# Patient Record
Sex: MALE | Race: BLACK OR AFRICAN AMERICAN | NOT HISPANIC OR LATINO | Employment: FULL TIME | URBAN - METROPOLITAN AREA
[De-identification: names, ages, dates, MRNs, and addresses within clinical notes are randomized per-mention and may not be internally consistent; named-entity substitution may affect disease eponyms.]

---

## 2017-01-13 ENCOUNTER — LAB VISIT (OUTPATIENT)
Dept: LAB | Facility: HOSPITAL | Age: 37
End: 2017-01-13
Attending: INTERNAL MEDICINE
Payer: COMMERCIAL

## 2017-01-13 DIAGNOSIS — E78.5 HYPERLIPIDEMIA: ICD-10-CM

## 2017-01-13 LAB
CHOLEST/HDLC SERPL: 8.5 {RATIO}
HDL/CHOLESTEROL RATIO: 11.7 %
HDLC SERPL-MCNC: 265 MG/DL
HDLC SERPL-MCNC: 31 MG/DL
LDLC SERPL CALC-MCNC: 162.8 MG/DL
NONHDLC SERPL-MCNC: 234 MG/DL
TRIGL SERPL-MCNC: 356 MG/DL

## 2017-01-13 PROCEDURE — 80061 LIPID PANEL: CPT

## 2017-01-13 PROCEDURE — 36415 COLL VENOUS BLD VENIPUNCTURE: CPT | Mod: PO

## 2017-01-16 ENCOUNTER — PATIENT MESSAGE (OUTPATIENT)
Dept: INTERNAL MEDICINE | Facility: CLINIC | Age: 37
End: 2017-01-16

## 2017-01-16 DIAGNOSIS — E78.5 HYPERLIPIDEMIA, UNSPECIFIED HYPERLIPIDEMIA TYPE: Primary | ICD-10-CM

## 2017-01-16 RX ORDER — ATORVASTATIN CALCIUM 20 MG/1
20 TABLET, FILM COATED ORAL DAILY
Qty: 90 TABLET | Refills: 4 | Status: SHIPPED | OUTPATIENT
Start: 2017-01-16 | End: 2017-07-28 | Stop reason: SDUPTHER

## 2017-01-16 NOTE — TELEPHONE ENCOUNTER
Pt. Was informed of his results via phone.  Pt. Verbalized understanding of his new medication and orders per Dr. Hampton.  Patient will start and follow up in 6 months.

## 2017-03-29 ENCOUNTER — OFFICE VISIT (OUTPATIENT)
Dept: INTERNAL MEDICINE | Facility: CLINIC | Age: 37
End: 2017-03-29
Payer: COMMERCIAL

## 2017-03-29 VITALS
WEIGHT: 315 LBS | HEIGHT: 72 IN | HEART RATE: 69 BPM | DIASTOLIC BLOOD PRESSURE: 90 MMHG | BODY MASS INDEX: 42.66 KG/M2 | SYSTOLIC BLOOD PRESSURE: 128 MMHG | TEMPERATURE: 98 F

## 2017-03-29 DIAGNOSIS — G47.9 SLEEP DISTURBANCE: ICD-10-CM

## 2017-03-29 DIAGNOSIS — M54.50 ACUTE LEFT-SIDED LOW BACK PAIN WITHOUT SCIATICA: Primary | ICD-10-CM

## 2017-03-29 PROCEDURE — 3074F SYST BP LT 130 MM HG: CPT | Mod: S$GLB,,, | Performed by: INTERNAL MEDICINE

## 2017-03-29 PROCEDURE — 1160F RVW MEDS BY RX/DR IN RCRD: CPT | Mod: S$GLB,,, | Performed by: INTERNAL MEDICINE

## 2017-03-29 PROCEDURE — 3080F DIAST BP >= 90 MM HG: CPT | Mod: S$GLB,,, | Performed by: INTERNAL MEDICINE

## 2017-03-29 PROCEDURE — 99214 OFFICE O/P EST MOD 30 MIN: CPT | Mod: S$GLB,,, | Performed by: INTERNAL MEDICINE

## 2017-03-29 PROCEDURE — 99999 PR PBB SHADOW E&M-EST. PATIENT-LVL III: CPT | Mod: PBBFAC,,, | Performed by: INTERNAL MEDICINE

## 2017-03-29 RX ORDER — MELOXICAM 7.5 MG/1
7.5 TABLET ORAL DAILY
Qty: 10 TABLET | Refills: 0 | Status: SHIPPED | OUTPATIENT
Start: 2017-03-29 | End: 2017-06-30

## 2017-03-29 RX ORDER — CYCLOBENZAPRINE HCL 5 MG
5 TABLET ORAL 3 TIMES DAILY PRN
Qty: 10 TABLET | Refills: 0 | Status: SHIPPED | OUTPATIENT
Start: 2017-03-29 | End: 2017-04-08

## 2017-03-29 NOTE — PROGRESS NOTES
Subjective:       Patient ID: Juan Pablo Saeed is a 36 y.o. male.    Chief Complaint: Flank Pain    HPI  patient is a 36-year-old male coming in today with complaints of lower back discomfort.  He describes for about a week now has had some discomfort on his right lower back.  He states he does not recall a specific injury but he was not moving a lot of doing a lot of physical work in the timeframe when it started.  He states is mainly on the right lower back but it is starting to her low but on the left lower back.  It is not radiated down into his legs.  He denies any bowel or bladder symptoms associated with it.  It does not radiate into his groin.    He complains today of some issues around sleepiness and fatigue.  He states that over the last few days he's been exceptionally tired but probably overall he's had issues with sleepiness 50 some time.  We discussed his prior history with this and he did have evidence of some sleep apnea hypopnea on a home sleep study done in 2015 and then he had a follow-up and lab study but it doesn't look like he had follow-up on that study.  During his [place] exam today he scored an 11.  He is obese with a BMI of 49.57.  Clearly is likely dealing with a hypopnea and apnea syndrome.  Patient relates awakening gasping for breath and having witnessed apneas as well.    Review of Systems    Objective:   BP (!) 128/90  Pulse 69  Temp 98.1 °F (36.7 °C) (Tympanic)   Ht 6' (1.829 m)  Wt (!) 165.8 kg (365 lb 8.4 oz)  BMI 49.57 kg/m2     Physical Exam   Constitutional: He appears well-developed and well-nourished.   HENT:   Head: Normocephalic and atraumatic.   Eyes: Pupils are equal, round, and reactive to light.   Neck: Neck supple. No thyromegaly present.   Cardiovascular: Normal rate, regular rhythm and normal heart sounds.  Exam reveals no gallop and no friction rub.    No murmur heard.  Pulmonary/Chest: Breath sounds normal. He has no wheezes. He has no rales.   Abdominal:  Soft. Bowel sounds are normal. He exhibits no distension. There is no tenderness.   Musculoskeletal:   No spinous process tenderness.  There is tenderness in the right lower back superior to the iliac crest.  Mild spasm is noted.  Mild tenderness in the left lower back in the same region.  Straight leg raises are normal.  Figure fours are normal.   Vitals reviewed.      No visits with results within 2 Week(s) from this visit.  Latest known visit with results is:    Lab Visit on 01/13/2017   Component Date Value    Cholesterol 01/13/2017 265*    Triglycerides 01/13/2017 356*    HDL 01/13/2017 31*    LDL Cholesterol 01/13/2017 162.8*    HDL/Chol Ratio 01/13/2017 11.7*    Total Cholesterol/HDL Ra* 01/13/2017 8.5*    Non-HDL Cholesterol 01/13/2017 234        Assessment:       1. Acute left-sided low back pain without sciatica    2. Sleep disturbance        Plan:   No problem-specific Assessment & Plan notes found for this encounter.    Juan Pablo was seen today for flank pain.    Diagnoses and all orders for this visit:    Acute left-sided low back pain without sciatica  Comments:  Flexeril 5mg at bedtime.  Meloxicam 7.5mg once daily for 10 days.  Orders:  -     cyclobenzaprine (FLEXERIL) 5 MG tablet; Take 1 tablet (5 mg total) by mouth 3 (three) times daily as needed for Muscle spasms.  -     meloxicam (MOBIC) 7.5 MG tablet; Take 1 tablet (7.5 mg total) by mouth once daily.    Sleep disturbance  -     Ambulatory consult to Sleep Disorders        Return in about 3 months (around 6/29/2017).

## 2017-03-29 NOTE — MR AVS SNAPSHOT
[facility]  [address] [address] [facility] [address]  Phone: [phone]  Fax: [phone]                  Juan Pablo Saeed   3/29/2017 10:00 AM   Office Visit    Description:  Male : 1980   Provider:  Jordin Hampton MD   [facility]           Reason for Visit     Flank Pain           Diagnoses this Visit        Comments    Acute left-sided low back pain without sciatica    -  Primary Flexeril 5mg at bedtime.  Meloxicam 7.5mg once daily for 10 days.    Sleep disturbance                To Do List           Future Appointments        Provider Department Dept Phone    2017 2:40 PM Elizabeth Lejeune, NP [facility] [phone]    2017 10:00 AM Jordin Hampton MD [facility] [phone]    2017 8:10 AM [place]ORY, [place] [facility] [place] [phone]      Goals (5 Years of Data)     None      Follow-Up and Disposition     Return in about 3 months (around 2017).       These Medications        Disp Refills Start End    cyclobenzaprine (FLEXERIL) 5 MG tablet 10 tablet 0 3/29/2017 2017    Take 1 tablet (5 mg total) by mouth 3 (three) times daily as needed for Muscle spasms. - Oral    Pharmacy: [facility] [address] [place] [address] Ph #: [phone]       meloxicam (MOBIC) 7.5 MG tablet 10 tablet 0 3/29/2017     Take 1 tablet (7.5 mg total) by mouth once daily. - Oral    Pharmacy: [facility] [address] [place] [address] [address] [facility] Ph #: [phone]         [facility] On Call     [facility] On Call Nurse Care Line -  Assistance  Registered nurses in the [facility] provide clinical advisement, health education, appointment booking, and other advisory services.  Call for this free service at [phone].             Medications           Message regarding Medications     Verify the changes and/or additions to your medication regime  listed below are the same as discussed with your clinician today.  If any of these changes or additions are incorrect, please notify your healthcare provider.        START taking these NEW medications        Refills    cyclobenzaprine (FLEXERIL) 5 MG tablet 0    Sig: Take 1 tablet (5 mg total) by mouth 3 (three) times daily as needed for Muscle spasms.    Class: Normal    Route: Oral    meloxicam (MOBIC) 7.5 MG tablet 0    Sig: Take 1 tablet (7.5 mg total) by mouth once daily.    Class: Normal    Route: Oral           Verify that the below list of medications is an accurate representation of the medications you are currently taking.  If none reported, the list may be blank. If incorrect, please contact your healthcare provider. Carry this list with you in case of emergency.           Current Medications     atorvastatin (LIPITOR) 20 MG tablet Take 1 tablet (20 mg total) by mouth once daily.    lisinopril (PRINIVIL,ZESTRIL) 5 MG tablet Take 1 tablet (5 mg total) by mouth once daily.    vitamin E 8.9 unit/g Crea Apply to affected area twice daily    cyclobenzaprine (FLEXERIL) 5 MG tablet Take 1 tablet (5 mg total) by mouth 3 (three) times daily as needed for Muscle spasms.    meloxicam (MOBIC) 7.5 MG tablet Take 1 tablet (7.5 mg total) by mouth once daily.           Clinical Reference Information           Your Vitals Were     BP Pulse Temp Height Weight BMI    128/90 69 98.1 °F (36.7 °C) (Tympanic) 6' (1.829 m) 165.8 kg (365 lb 8.4 oz) 49.57 kg/m2      Blood Pressure          Most Recent Value    BP  (!)  128/90      Allergies as of 3/29/2017     No Known Allergies      Immunizations Administered on Date of Encounter - 3/29/2017     None      Orders Placed During Today's Visit      Normal Orders This Visit    Ambulatory consult to Sleep Disorders       Language Assistance Services     ATTENTION: Language assistance services are available, free of charge. Please call [phone].      ATENCIÓN: Bipin palmer  tiene a macario disposición servicios gratuitos de asistencia lingüística. Hillarycorby al [phone].     DUANE Ý: N?u b?n nói Ti?ng Vi?t, có các d?ch v? h? tr? ngôn ng? mi?n phí dành cho b?n. G?i s? [phone].         [facility] complies with applicable Federal civil rights laws and does not discriminate on the basis of race, color, national origin, age, disability, or sex.

## 2017-04-25 ENCOUNTER — INITIAL CONSULT (OUTPATIENT)
Dept: SLEEP MEDICINE | Facility: CLINIC | Age: 37
End: 2017-04-25
Payer: COMMERCIAL

## 2017-04-25 ENCOUNTER — TELEPHONE (OUTPATIENT)
Dept: SLEEP MEDICINE | Facility: CLINIC | Age: 37
End: 2017-04-25

## 2017-04-25 VITALS
WEIGHT: 315 LBS | RESPIRATION RATE: 18 BRPM | BODY MASS INDEX: 42.66 KG/M2 | DIASTOLIC BLOOD PRESSURE: 64 MMHG | SYSTOLIC BLOOD PRESSURE: 130 MMHG | HEIGHT: 72 IN | OXYGEN SATURATION: 97 % | HEART RATE: 86 BPM

## 2017-04-25 DIAGNOSIS — G47.33 OSA (OBSTRUCTIVE SLEEP APNEA): Primary | ICD-10-CM

## 2017-04-25 DIAGNOSIS — E66.01 MORBID OBESITY WITH BMI OF 45.0-49.9, ADULT: ICD-10-CM

## 2017-04-25 PROCEDURE — 99244 OFF/OP CNSLTJ NEW/EST MOD 40: CPT | Mod: S$GLB,,, | Performed by: NURSE PRACTITIONER

## 2017-04-25 PROCEDURE — 99999 PR PBB SHADOW E&M-EST. PATIENT-LVL III: CPT | Mod: PBBFAC,,, | Performed by: NURSE PRACTITIONER

## 2017-04-25 NOTE — PROGRESS NOTES
Subjective:      Patient ID: Juan Pablo Saeed is a 36 y.o. male.    Chief Complaint: sleep eval    HPI Comments: Patient presents to the office today for evaluation of sleep apnea.  Patient with snoring and witnessed apneas. Patient not having problems falling asleep, but wakes up frequently throughout the night.  Patient does not wake up feeling refreshed in the morning.  Patient with daytime hypersomnolence.  [place] Sleepiness Scale score 13.  Patient has had symptoms for years. Comorbidities include      Essential hypertension. Obesity  He has a positive HST in 2015 but did not follow up.  Bedtime midnight. Wake time 4am    STOP - BANG Questionnaire:     1. Snoring : Do you snore loudly ?    Yes    2. Tired : Do you often feel tired, fatigued, or sleepy during daytime? Yes    3. Observed: Has anyone observed you stop breathing during your sleep?   Yes     4. Blood pressure : Do you have or are you being treated for high blood pressure?   Yes    5. BMI :BMI more than 35 kg/m2?   Yes    6. Age : Age over 50 yr old?   No    7. Neck circumference: Neck circumference greater than 40 cm?   Yes    8. Gender: Gender male?   Yes    High risk of MOSHE: Yes 5 - 8  Intermediate risk of MOSHE: Yes 3 - 4  Low risk of MOSHE: Yes 0 - 2      References:   STOP Questionnaire   A Tool to Screen Patients for Obstructive Sleep Apnea: JENAE Arciniega.C.P.C., TJ Gaspar.B.B.S., Filemon Freire M.D.,Laura Aguirre, Ph.D., Nat Poole M.B.B.S.,_ Ana Bonner.,_ Anival Elmore M.D., SURAJ Dover.R.C.P.C.; Anesthesiology 2008; 108:812-21 Copyright © 2008, the American Society of Anesthesiologists, Inc. [employer]          /64  Pulse 86  Resp 18  Ht 6' (1.829 m)  Wt (!) 165.4 kg (364 lb 10.3 oz)  SpO2 97%  BMI 49.45 kg/m2  Body mass index is 49.45 kg/(m^2).    Review of Systems   Constitutional: Negative for fever, chills and activity change.   HENT: Negative for  postnasal drip, rhinorrhea, trouble swallowing and congestion.    Respiratory: Negative for hemoptysis, shortness of breath and wheezing.    Cardiovascular: Negative for chest pain, palpitations and leg swelling.   Genitourinary: Negative for difficulty urinating and hematuria.   Endocrine: Negative for cold intolerance and heat intolerance.    Musculoskeletal: Negative for gait problem, joint swelling and myalgias.   Skin: Negative for rash.   Gastrointestinal: Negative for nausea, vomiting, abdominal pain and abdominal distention.   Neurological: Negative for dizziness.   Hematological: Negative for adenopathy.   Psychiatric/Behavioral: Positive for sleep disturbance. Negative for confusion. The patient is not nervous/anxious.      Objective:      Physical Exam   Constitutional: He is oriented to person, place, and time. He appears well-developed and well-nourished.   Obese   HENT:   Head: Normocephalic and atraumatic.   Mouth/Throat: Oropharynx is clear and moist.   Mallampati Score: III     Eyes: EOM are normal. Pupils are equal, round, and reactive to light.   Neck: Normal range of motion. Neck supple.   Neck circumference:20 inches      Cardiovascular: Normal rate and regular rhythm.  Exam reveals no gallop and no friction rub.    No murmur heard.  Pulmonary/Chest: Effort normal and breath sounds normal.   Abdominal: Soft. Bowel sounds are normal. He exhibits no distension. There is no tenderness.   Musculoskeletal: Normal range of motion.   Neurological: He is alert and oriented to person, place, and time.   Skin: Skin is warm and dry.   Psychiatric: He has a normal mood and affect.         Assessment:       1. MOSHE (obstructive sleep apnea)    2. Morbid obesity with BMI of 45.0-49.9, adult        Outpatient Encounter Prescriptions as of 4/25/2017   Medication Sig Dispense Refill    atorvastatin (LIPITOR) 20 MG tablet Take 1 tablet (20 mg total) by mouth once daily. 90 tablet 4    lisinopril  (PRINIVIL,ZESTRIL) 5 MG tablet Take 1 tablet (5 mg total) by mouth once daily. 30 tablet 11    meloxicam (MOBIC) 7.5 MG tablet Take 1 tablet (7.5 mg total) by mouth once daily. 10 tablet 0    vitamin E 8.9 unit/g Crea Apply to affected area twice daily 112 g 2     No facility-administered encounter medications on file as of 4/25/2017.      Orders Placed This Encounter   Procedures    Polysomnogram (CPAP will be added if patient meets diagnostic criteria.)     Standing Status:   Future     Standing Expiration Date:   4/25/2018     Plan:      Formal polysomnogram for evaluation of sleep apnea. Return to clinic when study is available for review.  Avoid drowsy driving.  Weight loss and exercise to improve overall health.      Thank you Dr. Hampton for this consultation.

## 2017-04-25 NOTE — PATIENT INSTRUCTIONS
Your provider has scheduled you for a sleep study.   You should be receiving a phone call from the sleep lab shortly after your study has been approved by your insurance. Your insurance will decide approval for home sleep test vs. inlab sleep study (formal polysomnogram).  Please make sure you have your current phone numbers in the [facility] system. If you do not hear from anyone in the next 10 business days, please call the sleep lab at [phone] to schedule your sleep study.     -The formal inlab sleep studies are performed at [facility]. If it is noted that you do have sleep apnea on your initial sleep study, you may receive a call back for a second night study with the CPAP before you come back to the office.     -The home sleep test are performed at your home through [facility]. If you have any questions please contact our sleep lab at [phone]. You can also go to [email or web address] for more information about your home sleep test.     The sleep lab will also make a follow up appointment with your provider to review the results of the sleep test. This follow up appointment will be 10-14 days after your sleep study to review the results.     Continuous Positive Air Pressure (CPAP)  Continuous positive air pressure (CPAP) uses gentle air pressure to hold the airway open. CPAP is often the most effective treatment for sleep apnea and severe snoring. It works very well for many people. But keep in mind that it can take several adjustments before the setup is right for you.    How CPAP works  The CPAPmachine  is a small portable pump beside the bed. The pump sends air through a hose, which is held over your nose and mouth by a mask. Mild air pressure is gently pushed through your airway. The air pressure nudges sagging tissues aside. This widens the airway so you can breathe better. CPAP may be combined with other kinds of therapy for sleep apnea.     A mask over the nose gently directs air into the  throat to keep the airway open.   Types of air pressure treatments  There are different types of CPAP. Your doctor or CPAP technician will help you decide which type is best for you:  · Basic CPAP keeps the pressure constant all night long.  · A bilevel device (BiPAP) provides more pressure when you breathe in and less when you breathe out. A BiPAP machine also may be set to provide automatic breaths to maintain breathing if you stop breathing while sleeping.  · An autoCPAP device automatically adjusts pressure throughout the night and in response to changes such as body position, sleep stage, and snoring.  Date Last Reviewed: 8/10/2015  © 6341-4133 [employer]. [address] [place] [address], [place] [address]. All rights reserved. This information is not intended as a substitute for professional medical care. Always follow your healthcare professional's instructions.

## 2017-04-25 NOTE — MR AVS SNAPSHOT
[facility]  [address] [place] [address]  Phone: [phone]                  Juan Pablo Saeed   2017 2:40 PM   Initial consult    Description:  Male : 1980   Provider:  Elizabeth Lejeune, NP   Department:  [facility]           Reason for Visit     sleep eval           Diagnoses this Visit        Comments    MOSHE (obstructive sleep apnea)    -  Primary     Morbid obesity with BMI of 45.0-49.9, adult                To Do List           Future Appointments        Provider Department Dept Phone    2017 10:20 AM Jordin Hampton MD [place] [phone]    2017 8:10 AM LAB[place]ATORY, [place] [facility] [place] [phone]      Goals (5 Years of Data)     None      [facility] On Call     [facility] Nurse Care Line -  Assistance  Unless otherwise directed by your provider, please contact [facility], our nurse care line that is available for  assistance.     Registered nurses in the [facility] provide: appointment scheduling, clinical advisement, health education, and other advisory services.  Call: [phone] (toll free)               Medications           Message regarding Medications     Verify the changes and/or additions to your medication regime listed below are the same as discussed with your clinician today.  If any of these changes or additions are incorrect, please notify your healthcare provider.             Verify that the below list of medications is an accurate representation of the medications you are currently taking.  If none reported, the list may be blank. If incorrect, please contact your healthcare provider. Carry this list with you in case of emergency.           Current Medications     atorvastatin (LIPITOR) 20 MG tablet Take 1 tablet (20 mg total) by mouth once daily.    lisinopril (PRINIVIL,ZESTRIL) 5 MG tablet Take 1 tablet (5 mg total) by mouth once daily.     meloxicam (MOBIC) 7.5 MG tablet Take 1 tablet (7.5 mg total) by mouth once daily.    vitamin E 8.9 unit/g Crea Apply to affected area twice daily           Clinical Reference Information           Your Vitals Were     BP Pulse Resp Height Weight SpO2    130/64 86 18 6' (1.829 m) 165.4 kg (364 lb 10.3 oz) 97%    BMI                49.45 kg/m2          Blood Pressure          Most Recent Value    BP  130/64      Allergies as of 4/25/2017     No Known Allergies      Immunizations Administered on Date of Encounter - 4/25/2017     None      Orders Placed During Today's Visit     Future Labs/Procedures Expected by Expires    Polysomnogram (CPAP will be added if patient meets diagnostic criteria.)  As directed 4/25/2018      Instructions    Your provider has scheduled you for a sleep study.   You should be receiving a phone call from the sleep lab shortly after your study has been approved by your insurance. Your insurance will decide approval for home sleep test vs. inlab sleep study (formal polysomnogram).  Please make sure you have your current phone numbers in the [facility] system. If you do not hear from anyone in the next 10 business days, please call the sleep lab at [phone] to schedule your sleep study.     -The formal inlab sleep studies are performed at [facility]. If it is noted that you do have sleep apnea on your initial sleep study, you may receive a call back for a second night study with the CPAP before you come back to the office.     -The home sleep test are performed at your home through [facility]. If you have any questions please contact our sleep lab at [phone]. You can also go to [email or web address] for more information about your home sleep test.     The sleep lab will also make a follow up appointment with your provider to review the results of the sleep test. This follow up appointment will be 10-14 days after your sleep study to review the results.     Continuous Positive Air  Pressure (CPAP)  Continuous positive air pressure (CPAP) uses gentle air pressure to hold the airway open. CPAP is often the most effective treatment for sleep apnea and severe snoring. It works very well for many people. But keep in mind that it can take several adjustments before the setup is right for you.    How CPAP works  The CPAPmachine  is a small portable pump beside the bed. The pump sends air through a hose, which is held over your nose and mouth by a mask. Mild air pressure is gently pushed through your airway. The air pressure nudges sagging tissues aside. This widens the airway so you can breathe better. CPAP may be combined with other kinds of therapy for sleep apnea.     A mask over the nose gently directs air into the throat to keep the airway open.   Types of air pressure treatments  There are different types of CPAP. Your doctor or CPAP technician will help you decide which type is best for you:  · Basic CPAP keeps the pressure constant all night long.  · A bilevel device (BiPAP) provides more pressure when you breathe in and less when you breathe out. A BiPAP machine also may be set to provide automatic breaths to maintain breathing if you stop breathing while sleeping.  · An autoCPAP device automatically adjusts pressure throughout the night and in response to changes such as body position, sleep stage, and snoring.  Date Last Reviewed: 8/10/2015  © 6607-0655 [employer]. [address] [place] [address]. All rights reserved. This information is not intended as a substitute for professional medical care. Always follow your healthcare professional's instructions.             Language Assistance Services     ATTENTION: Language assistance services are available, free of charge. Please call [phone].      ATENCIÓN: Si habla español, tiene a macario disposición servicios gratuitos de asistencia lingüística. Llame al [phone].     DUANE Ý: N?u b?n nói Ti?ng Vi?t, có các  d?ch v? h? tr? ngôn ng? mi?n phí yandyh cho b?n. G?i s? [phone].         [facility] complies with applicable Federal civil rights laws and does not discriminate on the basis of race, color, national origin, age, disability, or sex.

## 2017-04-25 NOTE — TELEPHONE ENCOUNTER
----- Message from Nemo Johnson sent at 4/25/2017 12:49 PM CDT -----  needs callback rg today's appt, will elaborate..[phone] (home)

## 2017-04-25 NOTE — LETTER
April 25, 2017      Jordin Hampton MD  [address]  [address]  [place] [address]           [facility]  [address]  [place] [address]  Phone: [phone]          Patient: Juan Pablo Saeed   MR Number: [id number]   YOB: 1980   Date of Visit: 4/25/2017       Dear Dr. Jordin Hampton:    Thank you for referring Juan Pablo Saeed to me for evaluation. Attached you will find relevant portions of my assessment and plan of care.    If you have questions, please do not hesitate to call me. I look forward to following Juan Pablo Saeed along with you.    Sincerely,    Elizabeth Lejeune, NP    Enclosure  CC:  No Recipients    If you would like to receive this communication electronically, please contact [email or web address] or [phone] to request more information on [employer] Link access.    For providers and/or their staff who would like to refer a patient to [facility], please contact us through our one-stop-shop provider referral line, [facility] , at [phone].    If you feel you have received this communication in error or would no longer like to receive these types of communications, please e-mail [email or web address]

## 2017-04-25 NOTE — TELEPHONE ENCOUNTER
Pt thought this was the appt to have the sleep study done. Informed him this is the evaluation to get the sleep study. Pt verbalized understanding

## 2017-05-02 ENCOUNTER — HOSPITAL ENCOUNTER (OUTPATIENT)
Dept: SLEEP MEDICINE | Facility: HOSPITAL | Age: 37
Discharge: HOME | End: 2017-05-02
Attending: NURSE PRACTITIONER
Payer: COMMERCIAL

## 2017-05-02 DIAGNOSIS — G47.33 OSA (OBSTRUCTIVE SLEEP APNEA): Primary | ICD-10-CM

## 2017-05-02 DIAGNOSIS — G47.63 SLEEP-RELATED BRUXISM: ICD-10-CM

## 2017-05-02 DIAGNOSIS — E66.01 MORBID OBESITY WITH BMI OF 45.0-49.9, ADULT: ICD-10-CM

## 2017-05-02 DIAGNOSIS — Z72.821 INADEQUATE SLEEP HYGIENE: ICD-10-CM

## 2017-05-02 DIAGNOSIS — G47.26 CIRCADIAN RHYTHM SLEEP DISORDER, SHIFT WORK TYPE: ICD-10-CM

## 2017-05-02 DIAGNOSIS — F51.04 PSYCHOPHYSIOLOGICAL INSOMNIA: ICD-10-CM

## 2017-05-02 DIAGNOSIS — F51.12 BEHAVIORALLY INDUCED INSUFFICIENT SLEEP SYNDROME: ICD-10-CM

## 2017-05-02 PROCEDURE — 95810 POLYSOM 6/> YRS 4/> PARAM: CPT

## 2017-05-02 PROCEDURE — 95810 POLYSOM 6/> YRS 4/> PARAM: CPT | Mod: 26,,, | Performed by: PSYCHOLOGIST

## 2017-05-08 ENCOUNTER — TELEPHONE (OUTPATIENT)
Dept: PULMONOLOGY | Facility: CLINIC | Age: 37
End: 2017-05-08

## 2017-05-08 DIAGNOSIS — G47.33 OSA (OBSTRUCTIVE SLEEP APNEA): Primary | ICD-10-CM

## 2017-05-12 ENCOUNTER — OFFICE VISIT (OUTPATIENT)
Dept: INTERNAL MEDICINE | Facility: CLINIC | Age: 37
End: 2017-05-12
Payer: COMMERCIAL

## 2017-05-12 VITALS
HEIGHT: 73 IN | HEART RATE: 72 BPM | BODY MASS INDEX: 41.75 KG/M2 | DIASTOLIC BLOOD PRESSURE: 80 MMHG | WEIGHT: 315 LBS | TEMPERATURE: 98 F | SYSTOLIC BLOOD PRESSURE: 118 MMHG

## 2017-05-12 DIAGNOSIS — M67.479 GANGLION CYST OF FOOT: ICD-10-CM

## 2017-05-12 DIAGNOSIS — I10 ESSENTIAL HYPERTENSION: ICD-10-CM

## 2017-05-12 DIAGNOSIS — M77.12 LEFT TENNIS ELBOW: Primary | ICD-10-CM

## 2017-05-12 DIAGNOSIS — G47.33 OSA (OBSTRUCTIVE SLEEP APNEA): ICD-10-CM

## 2017-05-12 PROCEDURE — 99213 OFFICE O/P EST LOW 20 MIN: CPT | Mod: S$GLB,,, | Performed by: PHYSICIAN ASSISTANT

## 2017-05-12 PROCEDURE — 3074F SYST BP LT 130 MM HG: CPT | Mod: S$GLB,,, | Performed by: PHYSICIAN ASSISTANT

## 2017-05-12 PROCEDURE — 3079F DIAST BP 80-89 MM HG: CPT | Mod: S$GLB,,, | Performed by: PHYSICIAN ASSISTANT

## 2017-05-12 PROCEDURE — 99999 PR PBB SHADOW E&M-EST. PATIENT-LVL III: CPT | Mod: PBBFAC,,, | Performed by: PHYSICIAN ASSISTANT

## 2017-05-12 PROCEDURE — 1160F RVW MEDS BY RX/DR IN RCRD: CPT | Mod: S$GLB,,, | Performed by: PHYSICIAN ASSISTANT

## 2017-05-12 NOTE — PATIENT INSTRUCTIONS
Understanding Lateral Epicondylitis    Tendons are strong bands of tissue that connect muscles to bones. Lateral epicondylitis affects the tendons that connect muscles in the forearm to the lateral epicondyle. This is the bony knob on the outer side of the elbow. The condition occurs if the extensor tendons of the wrist become red and swollen (irritated). This can cause pain in the elbow, forearm, and wrist. Because the condition is sometimes caused by playing tennis, it is also known as tennis elbow.  How to say it  LA-tuhr-juan r [id number]   What causes lateral epicondylitis?  The condition most often occurs because of overuse. This can be from any activity that repeatedly puts stress on the forearm extensor muscles or tendons and wrist. For instance, playing tennis, lifting weights, cutting meat, painting, and typing can all cause the condition. Wear and tear of the tendons from aging or an injury to the tendons can also cause the condition.  Symptoms of lateral epicondylitis  The most common symptom is pain. You may feel it on the outer side of the elbow and down the back of the forearm. It may be worse when moving or using the elbow, forearm, or wrist. You may also feel pain when gripping or lifting things.  Treatment for lateral epicondylitis  Treatments may include:  · Resting the elbow, forearm, and wrist. Youll need to avoid movements that can make your symptoms worse. You also may need to avoid certain sports and types of work for a time. This helps relieve symptoms and prevent further damage to the tendons.  · Changing the action that caused the problem. For instance, if the tendons were damaged from playing tennis, it may help to change your playing technique or use different equipment. This helps prevent further damage to the tendons.  · Using cold packs. Putting an ice pack on the injured area can help reduce pain and swelling.  · Taking pain medicines. Taking prescription or  over-the-counter pain medicines may help reduce pain and swelling.    · Wearing a brace. This helps reduce strain on the muscles and tendons in the forearm, which may relieve symptoms. It is very important to wear the brace properly.  · Doing exercises and physical therapy. These help improve strength and range of motion in the elbow, forearm, and wrist.  · Getting shots of medicine into the injured area. These may help relieve symptoms for a time.  · Having surgery. This may be an option if other treatments fail to relieve symptoms. In many cases, the surgeon removes the damaged tissue.  Possible complications of lateral epicondylitis  If the tendons involved dont heal properly, symptoms may return or get worse. To help prevent this, follow your treatment plan as directed.  When to call your healthcare provider  Call your healthcare provider right away if you have any of these:  · Fever of 100.4°F (38°C) or higher, or as directed  · Redness, swelling, or warmth in the elbow or forearm that gets worse  · Symptoms that dont get better with treatment, or get worse  · New symptoms   Date Last Reviewed: 3/10/2016  © 1975-4165 [employer]. [address] [place] [address]. All rights reserved. This information is not intended as a substitute for professional medical care. Always follow your healthcare professional's instructions.        Understanding Lateral Epicondylitis    Tendons are strong bands of tissue that connect muscles to bones. Lateral epicondylitis affects the tendons that connect muscles in the forearm to the lateral epicondyle. This is the bony knob on the outer side of the elbow. The condition occurs if the extensor tendons of the wrist become red and swollen (irritated). This can cause pain in the elbow, forearm, and wrist. Because the condition is sometimes caused by playing tennis, it is also known as tennis elbow.  How to say it  LA-tuhr-juan r [id number]   What  causes lateral epicondylitis?  The condition most often occurs because of overuse. This can be from any activity that repeatedly puts stress on the forearm extensor muscles or tendons and wrist. For instance, playing tennis, lifting weights, cutting meat, painting, and typing can all cause the condition. Wear and tear of the tendons from aging or an injury to the tendons can also cause the condition.  Symptoms of lateral epicondylitis  The most common symptom is pain. You may feel it on the outer side of the elbow and down the back of the forearm. It may be worse when moving or using the elbow, forearm, or wrist. You may also feel pain when gripping or lifting things.  Treatment for lateral epicondylitis  Treatments may include:  · Resting the elbow, forearm, and wrist. Youll need to avoid movements that can make your symptoms worse. You also may need to avoid certain sports and types of work for a time. This helps relieve symptoms and prevent further damage to the tendons.  · Changing the action that caused the problem. For instance, if the tendons were damaged from playing tennis, it may help to change your playing technique or use different equipment. This helps prevent further damage to the tendons.  · Using cold packs. Putting an ice pack on the injured area can help reduce pain and swelling.  · Taking pain medicines. Taking prescription or over-the-counter pain medicines may help reduce pain and swelling.    · Wearing a brace. This helps reduce strain on the muscles and tendons in the forearm, which may relieve symptoms. It is very important to wear the brace properly.  · Doing exercises and physical therapy. These help improve strength and range of motion in the elbow, forearm, and wrist.  · Getting shots of medicine into the injured area. These may help relieve symptoms for a time.  · Having surgery. This may be an option if other treatments fail to relieve symptoms. In many cases, the surgeon removes the  damaged tissue.  Possible complications of lateral epicondylitis  If the tendons involved dont heal properly, symptoms may return or get worse. To help prevent this, follow your treatment plan as directed.  When to call your healthcare provider  Call your healthcare provider right away if you have any of these:  · Fever of 100.4°F (38°C) or higher, or as directed  · Redness, swelling, or warmth in the elbow or forearm that gets worse  · Symptoms that dont get better with treatment, or get worse  · New symptoms   Date Last Reviewed: 3/10/2016  © 3706-2701 [employer]. [address] [place] [address]. All rights reserved. This information is not intended as a substitute for professional medical care. Always follow your healthcare professional's instructions.        Understanding Lateral Epicondylitis    Tendons are strong bands of tissue that connect muscles to bones. Lateral epicondylitis affects the tendons that connect muscles in the forearm to the lateral epicondyle. This is the bony knob on the outer side of the elbow. The condition occurs if the extensor tendons of the wrist become red and swollen (irritated). This can cause pain in the elbow, forearm, and wrist. Because the condition is sometimes caused by playing tennis, it is also known as tennis elbow.  How to say it  LA-tuhr-juan r [id number]   What causes lateral epicondylitis?  The condition most often occurs because of overuse. This can be from any activity that repeatedly puts stress on the forearm extensor muscles or tendons and wrist. For instance, playing tennis, lifting weights, cutting meat, painting, and typing can all cause the condition. Wear and tear of the tendons from aging or an injury to the tendons can also cause the condition.  Symptoms of lateral epicondylitis  The most common symptom is pain. You may feel it on the outer side of the elbow and down the back of the forearm. It may be worse when moving or  using the elbow, forearm, or wrist. You may also feel pain when gripping or lifting things.  Treatment for lateral epicondylitis  Treatments may include:  · Resting the elbow, forearm, and wrist. Youll need to avoid movements that can make your symptoms worse. You also may need to avoid certain sports and types of work for a time. This helps relieve symptoms and prevent further damage to the tendons.  · Changing the action that caused the problem. For instance, if the tendons were damaged from playing tennis, it may help to change your playing technique or use different equipment. This helps prevent further damage to the tendons.  · Using cold packs. Putting an ice pack on the injured area can help reduce pain and swelling.  · Taking pain medicines. Taking prescription or over-the-counter pain medicines may help reduce pain and swelling.    · Wearing a brace. This helps reduce strain on the muscles and tendons in the forearm, which may relieve symptoms. It is very important to wear the brace properly.  · Doing exercises and physical therapy. These help improve strength and range of motion in the elbow, forearm, and wrist.  · Getting shots of medicine into the injured area. These may help relieve symptoms for a time.  · Having surgery. This may be an option if other treatments fail to relieve symptoms. In many cases, the surgeon removes the damaged tissue.  Possible complications of lateral epicondylitis  If the tendons involved dont heal properly, symptoms may return or get worse. To help prevent this, follow your treatment plan as directed.  When to call your healthcare provider  Call your healthcare provider right away if you have any of these:  · Fever of 100.4°F (38°C) or higher, or as directed  · Redness, swelling, or warmth in the elbow or forearm that gets worse  · Symptoms that dont get better with treatment, or get worse  · New symptoms   Date Last Reviewed: 3/10/2016  © 0759-4285 [employer] [address] [place] [id number]. All rights reserved. This information is not intended as a substitute for professional medical care. Always follow your healthcare professional's instructions.

## 2017-05-12 NOTE — PROGRESS NOTES
"Subjective:       Patient ID: Juan Pablo Saeed is a 36 y.o. male.    Chief Complaint: Elbow Pain and bump on left foot    HPI  Patient comes in today for left elbow pain - pain present when he lifts up on things.  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  This has been present for a few weeks. No swelling or injury   Pain absent when resting.   He also has bump on left foot that popped up randomly, no injury. It is not painful or bothersome  No swelling       Past Medical History:   Diagnosis Date    Back pain     GERD (gastroesophageal reflux disease)     HTN (hypertension)     Obesity        Current Outpatient Prescriptions   Medication Sig Dispense Refill    atorvastatin (LIPITOR) 20 MG tablet Take 1 tablet (20 mg total) by mouth once daily. 90 tablet 4    lisinopril (PRINIVIL,ZESTRIL) 5 MG tablet Take 1 tablet (5 mg total) by mouth once daily. 30 tablet 11    meloxicam (MOBIC) 7.5 MG tablet Take 1 tablet (7.5 mg total) by mouth once daily. 10 tablet 0    vitamin E 8.9 unit/g Crea Apply to affected area twice daily 112 g 2     No current facility-administered medications for this visit.        Review of Systems    Objective:   /80  Pulse 72  Temp 97.8 °F (36.6 °C) (Tympanic)   Ht 6' 0.75" (1.848 m)  Wt (!) 166 kg (365 lb 15.4 oz)  BMI 48.62 kg/m2     Physical Exam   Constitutional: He is oriented to person, place, and time. He appears well-developed and well-nourished. No distress.   HENT:   Head: Normocephalic and atraumatic.   Eyes: Conjunctivae and EOM are normal. Pupils are equal, round, and reactive to light.   Cardiovascular: Normal rate, regular rhythm and normal heart sounds.    Musculoskeletal:        Left elbow: Tenderness found. Lateral epicondyle tenderness noted.        Arms:  Neurological: He is alert and oriented to person, place, and time.       Soft, round 1cm superficial ganglion left midfoot   Lab Results   Component Value Date    WBC 6.23 06/29/2016 "    HGB 13.5 (L) 06/29/2016    HCT 40.8 06/29/2016     06/29/2016    CHOL 265 (H) 01/13/2017    TRIG 356 (H) 01/13/2017    HDL 31 (L) 01/13/2017    ALT 29 06/29/2016    AST 27 06/29/2016     08/22/2016    K 4.5 08/22/2016     08/22/2016    CREATININE 1.8 (H) 08/22/2016    BUN 17 08/22/2016    CO2 24 08/22/2016    TSH 1.940 02/17/2015    HGBA1C 5.7 08/22/2016       Assessment:       1. Left tennis elbow    2. Essential hypertension    3. MOSHE (obstructive sleep apnea)    4. Ganglion cyst of foot        Plan:   Left tennis elbow- only present with activity.  Discussed wearing a brace, activity modification and then if needed, start NSAID    Essential hypertension-  Complicated by MOSHE, obesity and sleeping habits     MOSHE (obstructive sleep apnea)-  Has follow up with pulmonary soon, discussed need for weight loss.     Ganglion cyst of foot- observation

## 2017-05-12 NOTE — MR AVS SNAPSHOT
[facility]  [address] [place] [address]  Phone: [phone]  Fax: [phone]                  Juan Pablo Saeed   2017 1:00 PM   Office Visit    Description:  Male : 1980   Provider:  SHELLI Clancy   [facility]           Reason for Visit     Elbow Pain     bump on left foot           Diagnoses this Visit        Comments    Left tennis elbow    -  Primary            To Do List           Future Appointments        Provider Department Dept Phone    2017 1:20 PM Tal Mcclure MD [facility] Nephrology [phone]    2017 9:00 AM Elizabeth Lejeune, NP [facility] Sleep Clinic [phone]    2017 10:20 AM Jordin Hampton [facility] [phone]    2017 8:10 AM LAB[facility]ATORY, [place] [facility] [phone]      Goals (5 Years of Data)     None      [facility]     [facility] Nurse Care Line -  Assistance  Unless otherwise directed by your provider, please contact [facility], our nurse care line that is available for  assistance.     Registered nurses in the [facility] provide: appointment scheduling, clinical advisement, health education, and other advisory services.  Call: [phone] (toll free)               Medications           Message regarding Medications     Verify the changes and/or additions to your medication regime listed below are the same as discussed with your clinician today.  If any of these changes or additions are incorrect, please notify your healthcare provider.             Verify that the below list of medications is an accurate representation of the medications you are currently taking.  If none reported, the list may be blank. If incorrect, please contact your healthcare provider. Carry this list with you in case of emergency.           Current Medications     atorvastatin (LIPITOR) 20 MG tablet Take 1  "tablet (20 mg total) by mouth once daily.    lisinopril (PRINIVIL,ZESTRIL) 5 MG tablet Take 1 tablet (5 mg total) by mouth once daily.    meloxicam (MOBIC) 7.5 MG tablet Take 1 tablet (7.5 mg total) by mouth once daily.    vitamin E 8.9 unit/g Crea Apply to affected area twice daily           Clinical Reference Information           Your Vitals Were     BP Pulse Temp Height Weight BMI    118/80 72 97.8 °F (36.6 °C) (Tympanic) 6' 0.75" (1.848 m) 166 kg (365 lb 15.4 oz) 48.62 kg/m2      Blood Pressure          Most Recent Value    BP  118/80      Allergies as of 5/12/2017     No Known Allergies      Immunizations Administered on Date of Encounter - 5/12/2017     None      Instructions      Understanding Lateral Epicondylitis    Tendons are strong bands of tissue that connect muscles to bones. Lateral epicondylitis affects the tendons that connect muscles in the forearm to the lateral epicondyle. This is the bony knob on the outer side of the elbow. The condition occurs if the extensor tendons of the wrist become red and swollen (irritated). This can cause pain in the elbow, forearm, and wrist. Because the condition is sometimes caused by playing tennis, it is also known as tennis elbow.  How to say it  LA-tuhr-juan r [id number]   What causes lateral epicondylitis?  The condition most often occurs because of overuse. This can be from any activity that repeatedly puts stress on the forearm extensor muscles or tendons and wrist. For instance, playing tennis, lifting weights, cutting meat, painting, and typing can all cause the condition. Wear and tear of the tendons from aging or an injury to the tendons can also cause the condition.  Symptoms of lateral epicondylitis  The most common symptom is pain. You may feel it on the outer side of the elbow and down the back of the forearm. It may be worse when moving or using the elbow, forearm, or wrist. You may also feel pain when gripping or lifting things.  Treatment " for lateral epicondylitis  Treatments may include:  · Resting the elbow, forearm, and wrist. Youll need to avoid movements that can make your symptoms worse. You also may need to avoid certain sports and types of work for a time. This helps relieve symptoms and prevent further damage to the tendons.  · Changing the action that caused the problem. For instance, if the tendons were damaged from playing tennis, it may help to change your playing technique or use different equipment. This helps prevent further damage to the tendons.  · Using cold packs. Putting an ice pack on the injured area can help reduce pain and swelling.  · Taking pain medicines. Taking prescription or over-the-counter pain medicines may help reduce pain and swelling.    · Wearing a brace. This helps reduce strain on the muscles and tendons in the forearm, which may relieve symptoms. It is very important to wear the brace properly.  · Doing exercises and physical therapy. These help improve strength and range of motion in the elbow, forearm, and wrist.  · Getting shots of medicine into the injured area. These may help relieve symptoms for a time.  · Having surgery. This may be an option if other treatments fail to relieve symptoms. In many cases, the surgeon removes the damaged tissue.  Possible complications of lateral epicondylitis  If the tendons involved dont heal properly, symptoms may return or get worse. To help prevent this, follow your treatment plan as directed.  When to call your healthcare provider  Call your healthcare provider right away if you have any of these:  · Fever of 100.4°F (38°C) or higher, or as directed  · Redness, swelling, or warmth in the elbow or forearm that gets worse  · Symptoms that dont get better with treatment, or get worse  · New symptoms   Date Last Reviewed: 3/10/2016  © 2275-8462 [employer]. [address] [place] [address], [place] [address]. All rights reserved. This information is not intended as  a substitute for professional medical care. Always follow your healthcare professional's instructions.        Understanding Lateral Epicondylitis    Tendons are strong bands of tissue that connect muscles to bones. Lateral epicondylitis affects the tendons that connect muscles in the forearm to the lateral epicondyle. This is the bony knob on the outer side of the elbow. The condition occurs if the extensor tendons of the wrist become red and swollen (irritated). This can cause pain in the elbow, forearm, and wrist. Because the condition is sometimes caused by playing tennis, it is also known as tennis elbow.  How to say it  LA-tuhr-juan r [id number]   What causes lateral epicondylitis?  The condition most often occurs because of overuse. This can be from any activity that repeatedly puts stress on the forearm extensor muscles or tendons and wrist. For instance, playing tennis, lifting weights, cutting meat, painting, and typing can all cause the condition. Wear and tear of the tendons from aging or an injury to the tendons can also cause the condition.  Symptoms of lateral epicondylitis  The most common symptom is pain. You may feel it on the outer side of the elbow and down the back of the forearm. It may be worse when moving or using the elbow, forearm, or wrist. You may also feel pain when gripping or lifting things.  Treatment for lateral epicondylitis  Treatments may include:  · Resting the elbow, forearm, and wrist. Youll need to avoid movements that can make your symptoms worse. You also may need to avoid certain sports and types of work for a time. This helps relieve symptoms and prevent further damage to the tendons.  · Changing the action that caused the problem. For instance, if the tendons were damaged from playing tennis, it may help to change your playing technique or use different equipment. This helps prevent further damage to the tendons.  · Using cold packs. Putting an ice pack on the  injured area can help reduce pain and swelling.  · Taking pain medicines. Taking prescription or over-the-counter pain medicines may help reduce pain and swelling.    · Wearing a brace. This helps reduce strain on the muscles and tendons in the forearm, which may relieve symptoms. It is very important to wear the brace properly.  · Doing exercises and physical therapy. These help improve strength and range of motion in the elbow, forearm, and wrist.  · Getting shots of medicine into the injured area. These may help relieve symptoms for a time.  · Having surgery. This may be an option if other treatments fail to relieve symptoms. In many cases, the surgeon removes the damaged tissue.  Possible complications of lateral epicondylitis  If the tendons involved dont heal properly, symptoms may return or get worse. To help prevent this, follow your treatment plan as directed.  When to call your healthcare provider  Call your healthcare provider right away if you have any of these:  · Fever of 100.4°F (38°C) or higher, or as directed  · Redness, swelling, or warmth in the elbow or forearm that gets worse  · Symptoms that dont get better with treatment, or get worse  · New symptoms   Date Last Reviewed: 3/10/2016  © 6093-7489 [employer]. [address] [place] [address]. All rights reserved. This information is not intended as a substitute for professional medical care. Always follow your healthcare professional's instructions.        Understanding Lateral Epicondylitis    Tendons are strong bands of tissue that connect muscles to bones. Lateral epicondylitis affects the tendons that connect muscles in the forearm to the lateral epicondyle. This is the bony knob on the outer side of the elbow. The condition occurs if the extensor tendons of the wrist become red and swollen (irritated). This can cause pain in the elbow, forearm, and wrist. Because the condition is sometimes caused by playing tennis,  it is also known as tennis elbow.  How to say it  LA-tuhr-juan r [id number]   What causes lateral epicondylitis?  The condition most often occurs because of overuse. This can be from any activity that repeatedly puts stress on the forearm extensor muscles or tendons and wrist. For instance, playing tennis, lifting weights, cutting meat, painting, and typing can all cause the condition. Wear and tear of the tendons from aging or an injury to the tendons can also cause the condition.  Symptoms of lateral epicondylitis  The most common symptom is pain. You may feel it on the outer side of the elbow and down the back of the forearm. It may be worse when moving or using the elbow, forearm, or wrist. You may also feel pain when gripping or lifting things.  Treatment for lateral epicondylitis  Treatments may include:  · Resting the elbow, forearm, and wrist. Youll need to avoid movements that can make your symptoms worse. You also may need to avoid certain sports and types of work for a time. This helps relieve symptoms and prevent further damage to the tendons.  · Changing the action that caused the problem. For instance, if the tendons were damaged from playing tennis, it may help to change your playing technique or use different equipment. This helps prevent further damage to the tendons.  · Using cold packs. Putting an ice pack on the injured area can help reduce pain and swelling.  · Taking pain medicines. Taking prescription or over-the-counter pain medicines may help reduce pain and swelling.    · Wearing a brace. This helps reduce strain on the muscles and tendons in the forearm, which may relieve symptoms. It is very important to wear the brace properly.  · Doing exercises and physical therapy. These help improve strength and range of motion in the elbow, forearm, and wrist.  · Getting shots of medicine into the injured area. These may help relieve symptoms for a time.  · Having surgery. This may be an  option if other treatments fail to relieve symptoms. In many cases, the surgeon removes the damaged tissue.  Possible complications of lateral epicondylitis  If the tendons involved dont heal properly, symptoms may return or get worse. To help prevent this, follow your treatment plan as directed.  When to call your healthcare provider  Call your healthcare provider right away if you have any of these:  · Fever of 100.4°F (38°C) or higher, or as directed  · Redness, swelling, or warmth in the elbow or forearm that gets worse  · Symptoms that dont get better with treatment, or get worse  · New symptoms   Date Last Reviewed: 3/10/2016  © 0109-6960 [employer]. [address] [place] [address], [place] [address]. All rights reserved. This information is not intended as a substitute for professional medical care. Always follow your healthcare professional's instructions.             Language Assistance Services     ATTENTION: Language assistance services are available, free of charge. Please call [phone].      ATENCIÓN: Si habla estela, tiene a macario disposición servicios gratuitos de asistencia lingüística. Llame al [phone].     [facility] Ý: N?u b?n nói Ti?ng Vi?t, có các d?ch v? h? tr? ngôn ng? mi?n phí dành cho b?n. G?i s? [phone].         [facility] complies with applicable Federal civil rights laws and does not discriminate on the basis of race, color, national origin, age, disability, or sex.

## 2017-05-16 ENCOUNTER — TELEPHONE (OUTPATIENT)
Dept: PULMONOLOGY | Facility: CLINIC | Age: 37
End: 2017-05-16

## 2017-05-16 ENCOUNTER — OFFICE VISIT (OUTPATIENT)
Dept: NEPHROLOGY | Facility: CLINIC | Age: 37
End: 2017-05-16
Payer: COMMERCIAL

## 2017-05-16 ENCOUNTER — LAB VISIT (OUTPATIENT)
Dept: LAB | Facility: HOSPITAL | Age: 37
End: 2017-05-16
Attending: INTERNAL MEDICINE
Payer: COMMERCIAL

## 2017-05-16 VITALS
DIASTOLIC BLOOD PRESSURE: 78 MMHG | SYSTOLIC BLOOD PRESSURE: 118 MMHG | WEIGHT: 315 LBS | BODY MASS INDEX: 42.66 KG/M2 | HEIGHT: 72 IN | HEART RATE: 72 BPM

## 2017-05-16 DIAGNOSIS — R80.9 PROTEINURIA, UNSPECIFIED TYPE: ICD-10-CM

## 2017-05-16 DIAGNOSIS — N18.30 CKD (CHRONIC KIDNEY DISEASE) STAGE 3, GFR 30-59 ML/MIN: Primary | ICD-10-CM

## 2017-05-16 LAB
CREAT UR-MCNC: 119 MG/DL
PROT UR-MCNC: 21 MG/DL
PROT/CREAT RATIO, UR: 0.18

## 2017-05-16 PROCEDURE — 3074F SYST BP LT 130 MM HG: CPT | Mod: S$GLB,,, | Performed by: INTERNAL MEDICINE

## 2017-05-16 PROCEDURE — 3078F DIAST BP <80 MM HG: CPT | Mod: S$GLB,,, | Performed by: INTERNAL MEDICINE

## 2017-05-16 PROCEDURE — 1160F RVW MEDS BY RX/DR IN RCRD: CPT | Mod: S$GLB,,, | Performed by: INTERNAL MEDICINE

## 2017-05-16 PROCEDURE — 99214 OFFICE O/P EST MOD 30 MIN: CPT | Mod: S$GLB,,, | Performed by: INTERNAL MEDICINE

## 2017-05-16 PROCEDURE — 82570 ASSAY OF URINE CREATININE: CPT

## 2017-05-16 PROCEDURE — 99999 PR PBB SHADOW E&M-EST. PATIENT-LVL III: CPT | Mod: PBBFAC,,, | Performed by: INTERNAL MEDICINE

## 2017-05-16 NOTE — PATIENT INSTRUCTIONS
Patient was advised to avoid NSAID pain medications such as advil, aleve, motrin, ibuprofen, naprosyn, meloxicam, diclofenac, mobic.     Patient was advised to hydrate with 60-70 ounces of water per day.

## 2017-05-16 NOTE — PROGRESS NOTES
PROGRESS NOTE FOR ESTABLISHED PATIENT    PHYSICIAN REQUESTING THE CONSULT: Dr. Jordin Hampton    REASON FOR VISIT: Renal insufficiency/proteinuria    36 y.o. male with history of GERD, HTN, PUD presents to the renal clinic for evaluation of renal insufficiency and proteinuria.    Patient denies any complaints today. He denies using any NSAID use and reports good fluid intake (about 2 liters of H2O per day).         Past Medical History:   Diagnosis Date    Back pain     GERD (gastroesophageal reflux disease)     HTN (hypertension)     Obesity        Past Surgical History:   Procedure Laterality Date    CIRCUMCISION  2010       Review of patient's allergies indicates:  No Known Allergies    Current Outpatient Prescriptions   Medication Sig Dispense Refill    atorvastatin (LIPITOR) 20 MG tablet Take 1 tablet (20 mg total) by mouth once daily. 90 tablet 4    lisinopril (PRINIVIL,ZESTRIL) 5 MG tablet Take 1 tablet (5 mg total) by mouth once daily. 30 tablet 11    meloxicam (MOBIC) 7.5 MG tablet Take 1 tablet (7.5 mg total) by mouth once daily. 10 tablet 0    vitamin E 8.9 unit/g Crea Apply to affected area twice daily 112 g 2     No current facility-administered medications for this visit.        Family History   Problem Relation Age of Onset    Arthritis Mother     Cancer Mother      stomach    Diabetes Brother     Alcohol abuse Brother     Stroke Maternal Grandmother        Social History     Social History    Marital status:      Spouse name: N/A    Number of children: 0    Years of education: N/A     Occupational History    [employer]      Social History Main Topics    Smoking status: Never Smoker    Smokeless tobacco: Never Used    Alcohol use Yes    Drug use: No    Sexual activity: Yes     Other Topics Concern    Not on file     Social History Narrative       Review of Systems:  1. GENERAL: patient denies any fever, weight changes, generalized weakness, dizziness.  2. HEENT: patient  denies headaches, visual disturbances, swallowing problems, sinus pain, nasal congestion.  3. CARDIOVASCULAR: patient denies chest pain, palpitations.  4. PULMONARY: patient denies SOB, coughing, hemoptysis, wheezing.  5. GASTROINTESTINAL: patient denies abdominal pain, nausea, vomiting, diarrhea.  6. GENITOURINARY: patient denies dysuria, hematuria, hesitancy, frequency.  7. EXTREMITIES: patient denies LE edema, LE cramping.  8. DERMATOLOGY: patient denies rashes, ulcers.  9. NEURO: patient denies tremors, extremity weakness, extremity numbness/tingling.  10. MUSCULOSKELETAL: patient denies joint pain, joint swelling.  11. HEMATOLOGY: patient denies rectal or gum bleeding.  12: PSYCH: patient denies anxiety, depression.      PHYSICAL EXAM:  /78  Pulse 72  Ht 6' (1.829 m)  Wt (!) 165.7 kg (365 lb 4.5 oz)  BMI 49.54 kg/m2    GENERAL: Pleasant overweight gentleman presents to clinic with non-labored breathing.  HEENT: PER, no nasal discharge, no icterus, no oral exudates, moist mucosal membranes.  NECK: no thyroid mass, no lymphadenopathy.  HEART: RRR S1/S2, no rubs, good peripheral pulses.  LUNGS: CTA bilaterally, no wheezing, breathing is nonlabored.  ABDOMEN: soft, nontender, not distended, bowel sounds are present, no abdominal hernia.  EXTREM: no LE edema.  SKIN: no rashes, skin is warm and dry.  NEURO: A & O x 3, no obvious focal signs.    LABORATORY RESULTS:    Lab Results   Component Value Date    CREATININE 1.8 (H) 08/22/2016    BUN 17 08/22/2016     08/22/2016    K 4.5 08/22/2016     08/22/2016    CO2 24 08/22/2016      Lab Results   Component Value Date    CALCIUM 10.1 08/22/2016    PHOS 3.5 08/22/2016     Lab Results   Component Value Date    ALBUMIN 4.0 08/22/2016     Lab Results   Component Value Date    WBC 6.23 06/29/2016    HGB 13.5 (L) 06/29/2016    HCT 40.8 06/29/2016    MCV 80 (L) 06/29/2016     06/29/2016     Protein Creatinine Ratios:    Creatinine, Random Ur   Date  Value Ref Range Status   08/22/2016 170.0 23.0 - 375.0 mg/dL Final     Comment:     The random urine reference ranges provided were established   for 24 hour urine collections.  No reference ranges exist for  random urine specimens.  Correlate clinically.     02/17/2015 99.0 23.0 - 375.0 mg/dL Final     Comment:     The random urine reference ranges provided were established   for 24 hour urine collections.  No reference ranges exist for  random urine specimens.  Correlate clinically.       Protein, Urine   Date Value Ref Range Status   03/19/2015 111 (H) 0 - 15 mg/dL Final   02/26/2015 113 (H) 0 - 15 mg/dL Final     Protein, Urine Random   Date Value Ref Range Status   08/22/2016 58 (H) 0 - 15 mg/dL Final     Comment:     The random urine reference ranges provided were established   for 24 hour urine collections.  No reference ranges exist for  random urine specimens.  Correlate clinically.       Prot/Creat Ratio, Ur   Date Value Ref Range Status   08/22/2016 0.34 (H) 0.00 - 0.20 Final       24 hour urinary protein: 1.8 g (3/19/15).       ASSESSMENT AND PLAN:  36 y.o. male with history of GERD, HTN, PUD presents to the renal clinic for evaluation of renal insufficiency and proteinuria.    1. Renal insufficiency/proteinuria: Patient presents with mild renal insufficiency, consistent with CKD stage 3. Last creatinine was 1.8. Last protein creatinine ratio from 8/22/16 was 0.34. Serologies and HgA1c negative/within normal limits. He will return to the clinic in 4-5 months for follow up. Will follow up on today's labs. Patient was advised to avoid NSAID pain medications such as advil, aleve, motrin, ibuprofen, naprosyn, meloxicam, diclofenac, mobic.     2. Electrolytes: At goal. Will follow up on today's labs.     3. Acid base status: No acute issues. Will follow up on today's labs.     4. Volume: Euvolemic.    5. Hypertension: Good BP control.     6. Medications: Reviewed. Agree with current medical regimen. Patient  was advised to avoid NSAID pain medications such as advil, aleve, motrin, ibuprofen, naprosyn, meloxicam, diclofenac, mobic.

## 2017-05-16 NOTE — TELEPHONE ENCOUNTER
Informed pt that titration study has been ordered and after study is complete we will schedule him to follow up to review both studies. Pt verbalized understanding. Informed pt someone from the sleep lab will  Call to set up the titration study.

## 2017-05-19 ENCOUNTER — TELEPHONE (OUTPATIENT)
Dept: NEPHROLOGY | Facility: CLINIC | Age: 37
End: 2017-05-19

## 2017-05-19 NOTE — TELEPHONE ENCOUNTER
Returned call to patient who is requesting his creatinine from recent labs. Informed him that it is 1.6 and that put his function at about 60%. He expressed understanding.

## 2017-05-19 NOTE — TELEPHONE ENCOUNTER
----- Message from Frances Robb sent at 5/19/2017  2:50 PM CDT -----  Contact: fallon   Lab results   Call back

## 2017-05-25 ENCOUNTER — HOSPITAL ENCOUNTER (OUTPATIENT)
Dept: SLEEP MEDICINE | Facility: HOSPITAL | Age: 37
Discharge: HOME | End: 2017-05-25
Attending: INTERNAL MEDICINE
Payer: COMMERCIAL

## 2017-05-25 DIAGNOSIS — G47.33 OSA (OBSTRUCTIVE SLEEP APNEA): Primary | ICD-10-CM

## 2017-05-25 DIAGNOSIS — G47.26 CIRCADIAN RHYTHM SLEEP DISORDER, SHIFT WORK TYPE: ICD-10-CM

## 2017-05-25 DIAGNOSIS — F51.04 PSYCHOPHYSIOLOGICAL INSOMNIA: ICD-10-CM

## 2017-05-25 DIAGNOSIS — Z72.821 INADEQUATE SLEEP HYGIENE: ICD-10-CM

## 2017-05-25 DIAGNOSIS — G47.63 SLEEP-RELATED BRUXISM: ICD-10-CM

## 2017-05-25 DIAGNOSIS — F51.12 BEHAVIORALLY INDUCED INSUFFICIENT SLEEP SYNDROME: ICD-10-CM

## 2017-05-25 PROCEDURE — 95811 POLYSOM 6/>YRS CPAP 4/> PARM: CPT | Mod: 26,,, | Performed by: PSYCHOLOGIST

## 2017-05-25 PROCEDURE — 95811 POLYSOM 6/>YRS CPAP 4/> PARM: CPT

## 2017-05-26 ENCOUNTER — PATIENT MESSAGE (OUTPATIENT)
Dept: NEPHROLOGY | Facility: HOSPITAL | Age: 37
End: 2017-05-26

## 2017-05-30 DIAGNOSIS — G47.33 OSA (OBSTRUCTIVE SLEEP APNEA): Primary | ICD-10-CM

## 2017-06-30 ENCOUNTER — OFFICE VISIT (OUTPATIENT)
Dept: INTERNAL MEDICINE | Facility: CLINIC | Age: 37
End: 2017-06-30
Payer: COMMERCIAL

## 2017-06-30 VITALS
HEIGHT: 72 IN | BODY MASS INDEX: 42.66 KG/M2 | WEIGHT: 315 LBS | DIASTOLIC BLOOD PRESSURE: 90 MMHG | HEART RATE: 76 BPM | TEMPERATURE: 97 F | SYSTOLIC BLOOD PRESSURE: 140 MMHG

## 2017-06-30 DIAGNOSIS — G60.9 IDIOPATHIC PERIPHERAL NEUROPATHY: ICD-10-CM

## 2017-06-30 DIAGNOSIS — M77.12 LATERAL EPICONDYLITIS OF LEFT ELBOW: ICD-10-CM

## 2017-06-30 DIAGNOSIS — M67.472 GANGLION CYST OF LEFT FOOT: ICD-10-CM

## 2017-06-30 PROCEDURE — 99999 PR PBB SHADOW E&M-EST. PATIENT-LVL III: CPT | Mod: PBBFAC,,, | Performed by: INTERNAL MEDICINE

## 2017-06-30 PROCEDURE — 99214 OFFICE O/P EST MOD 30 MIN: CPT | Mod: S$GLB,,, | Performed by: INTERNAL MEDICINE

## 2017-06-30 RX ORDER — GABAPENTIN 300 MG/1
300 CAPSULE ORAL NIGHTLY
Qty: 30 CAPSULE | Refills: 11 | Status: SHIPPED | OUTPATIENT
Start: 2017-06-30 | End: 2019-01-16

## 2017-06-30 NOTE — ASSESSMENT & PLAN NOTE
Continue to wear strap as much as possible.      Referal physical therapy.  Continue tylenol for discomfort.

## 2017-06-30 NOTE — PROGRESS NOTES
Subjective:       Patient ID: Juan Pablo Saeed is a 36 y.o. male.    Chief Complaint: Follow-up    HPI  patient is a 36-year-old male presenting today follow-up on several issues.  First off is his peripheral neuropathy.  He's had issues with peripheral neuropathy for a couple of years.  It has gotten steadily worse and at this point he is really struggling with it from a pain standpoint.  He is interested in seeing what we might be other do to help with it.  He notes the Tylenol is not doing for it.  He is not a take anti-inflammatories due to his renal insufficiency.  We talked about initiation of gabapentin as treatment and he is willing to try that.    Patient also has lateral condyle mellitus on left elbow.  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  He is not really able this point to alter that work situation.  As mentioned above he is not able take anti-inflammatories.  He has been wearing a tennis elbow strap.  He is felt that to help a little bit.  We discussed the option of doing some physical therapy and he thinks he can work that into his schedule so we'll look at doing that.    He has a ganglion cyst on his left foot.  He was diagnosed with this at his last visit.  He states it is not causing any pain or discomfort but he did want have it looked at again and confirmed the diagnosis.  He describes a bump on the lateral aspect of the left foot.  It's about the mid shaft of the metatarsal.    Review of Systems   Constitutional: Negative for fever and unexpected weight change.   Respiratory: Negative for cough, shortness of breath and wheezing.    Cardiovascular: Negative for chest pain and palpitations.   Gastrointestinal: Negative for constipation, diarrhea, nausea and vomiting.   Genitourinary: Negative for dysuria and hematuria.   Musculoskeletal: Positive for arthralgias.   Neurological: Positive for numbness.       Objective:   BP (!) 140/90   Pulse 76   Temp  97 °F (36.1 °C) (Tympanic)   Ht 6' (1.829 m)   Wt (!) 168.5 kg (371 lb 7.6 oz)   BMI 50.38 kg/m²      Physical Exam   Constitutional: He appears well-developed and well-nourished.   HENT:   Head: Normocephalic and atraumatic.   Eyes: Pupils are equal, round, and reactive to light.   Neck: Neck supple. No thyromegaly present.   Cardiovascular: Normal rate, regular rhythm and normal heart sounds.  Exam reveals no gallop and no friction rub.    No murmur heard.  Pulmonary/Chest: Breath sounds normal. He has no wheezes. He has no rales.   Abdominal: Soft. Bowel sounds are normal. He exhibits no distension. There is no tenderness.   Musculoskeletal:   Tenderness at the lateral condyle of the left arm.    Small ganglion cyst midshaft of the fifth metatarsal.   Vitals reviewed.          Assessment:       1. Idiopathic peripheral neuropathy    2. Lateral epicondylitis of left elbow    3. Ganglion cyst of left foot        Plan:   Idiopathic peripheral neuropathy  Start gabapentin 300 mg at bedtime for neuropathy.  Follow up in six weeks.    Lateral epicondylitis of left elbow  Continue to wear strap as much as possible.      Referal physical therapy.  Continue tylenol for discomfort.     Ganglion cyst of left foot  No intervention at this time.  MonitorRoselia Almeida was seen today for follow-up.    Diagnoses and all orders for this visit:    Idiopathic peripheral neuropathy    Lateral epicondylitis of left elbow  -     Ambulatory Referral to Physical/Occupational Therapy    Ganglion cyst of left foot    Other orders  -     gabapentin (NEURONTIN) 300 MG capsule; Take 1 capsule (300 mg total) by mouth every evening.        Return in about 6 weeks (around 8/11/2017).

## 2017-07-18 ENCOUNTER — LAB VISIT (OUTPATIENT)
Dept: LAB | Facility: HOSPITAL | Age: 37
End: 2017-07-18
Attending: INTERNAL MEDICINE
Payer: COMMERCIAL

## 2017-07-18 DIAGNOSIS — E78.5 HYPERLIPIDEMIA, UNSPECIFIED HYPERLIPIDEMIA TYPE: ICD-10-CM

## 2017-07-18 LAB
CHOLEST/HDLC SERPL: 5.1 {RATIO}
HDL/CHOLESTEROL RATIO: 19.5 %
HDLC SERPL-MCNC: 215 MG/DL
HDLC SERPL-MCNC: 42 MG/DL
LDLC SERPL CALC-MCNC: 127.4 MG/DL
NONHDLC SERPL-MCNC: 173 MG/DL
TRIGL SERPL-MCNC: 228 MG/DL

## 2017-07-18 PROCEDURE — 36415 COLL VENOUS BLD VENIPUNCTURE: CPT | Mod: PO

## 2017-07-18 PROCEDURE — 80061 LIPID PANEL: CPT

## 2017-07-25 DIAGNOSIS — I10 ESSENTIAL HYPERTENSION: ICD-10-CM

## 2017-07-25 RX ORDER — LISINOPRIL 5 MG/1
5 TABLET ORAL DAILY
Qty: 30 TABLET | Refills: 11 | Status: SHIPPED | OUTPATIENT
Start: 2017-07-25 | End: 2017-07-26 | Stop reason: SDUPTHER

## 2017-07-25 NOTE — TELEPHONE ENCOUNTER
----- Message from Ming Coombs sent at 7/25/2017  9:42 AM CDT -----  ....1. What is the name of the medication you are requesting? Lisinopril   2. What is the dose? 5mg  3. How do you take the medication? Orally, topically, etc? Orally   4. How often do you take this medication? 1 daily   5. Do you need a 30 day or 90 day supply? 90  6. How many refills are you requesting? 3  7. What is your preferred pharmacy and location of the pharmacy? .No Pharmacies Listed  8. Who can we contact with further questions? Please call pt back at [phone]

## 2017-07-26 DIAGNOSIS — I10 ESSENTIAL HYPERTENSION: ICD-10-CM

## 2017-07-26 RX ORDER — LISINOPRIL 5 MG/1
5 TABLET ORAL DAILY
Qty: 30 TABLET | Refills: 11 | Status: SHIPPED | OUTPATIENT
Start: 2017-07-26 | End: 2018-03-21 | Stop reason: SDUPTHER

## 2017-07-26 NOTE — TELEPHONE ENCOUNTER
----- Message from Brien Yuen sent at 7/26/2017  8:54 AM CDT -----  Contact: Pt  1. What is the name of the medication you are requesting? Lisinopril  2. What is the dose? 5 mg  3. How do you take the medication? Orally, topically, etc? Orally  4. How often do you take this medication? Once a day  5. Do you need a 30 day or 90 day supply? 90  6. How many refills are you requesting? Per   7. What is your preferred pharmacy and location of the pharmacy?   [facility] [place] - [address] [facility]  [address] [place] [address]  Phone: [phone] Fax: [phone]  8. Who can we contact with further questions? Pt

## 2017-07-28 DIAGNOSIS — E78.5 HYPERLIPIDEMIA, UNSPECIFIED HYPERLIPIDEMIA TYPE: ICD-10-CM

## 2017-07-28 RX ORDER — ATORVASTATIN CALCIUM 20 MG/1
20 TABLET, FILM COATED ORAL DAILY
Qty: 30 TABLET | Refills: 11 | Status: SHIPPED | OUTPATIENT
Start: 2017-07-28 | End: 2018-02-14 | Stop reason: SDUPTHER

## 2017-08-17 ENCOUNTER — OFFICE VISIT (OUTPATIENT)
Dept: INTERNAL MEDICINE | Facility: CLINIC | Age: 37
End: 2017-08-17
Payer: COMMERCIAL

## 2017-08-17 VITALS
SYSTOLIC BLOOD PRESSURE: 120 MMHG | BODY MASS INDEX: 42.66 KG/M2 | HEIGHT: 72 IN | WEIGHT: 315 LBS | HEART RATE: 81 BPM | TEMPERATURE: 97 F | DIASTOLIC BLOOD PRESSURE: 90 MMHG

## 2017-08-17 DIAGNOSIS — M77.12 LATERAL EPICONDYLITIS OF LEFT ELBOW: ICD-10-CM

## 2017-08-17 DIAGNOSIS — G60.9 IDIOPATHIC PERIPHERAL NEUROPATHY: Primary | ICD-10-CM

## 2017-08-17 PROCEDURE — 3080F DIAST BP >= 90 MM HG: CPT | Mod: S$GLB,,, | Performed by: INTERNAL MEDICINE

## 2017-08-17 PROCEDURE — 99999 PR PBB SHADOW E&M-EST. PATIENT-LVL III: CPT | Mod: PBBFAC,,, | Performed by: INTERNAL MEDICINE

## 2017-08-17 PROCEDURE — 3008F BODY MASS INDEX DOCD: CPT | Mod: S$GLB,,, | Performed by: INTERNAL MEDICINE

## 2017-08-17 PROCEDURE — 3074F SYST BP LT 130 MM HG: CPT | Mod: S$GLB,,, | Performed by: INTERNAL MEDICINE

## 2017-08-17 PROCEDURE — 99213 OFFICE O/P EST LOW 20 MIN: CPT | Mod: S$GLB,,, | Performed by: INTERNAL MEDICINE

## 2017-08-17 RX ORDER — TRAZODONE HYDROCHLORIDE 100 MG/1
100 TABLET ORAL NIGHTLY
Qty: 30 TABLET | Refills: 11 | COMMUNITY
Start: 2017-08-17 | End: 2019-10-30 | Stop reason: SDUPTHER

## 2017-08-17 NOTE — ASSESSMENT & PLAN NOTE
Saw improvement.  May have re-injured it. Did not do PT as recommended.  Will continue tennis elbow strap.  Tylenol for pain.  If it does not improve moving forward will follow up.

## 2017-08-17 NOTE — PROGRESS NOTES
Subjective:       Patient ID: Juan Pablo Saeed is a 36 y.o. male.    Chief Complaint: Follow-up    HPI  patient is 36-year-old male coming today following up on his peripheral neuropathy and his lateral epicondylitis.  He states since his last visit he is seen improvement with the neuropathy.  The gabapentin has helped quite a bit.  He is noting no issues with tolerating the gabapentin.  He states he still has some very slight tingling in his feet but it's not enough that he will feels like he wants to go with a higher dose on the medication at this time.    The lateral, let us responded well to wearing a tennis elbow strap and some TLC.  He did not pursue physical therapy.  He had a nice response but he did reinjure it recently.  At this time he plans to go back to the regimen he was working before cousin got somewhat better and he still wants to hold off on anything more aggressive at this time.    Review of Systems    Objective:   BP (!) 120/90   Pulse 81   Temp 97 °F (36.1 °C) (Tympanic)   Ht 6' (1.829 m)   Wt (!) 164.6 kg (362 lb 14 oz)   BMI 49.21 kg/m²      Physical Exam   Constitutional: He appears well-developed and well-nourished.   HENT:   Head: Normocephalic and atraumatic.   Eyes: Pupils are equal, round, and reactive to light.   Neck: Neck supple. No thyromegaly present.   Cardiovascular: Normal rate, regular rhythm and normal heart sounds.  Exam reveals no gallop and no friction rub.    No murmur heard.  Pulmonary/Chest: Breath sounds normal. He has no wheezes. He has no rales.   Abdominal: Soft. Bowel sounds are normal. He exhibits no distension. There is no tenderness.   Musculoskeletal:   Mild tenderness in the left lateral epicondyle   Vitals reviewed.      No visits with results within 2 Week(s) from this visit.   Latest known visit with results is:   Lab Visit on 07/18/2017   Component Date Value    Cholesterol 07/18/2017 215*    Triglycerides 07/18/2017 228*    HDL 07/18/2017 42      LDL Cholesterol 07/18/2017 127.4     HDL/Chol Ratio 07/18/2017 19.5*    Total Cholesterol/HDL Ra* 07/18/2017 5.1*    Non-HDL Cholesterol 07/18/2017 173        Assessment:       1. Idiopathic peripheral neuropathy    2. Lateral epicondylitis of left elbow        Plan:   Idiopathic peripheral neuropathy  Patient has seen significant improvement with the gabapentin.  Happy with current protocol.  No changes to the medication at this time.    Lateral epicondylitis of left elbow  Saw improvement.  May have re-injured it. Did not do PT as recommended.  Will continue tennis elbow strap.  Tylenol for pain.  If it does not improve moving forward will follow up.    Juan Pablo was seen today for follow-up.    Diagnoses and all orders for this visit:    Idiopathic peripheral neuropathy  Comments:  Continue gabapentin.      Lateral epicondylitis of left elbow  Comments:  continue use of strap and tylenol.  Try to avoid offending activity.  If worsens, follow up.        Return in about 4 months (around 12/17/2017).

## 2017-08-17 NOTE — ASSESSMENT & PLAN NOTE
Patient has seen significant improvement with the gabapentin.  Happy with current protocol.  No changes to the medication at this time.

## 2018-01-23 DIAGNOSIS — N18.30 CKD (CHRONIC KIDNEY DISEASE) STAGE 3, GFR 30-59 ML/MIN: Primary | ICD-10-CM

## 2018-01-25 ENCOUNTER — LAB VISIT (OUTPATIENT)
Dept: LAB | Facility: HOSPITAL | Age: 38
End: 2018-01-25
Attending: INTERNAL MEDICINE
Payer: COMMERCIAL

## 2018-01-25 DIAGNOSIS — N18.30 CKD (CHRONIC KIDNEY DISEASE) STAGE 3, GFR 30-59 ML/MIN: ICD-10-CM

## 2018-01-25 LAB
ALBUMIN SERPL BCP-MCNC: 3.6 G/DL
ANION GAP SERPL CALC-SCNC: 9 MMOL/L
BUN SERPL-MCNC: 19 MG/DL
CALCIUM SERPL-MCNC: 8.8 MG/DL
CHLORIDE SERPL-SCNC: 105 MMOL/L
CO2 SERPL-SCNC: 27 MMOL/L
CREAT SERPL-MCNC: 1.7 MG/DL
EST. GFR  (AFRICAN AMERICAN): 57.9 ML/MIN/1.73 M^2
EST. GFR  (NON AFRICAN AMERICAN): 50 ML/MIN/1.73 M^2
GLUCOSE SERPL-MCNC: 97 MG/DL
PHOSPHATE SERPL-MCNC: 2.9 MG/DL
POTASSIUM SERPL-SCNC: 3.8 MMOL/L
SODIUM SERPL-SCNC: 141 MMOL/L

## 2018-01-25 PROCEDURE — 80069 RENAL FUNCTION PANEL: CPT

## 2018-01-25 PROCEDURE — 36415 COLL VENOUS BLD VENIPUNCTURE: CPT | Mod: PO

## 2018-02-01 ENCOUNTER — OFFICE VISIT (OUTPATIENT)
Dept: NEPHROLOGY | Facility: CLINIC | Age: 38
End: 2018-02-01
Payer: COMMERCIAL

## 2018-02-01 ENCOUNTER — OFFICE VISIT (OUTPATIENT)
Dept: OTOLARYNGOLOGY | Facility: CLINIC | Age: 38
End: 2018-02-01
Payer: COMMERCIAL

## 2018-02-01 VITALS
HEART RATE: 80 BPM | BODY MASS INDEX: 42.66 KG/M2 | HEIGHT: 72 IN | DIASTOLIC BLOOD PRESSURE: 92 MMHG | WEIGHT: 315 LBS | SYSTOLIC BLOOD PRESSURE: 134 MMHG | SYSTOLIC BLOOD PRESSURE: 134 MMHG | DIASTOLIC BLOOD PRESSURE: 92 MMHG | WEIGHT: 315 LBS | HEART RATE: 80 BPM | BODY MASS INDEX: 51.07 KG/M2

## 2018-02-01 DIAGNOSIS — R80.9 PROTEINURIA, UNSPECIFIED TYPE: ICD-10-CM

## 2018-02-01 DIAGNOSIS — H61.892 DRYNESS OF LEFT EAR CANAL: ICD-10-CM

## 2018-02-01 DIAGNOSIS — H61.893 DRY BOTH EAR CANALS: Primary | ICD-10-CM

## 2018-02-01 DIAGNOSIS — N18.30 CKD (CHRONIC KIDNEY DISEASE) STAGE 3, GFR 30-59 ML/MIN: Primary | ICD-10-CM

## 2018-02-01 PROCEDURE — 99243 OFF/OP CNSLTJ NEW/EST LOW 30: CPT | Mod: S$GLB,,, | Performed by: PHYSICIAN ASSISTANT

## 2018-02-01 PROCEDURE — 3008F BODY MASS INDEX DOCD: CPT | Mod: S$GLB,,, | Performed by: INTERNAL MEDICINE

## 2018-02-01 PROCEDURE — 99999 PR PBB SHADOW E&M-EST. PATIENT-LVL III: CPT | Mod: PBBFAC,,, | Performed by: INTERNAL MEDICINE

## 2018-02-01 PROCEDURE — 99214 OFFICE O/P EST MOD 30 MIN: CPT | Mod: S$GLB,,, | Performed by: INTERNAL MEDICINE

## 2018-02-01 PROCEDURE — 99999 PR PBB SHADOW E&M-EST. PATIENT-LVL III: CPT | Mod: PBBFAC,,, | Performed by: PHYSICIAN ASSISTANT

## 2018-02-01 NOTE — PROGRESS NOTES
PROGRESS NOTE FOR ESTABLISHED PATIENT    PHYSICIAN REQUESTING THE CONSULT: Dr. Jordin Hampton    REASON FOR VISIT: Renal insufficiency/proteinuria    37 y.o. male with history of GERD, HTN, PUD presents to the renal clinic for evaluation of renal insufficiency and proteinuria.    Patient reports left ear pain/dryness.         Past Medical History:   Diagnosis Date    Back pain     GERD (gastroesophageal reflux disease)     HTN (hypertension)     Obesity        Past Surgical History:   Procedure Laterality Date    CIRCUMCISION  2010       Review of patient's allergies indicates:  No Known Allergies    Current Outpatient Prescriptions   Medication Sig Dispense Refill    acetaminophen (TYLENOL) 100 mg/mL suspension Take by mouth every 4 (four) hours as needed for Temperature greater than.      atorvastatin (LIPITOR) 20 MG tablet Take 1 tablet (20 mg total) by mouth once daily. 30 tablet 11    gabapentin (NEURONTIN) 300 MG capsule Take 1 capsule (300 mg total) by mouth every evening. 30 capsule 11    lisinopril (PRINIVIL,ZESTRIL) 5 MG tablet Take 1 tablet (5 mg total) by mouth once daily. 30 tablet 11    trazodone (DESYREL) 100 MG tablet Take 1 tablet (100 mg total) by mouth every evening. 30 tablet 11    vitamin E 8.9 unit/g Crea Apply to affected area twice daily 112 g 2     No current facility-administered medications for this visit.        Family History   Problem Relation Age of Onset    Arthritis Mother     Cancer Mother      stomach    Diabetes Brother     Alcohol abuse Brother     Stroke Maternal Grandmother        Social History     Social History    Marital status:      Spouse name: N/A    Number of children: 0    Years of education: N/A     Occupational History    stevedore      Social History Main Topics    Smoking status: Never Smoker    Smokeless tobacco: Never Used    Alcohol use Yes    Drug use: No    Sexual activity: Yes     Other Topics Concern    Not on file     Social  History Narrative    No narrative on file       Review of Systems:  1. GENERAL: patient denies any fever, weight changes, generalized weakness, dizziness.  2. HEENT: patient denies headaches, visual disturbances, swallowing problems, sinus pain, nasal congestion, he reports left ear pain/dryness  3. CARDIOVASCULAR: patient denies chest pain, palpitations.  4. PULMONARY: patient denies SOB, coughing, hemoptysis, wheezing.  5. GASTROINTESTINAL: patient denies abdominal pain, nausea, vomiting, diarrhea.  6. GENITOURINARY: patient denies dysuria, hematuria, hesitancy, frequency.  7. EXTREMITIES: patient denies LE edema, LE cramping.  8. DERMATOLOGY: patient denies rashes, ulcers.  9. NEURO: patient denies tremors, extremity weakness, extremity numbness/tingling.  10. MUSCULOSKELETAL: patient denies joint pain, joint swelling.  11. HEMATOLOGY: patient denies rectal or gum bleeding.  12: PSYCH: patient denies anxiety, depression.      PHYSICAL EXAM:  BP (!) 134/92   Pulse 80   Ht 6' (1.829 m)   Wt (!) 170.8 kg (376 lb 8.7 oz)   BMI 51.07 kg/m²     GENERAL: Pleasant overweight gentleman presents to clinic with non-labored breathing.  HEENT: PER, no nasal discharge, no icterus, no oral exudates, moist mucosal membranes.  NECK: no thyroid mass, no lymphadenopathy.  HEART: RRR S1/S2, no rubs, good peripheral pulses.  LUNGS: CTA bilaterally, no wheezing, breathing is nonlabored.  ABDOMEN: soft, nontender, not distended, bowel sounds are present, no abdominal hernia.  EXTREM: no LE edema.  SKIN: no rashes, skin is warm and dry.  NEURO: A & O x 3, no obvious focal signs.    LABORATORY RESULTS:    Lab Results   Component Value Date    CREATININE 1.7 (H) 01/25/2018    BUN 19 01/25/2018     01/25/2018    K 3.8 01/25/2018     01/25/2018    CO2 27 01/25/2018      Lab Results   Component Value Date    PTH 49.0 05/16/2017    CALCIUM 8.8 01/25/2018    PHOS 2.9 01/25/2018     Lab Results   Component Value Date    ALBUMIN  3.6 01/25/2018     Lab Results   Component Value Date    WBC 6.23 06/29/2016    HGB 13.5 (L) 06/29/2016    HCT 40.8 06/29/2016    MCV 80 (L) 06/29/2016     06/29/2016     Protein Creatinine Ratios:    Creatinine, Random Ur   Date Value Ref Range Status   01/25/2018 124.0 23.0 - 375.0 mg/dL Final     Comment:     The random urine reference ranges provided were established   for 24 hour urine collections.  No reference ranges exist for  random urine specimens.  Correlate clinically.     05/16/2017 119.0 23.0 - 375.0 mg/dL Final     Comment:     The random urine reference ranges provided were established   for 24 hour urine collections.  No reference ranges exist for  random urine specimens.  Correlate clinically.     08/22/2016 170.0 23.0 - 375.0 mg/dL Final     Comment:     The random urine reference ranges provided were established   for 24 hour urine collections.  No reference ranges exist for  random urine specimens.  Correlate clinically.       Protein, Urine   Date Value Ref Range Status   03/19/2015 111 (H) 0 - 15 mg/dL Final   02/26/2015 113 (H) 0 - 15 mg/dL Final     Protein, Urine Random   Date Value Ref Range Status   01/25/2018 10 0 - 15 mg/dL Final     Comment:     The random urine reference ranges provided were established   for 24 hour urine collections.  No reference ranges exist for  random urine specimens.  Correlate clinically.     05/16/2017 21 (H) 0 - 15 mg/dL Final     Comment:     The random urine reference ranges provided were established   for 24 hour urine collections.  No reference ranges exist for  random urine specimens.  Correlate clinically.     08/22/2016 58 (H) 0 - 15 mg/dL Final     Comment:     The random urine reference ranges provided were established   for 24 hour urine collections.  No reference ranges exist for  random urine specimens.  Correlate clinically.       Prot/Creat Ratio, Ur   Date Value Ref Range Status   01/25/2018 0.08 0.00 - 0.20 Final   05/16/2017 0.18 0.00  - 0.20 Final   08/22/2016 0.34 (H) 0.00 - 0.20 Final       24 hour urinary protein: 1.8 g (3/19/15).       ASSESSMENT AND PLAN:  37 y.o. male with history of GERD, HTN, PUD presents to the renal clinic for evaluation of renal insufficiency and proteinuria.    1. Renal insufficiency/proteinuria: Patient presents with mild renal insufficiency, consistent with CKD stage 2/3. Last creatinine was 1.7. Last protein creatinine ratio from 1/25/18 was 0.08. Patient is on lisinopril which will be continued. Serologies and HgA1c negative/within normal limits. He will return to the clinic in 4-5 months for follow up. Will follow up on today's labs. Patient was advised to avoid NSAID pain medications such as advil, aleve, motrin, ibuprofen, naprosyn, meloxicam, diclofenac, mobic.     2. Electrolytes: At goal.     3. Acid base status: No acute issues.      4. Volume: Euvolemic.    5. Hypertension: Good BP control.     6. Medications: Reviewed. Agree with current medical regimen. Patient was advised to avoid NSAID pain medications such as advil, aleve, motrin, ibuprofen, naprosyn, meloxicam, diclofenac, mobic. Continue Lisinopril.

## 2018-02-01 NOTE — PROGRESS NOTES
Subjective:       Patient ID: Juan Pablo Saeed is a 37 y.o. male.    Chief Complaint: Consult (dry skin on outside of ear)    Patient is a very pleasant 37 year old male here to see me today for the first time in consultation at the request of Dr. Mcclure for evaluation of dry skin on left external ear.  He says his L>R external ears have been dry for past 6+ months.  He's using Vitamin E lotion which helps.  He denies pain or itchy associated with the dry skin.  He says this area will sometimes be flaky.  Denies redness or swelling of the ears.  Denies hearing loss; denies tinnitus.  He has not had any recent issues with ear pain or ear drainage.  He denies a family history of hearing loss, and has not had any previous otologic surgery.  He has a history of significant loud noise exposure (oocupational) and has annual audiograms at work which he says have been normal. He denies issues with dizziness.  No history of eczema or asthma.  Denies fever.        Review of Systems   Constitutional: Negative for fatigue and fever.   HENT: Negative for congestion, ear discharge, ear pain, facial swelling, hearing loss, nosebleeds, postnasal drip, rhinorrhea, sinus pressure, sneezing, sore throat and tinnitus.    Eyes: Negative for discharge, redness and itching.   Respiratory: Negative for cough, choking, shortness of breath and wheezing.         MOSHE   Cardiovascular: Negative for chest pain and palpitations.   Gastrointestinal: Negative for abdominal pain, diarrhea and vomiting.        Has reflux.   Musculoskeletal: Negative for neck pain.   Skin:        Dry skin on ears   Allergic/Immunologic: Negative for environmental allergies.   Neurological: Negative for dizziness, facial asymmetry, light-headedness and headaches.   Hematological: Negative for adenopathy. Does not bruise/bleed easily.   Psychiatric/Behavioral: Positive for sleep disturbance. Negative for agitation, behavioral problems, confusion and decreased  concentration.       Objective:      Physical Exam   Constitutional: He is oriented to person, place, and time. Vital signs are normal. He appears well-developed and well-nourished. He is cooperative. No distress.   HENT:   Head: Normocephalic and atraumatic.   Right Ear: Hearing, tympanic membrane, external ear and ear canal normal. No drainage, swelling or tenderness. No middle ear effusion.   Left Ear: Hearing, tympanic membrane, external ear and ear canal normal. No drainage, swelling or tenderness.  No middle ear effusion.   Ears:    Nose: No mucosal edema, rhinorrhea, nasal deformity or septal deviation.   Mouth/Throat: Uvula is midline, oropharynx is clear and moist and mucous membranes are normal. No trismus in the jaw. Normal dentition. No uvula swelling. No oropharyngeal exudate or posterior oropharyngeal edema.   Eyes: Conjunctivae and EOM are normal. Pupils are equal, round, and reactive to light. Right eye exhibits no chemosis. Left eye exhibits no chemosis. Right conjunctiva is not injected. Left conjunctiva is not injected. No scleral icterus.   Neck: Trachea normal and phonation normal. No tracheal tenderness present. No tracheal deviation present. No thyroid mass and no thyromegaly present.   Cardiovascular: Intact distal pulses.    Pulmonary/Chest: Effort normal. No accessory muscle usage or stridor. No respiratory distress.   Lymphadenopathy:        Head (right side): No submental, no submandibular, no preauricular and no posterior auricular adenopathy present.        Head (left side): No submental, no submandibular, no preauricular and no posterior auricular adenopathy present.     He has no cervical adenopathy.        Right cervical: No superficial cervical and no deep cervical adenopathy present.       Left cervical: No superficial cervical and no deep cervical adenopathy present.   Neurological: He is alert and oriented to person, place, and time. No cranial nerve deficit.   Skin: Skin is warm  and dry. No rash noted. No erythema.   Psychiatric: He has a normal mood and affect. His behavior is normal. Thought content normal.       Assessment:       1. Dry both ear canals        Plan:         Reassured patient that his ears today look great.   I reassured patient that I do not see any suspicious growths or ulcerations on his external ears.  And I don't see any signs of eczematous OE either.  It's not uncommon for skin to become dry, especially in the winter months and I would recommend he continue to use the Vitamin E lotion on his external ears as that's been helpful for him.  Instructed him to call if he ever has pain, drainage or ulceration associated with the dry skin on his ears as that can indicate infection or possible malignant changes.  OK to continue his weekly use of a wax softening drop, either over the counter Debrox or mineral oil.  I also instructed the patient to avoid Qtips.    Thanks for the referral Dr. Mcclure.  Report returned via EPIC.

## 2018-02-14 DIAGNOSIS — E78.5 HYPERLIPIDEMIA, UNSPECIFIED HYPERLIPIDEMIA TYPE: ICD-10-CM

## 2018-02-14 RX ORDER — ATORVASTATIN CALCIUM 20 MG/1
20 TABLET, FILM COATED ORAL DAILY
Qty: 30 TABLET | Refills: 11 | Status: SHIPPED | OUTPATIENT
Start: 2018-02-14 | End: 2019-01-16 | Stop reason: SDUPTHER

## 2018-02-27 ENCOUNTER — OFFICE VISIT (OUTPATIENT)
Dept: CARDIOLOGY | Facility: CLINIC | Age: 38
End: 2018-02-27
Payer: COMMERCIAL

## 2018-02-27 ENCOUNTER — CLINICAL SUPPORT (OUTPATIENT)
Dept: CARDIOLOGY | Facility: CLINIC | Age: 38
End: 2018-02-27
Payer: COMMERCIAL

## 2018-02-27 VITALS
HEIGHT: 72 IN | BODY MASS INDEX: 42.66 KG/M2 | DIASTOLIC BLOOD PRESSURE: 72 MMHG | WEIGHT: 315 LBS | SYSTOLIC BLOOD PRESSURE: 114 MMHG | HEART RATE: 70 BPM

## 2018-02-27 DIAGNOSIS — R07.9 CHEST PAIN: ICD-10-CM

## 2018-02-27 DIAGNOSIS — E78.2 MIXED HYPERLIPIDEMIA: ICD-10-CM

## 2018-02-27 DIAGNOSIS — I10 ESSENTIAL HYPERTENSION: ICD-10-CM

## 2018-02-27 DIAGNOSIS — R07.89 ATYPICAL CHEST PAIN: ICD-10-CM

## 2018-02-27 DIAGNOSIS — R07.89 ATYPICAL CHEST PAIN: Primary | ICD-10-CM

## 2018-02-27 PROCEDURE — 99204 OFFICE O/P NEW MOD 45 MIN: CPT | Mod: S$GLB,,, | Performed by: INTERNAL MEDICINE

## 2018-02-27 PROCEDURE — 3008F BODY MASS INDEX DOCD: CPT | Mod: S$GLB,,, | Performed by: INTERNAL MEDICINE

## 2018-02-27 PROCEDURE — 93000 ELECTROCARDIOGRAM COMPLETE: CPT | Mod: S$GLB,,, | Performed by: INTERNAL MEDICINE

## 2018-02-27 PROCEDURE — 99999 PR PBB SHADOW E&M-EST. PATIENT-LVL III: CPT | Mod: PBBFAC,,, | Performed by: INTERNAL MEDICINE

## 2018-02-27 NOTE — PROGRESS NOTES
"Subjective:    Patient ID:  Juan Pablo Saeed is a 37 y.o. male who presents for evaluation of Chest Pain      HPI  Pt presents for cp.  He has HTN, obesity, hyperlipidemia.  Nonsmoker.  He has atypical cp, both sides of chest on lateral side, not in middle.  CP occurs maybe every few months, for 2 years.  He can "dull pain" if he massages it.  He thinks it could be stress related.  He knew a few people young who have  of MI recently.  Sometimes he could get a cp with activity.   BP controlled.  Lipids improved last panel; on statin now.     Review of Systems   Constitution: Negative.   HENT: Negative.    Eyes: Negative.    Cardiovascular: Positive for chest pain.   Respiratory: Negative.    Endocrine: Negative.    Hematologic/Lymphatic: Negative.    Skin: Negative.    Musculoskeletal: Negative.    Gastrointestinal: Negative.    Genitourinary: Negative.    Neurological: Negative.    Psychiatric/Behavioral: Negative.    Allergic/Immunologic: Negative.        /72 (BP Location: Left arm)   Pulse 70   Ht 6' (1.829 m)   Wt (!) 169.6 kg (374 lb)   BMI 50.72 kg/m²          Objective:    Physical Exam   Constitutional: He is oriented to person, place, and time. He appears well-developed and well-nourished.   HENT:   Head: Normocephalic.   Neck: Normal range of motion. Neck supple. Normal carotid pulses, no hepatojugular reflux and no JVD present. Carotid bruit is not present. No thyromegaly present.   Cardiovascular: Normal rate, regular rhythm, S1 normal and S2 normal.  PMI is not displaced.  Exam reveals no S3, no S4, no distant heart sounds, no friction rub, no midsystolic click and no opening snap.    No murmur heard.  Pulses:       Radial pulses are 2+ on the right side, and 2+ on the left side.   Pulmonary/Chest: Effort normal and breath sounds normal. He has no wheezes. He has no rales.   Abdominal: Soft. Bowel sounds are normal. He exhibits no distension, no abdominal bruit, no ascites and no mass. " There is no tenderness.   Musculoskeletal: He exhibits no edema.   Neurological: He is alert and oriented to person, place, and time.   Skin: Skin is warm.   Psychiatric: He has a normal mood and affect. His behavior is normal.   Nursing note and vitals reviewed.      I have reviewed all pertinent labs and cardiac studies.      Chemistry        Component Value Date/Time     01/25/2018 0906    K 3.8 01/25/2018 0906     01/25/2018 0906    CO2 27 01/25/2018 0906    BUN 19 01/25/2018 0906    CREATININE 1.7 (H) 01/25/2018 0906    GLU 97 01/25/2018 0906        Component Value Date/Time    CALCIUM 8.8 01/25/2018 0906    ALKPHOS 53 (L) 06/29/2016 1420    AST 27 06/29/2016 1420    ALT 29 06/29/2016 1420    BILITOT 0.5 06/29/2016 1420    ESTGFRAFRICA 57.9 (A) 01/25/2018 0906    EGFRNONAA 50.0 (A) 01/25/2018 0906        Lab Results   Component Value Date    WBC 6.23 06/29/2016    HGB 13.5 (L) 06/29/2016    HCT 40.8 06/29/2016    MCV 80 (L) 06/29/2016     06/29/2016     Lab Results   Component Value Date    HGBA1C 5.7 08/22/2016     Lab Results   Component Value Date    CHOL 215 (H) 07/18/2017    CHOL 265 (H) 01/13/2017    CHOL 252 (H) 06/29/2016     Lab Results   Component Value Date    HDL 42 07/18/2017    HDL 31 (L) 01/13/2017    HDL 34 (L) 06/29/2016     Lab Results   Component Value Date    LDLCALC 127.4 07/18/2017    LDLCALC 162.8 (H) 01/13/2017    LDLCALC 164.6 (H) 06/29/2016     Lab Results   Component Value Date    TRIG 228 (H) 07/18/2017    TRIG 356 (H) 01/13/2017    TRIG 267 (H) 06/29/2016     Lab Results   Component Value Date    CHOLHDL 19.5 (L) 07/18/2017    CHOLHDL 11.7 (L) 01/13/2017    CHOLHDL 13.5 (L) 06/29/2016           Assessment:       1. Atypical chest pain    2. Essential hypertension    3. Mixed hyperlipidemia    4. Chest pain         Plan:             Atypical cp.  Multiple risk factors for CAD.  Likely non-cardiac, perhaps stress or MSK type discomfort.  Pt counseled on risk factor  modification  Daily exercise  Cardiac diet  Weight loss  Treadmill stress test/echo advised.    Phone review for test results.

## 2018-03-06 ENCOUNTER — PATIENT MESSAGE (OUTPATIENT)
Dept: INTERNAL MEDICINE | Facility: CLINIC | Age: 38
End: 2018-03-06

## 2018-03-15 ENCOUNTER — PATIENT OUTREACH (OUTPATIENT)
Dept: ADMINISTRATIVE | Facility: HOSPITAL | Age: 38
End: 2018-03-15

## 2018-03-15 NOTE — PROGRESS NOTES
PATIENT IS ON THE [employer] OPTIMAL CKD REPORT. DOS 8/17/17 B/P WAS UNCONTROLLED. PATIENT IS ON AN ACE, AND HAS HAD A CARDIOLOGY VISIT WITH CONTROLLED B/P ON 2/27/2018. PATIENT IS SCHEDULED TO SEE DR. SHEN ON 3/21/18. I HAVE ADDED A LPN-CC NOTE FOR DR. SHEN TO NOTIFY HIM OF THE PATIENT BEING ON THE CKD REPORT.

## 2018-03-21 ENCOUNTER — OFFICE VISIT (OUTPATIENT)
Dept: INTERNAL MEDICINE | Facility: CLINIC | Age: 38
End: 2018-03-21
Payer: COMMERCIAL

## 2018-03-21 VITALS
HEART RATE: 72 BPM | SYSTOLIC BLOOD PRESSURE: 130 MMHG | DIASTOLIC BLOOD PRESSURE: 84 MMHG | HEIGHT: 72 IN | WEIGHT: 315 LBS | TEMPERATURE: 98 F | BODY MASS INDEX: 42.66 KG/M2

## 2018-03-21 DIAGNOSIS — I10 ESSENTIAL HYPERTENSION: ICD-10-CM

## 2018-03-21 DIAGNOSIS — K92.1 BLOOD IN STOOL: Primary | ICD-10-CM

## 2018-03-21 DIAGNOSIS — K64.8 OTHER HEMORRHOIDS: ICD-10-CM

## 2018-03-21 DIAGNOSIS — N18.30 STAGE 3 CHRONIC KIDNEY DISEASE: ICD-10-CM

## 2018-03-21 PROCEDURE — 99214 OFFICE O/P EST MOD 30 MIN: CPT | Mod: S$GLB,,, | Performed by: INTERNAL MEDICINE

## 2018-03-21 PROCEDURE — 99999 PR PBB SHADOW E&M-EST. PATIENT-LVL III: CPT | Mod: PBBFAC,,, | Performed by: INTERNAL MEDICINE

## 2018-03-21 PROCEDURE — 3079F DIAST BP 80-89 MM HG: CPT | Mod: CPTII,S$GLB,, | Performed by: INTERNAL MEDICINE

## 2018-03-21 PROCEDURE — 3075F SYST BP GE 130 - 139MM HG: CPT | Mod: CPTII,S$GLB,, | Performed by: INTERNAL MEDICINE

## 2018-03-21 RX ORDER — LISINOPRIL 10 MG/1
10 TABLET ORAL DAILY
Qty: 30 TABLET | Refills: 11 | Status: SHIPPED | OUTPATIENT
Start: 2018-03-21 | End: 2019-03-15 | Stop reason: SDUPTHER

## 2018-04-02 ENCOUNTER — TELEPHONE (OUTPATIENT)
Dept: CARDIOLOGY | Facility: CLINIC | Age: 38
End: 2018-04-02

## 2018-04-02 NOTE — TELEPHONE ENCOUNTER
Elisabeth Bhat- I was reviewing Mr. Saeed' medical record I see that he is above the weight limit for our treadmill machines (limit #350)- therefore I can not complete his test. Is there another pharmaceutical test you would want? Please let me know your thoughts.

## 2018-04-04 DIAGNOSIS — R07.9 CHRONIC CHEST PAIN: ICD-10-CM

## 2018-04-04 DIAGNOSIS — R07.89 ATYPICAL CHEST PAIN: Primary | ICD-10-CM

## 2018-04-04 DIAGNOSIS — G89.29 CHRONIC CHEST PAIN: ICD-10-CM

## 2018-04-04 DIAGNOSIS — I10 ESSENTIAL HYPERTENSION: ICD-10-CM

## 2018-05-04 ENCOUNTER — PATIENT OUTREACH (OUTPATIENT)
Dept: ADMINISTRATIVE | Facility: HOSPITAL | Age: 38
End: 2018-05-04

## 2018-07-12 ENCOUNTER — PATIENT MESSAGE (OUTPATIENT)
Dept: INTERNAL MEDICINE | Facility: CLINIC | Age: 38
End: 2018-07-12

## 2019-01-16 ENCOUNTER — OFFICE VISIT (OUTPATIENT)
Dept: INTERNAL MEDICINE | Facility: CLINIC | Age: 39
End: 2019-01-16
Payer: COMMERCIAL

## 2019-01-16 VITALS
BODY MASS INDEX: 42.66 KG/M2 | HEART RATE: 78 BPM | HEIGHT: 72 IN | SYSTOLIC BLOOD PRESSURE: 128 MMHG | DIASTOLIC BLOOD PRESSURE: 80 MMHG | WEIGHT: 315 LBS | TEMPERATURE: 97 F

## 2019-01-16 DIAGNOSIS — G60.9 IDIOPATHIC PERIPHERAL NEUROPATHY: ICD-10-CM

## 2019-01-16 DIAGNOSIS — E66.01 MORBID OBESITY WITH BODY MASS INDEX (BMI) OF 50.0 TO 59.9 IN ADULT: ICD-10-CM

## 2019-01-16 DIAGNOSIS — K21.9 GASTROESOPHAGEAL REFLUX DISEASE, ESOPHAGITIS PRESENCE NOT SPECIFIED: ICD-10-CM

## 2019-01-16 DIAGNOSIS — E78.5 HYPERLIPIDEMIA, UNSPECIFIED HYPERLIPIDEMIA TYPE: ICD-10-CM

## 2019-01-16 DIAGNOSIS — Z00.00 ROUTINE GENERAL MEDICAL EXAMINATION AT HEALTH CARE FACILITY: Primary | ICD-10-CM

## 2019-01-16 DIAGNOSIS — G47.33 OSA (OBSTRUCTIVE SLEEP APNEA): ICD-10-CM

## 2019-01-16 DIAGNOSIS — I10 ESSENTIAL HYPERTENSION: ICD-10-CM

## 2019-01-16 DIAGNOSIS — E78.2 MIXED HYPERLIPIDEMIA: ICD-10-CM

## 2019-01-16 PROBLEM — R07.89 ATYPICAL CHEST PAIN: Status: RESOLVED | Noted: 2018-02-27 | Resolved: 2019-01-16

## 2019-01-16 PROCEDURE — 99395 PR PREVENTIVE VISIT,EST,18-39: ICD-10-PCS | Mod: S$GLB,,, | Performed by: INTERNAL MEDICINE

## 2019-01-16 PROCEDURE — 3079F DIAST BP 80-89 MM HG: CPT | Mod: CPTII,S$GLB,, | Performed by: INTERNAL MEDICINE

## 2019-01-16 PROCEDURE — 3079F PR MOST RECENT DIASTOLIC BLOOD PRESSURE 80-89 MM HG: ICD-10-PCS | Mod: CPTII,S$GLB,, | Performed by: INTERNAL MEDICINE

## 2019-01-16 PROCEDURE — 3074F PR MOST RECENT SYSTOLIC BLOOD PRESSURE < 130 MM HG: ICD-10-PCS | Mod: CPTII,S$GLB,, | Performed by: INTERNAL MEDICINE

## 2019-01-16 PROCEDURE — 99999 PR PBB SHADOW E&M-EST. PATIENT-LVL III: ICD-10-PCS | Mod: PBBFAC,,, | Performed by: INTERNAL MEDICINE

## 2019-01-16 PROCEDURE — 99999 PR PBB SHADOW E&M-EST. PATIENT-LVL III: CPT | Mod: PBBFAC,,, | Performed by: INTERNAL MEDICINE

## 2019-01-16 PROCEDURE — 99395 PREV VISIT EST AGE 18-39: CPT | Mod: S$GLB,,, | Performed by: INTERNAL MEDICINE

## 2019-01-16 PROCEDURE — 3074F SYST BP LT 130 MM HG: CPT | Mod: CPTII,S$GLB,, | Performed by: INTERNAL MEDICINE

## 2019-01-16 RX ORDER — GABAPENTIN 300 MG/1
300 CAPSULE ORAL 2 TIMES DAILY
Qty: 60 CAPSULE | Refills: 11 | Status: SHIPPED | OUTPATIENT
Start: 2019-01-16 | End: 2019-09-19

## 2019-01-16 RX ORDER — PANTOPRAZOLE SODIUM 40 MG/1
40 TABLET, DELAYED RELEASE ORAL DAILY
Qty: 30 TABLET | Refills: 11 | Status: SHIPPED | OUTPATIENT
Start: 2019-01-16 | End: 2019-01-31

## 2019-01-16 RX ORDER — ATORVASTATIN CALCIUM 20 MG/1
20 TABLET, FILM COATED ORAL DAILY
Qty: 30 TABLET | Refills: 11 | Status: SHIPPED | OUTPATIENT
Start: 2019-01-16 | End: 2020-01-23 | Stop reason: SDUPTHER

## 2019-01-16 NOTE — PROGRESS NOTES
Subjective:       Patient ID: Juan Pablo Saeed is a 38 y.o. male.    Chief Complaint: Annual Exam    HPI  Patient is a 38-year-old male presenting today for updated physical exam review of chronic health issues.  Patient has a history of hypertension, sleep apnea, hyperlipidemia, peripheral neuropathy, reflux, morbid obesity.    Overall he feels like he has been doing pretty good he is taking his medications as prescribed.  He relates that his neuropathy is not well controlled with the gabapentin.  He is taking gabapentin once daily and it seemed to help initially but it does not seem to be helping as much at this point.  He continues to have pins and needle sensation and pain in his hands and feet bilaterally.    He is also having some issues with reflux.  He states 3 4 times a week he has reflux at night when he is lying in bed.  He has not had any hematemesis.  He is taking Tums for it which has been beneficial.  He denies significant use of caffeine.    He is struggling with his weight.  He is had weight problems most of his life.  He states he is trying to work on diet but he has struggled quite a bit.  We discussed the possibility of bariatric.  He is not opposed to pursuing bariatric surgery.  He has had several family members who have done it and he feels like he has a good understanding of it.  We discussed calorie restriction and some with applications that could be gotten for phones can help with that but he is interested in talking to the bariatric steam about what his options would be there.  He is not sure of his insurance coverage.    Review of Systems   Constitutional: Negative for fever and unexpected weight change.   HENT: Negative for hearing loss, postnasal drip and rhinorrhea.    Eyes: Negative for pain and visual disturbance.   Respiratory: Negative for cough, shortness of breath and wheezing.    Cardiovascular: Positive for palpitations. Negative for chest pain.   Gastrointestinal: Negative  for constipation, diarrhea, nausea and vomiting.   Genitourinary: Negative for dysuria and hematuria.   Musculoskeletal: Positive for neck pain. Negative for arthralgias, back pain, myalgias and neck stiffness.   Skin: Negative for pallor and rash.   Neurological: Positive for headaches. Negative for seizures and syncope.   Hematological: Negative for adenopathy.   Psychiatric/Behavioral: Negative for dysphoric mood. The patient is not nervous/anxious.        Objective:   /80   Pulse 78   Temp 97 °F (36.1 °C) (Tympanic)   Ht 6' (1.829 m)   Wt (!) 171 kg (376 lb 15.8 oz)   BMI 51.13 kg/m²      Physical Exam   Constitutional: He is oriented to person, place, and time. He appears well-developed and well-nourished. No distress.   HENT:   Head: Normocephalic and atraumatic.   Right Ear: External ear normal.   Left Ear: External ear normal.   Mouth/Throat: Oropharynx is clear and moist.   Eyes: EOM are normal. Pupils are equal, round, and reactive to light.   Neck: Normal range of motion. Neck supple. No JVD present. No thyromegaly present.   Cardiovascular: Normal rate, regular rhythm, normal heart sounds and intact distal pulses. Exam reveals no gallop and no friction rub.   No murmur heard.  Pulmonary/Chest: Effort normal and breath sounds normal. He has no wheezes. He has no rales.   Abdominal: Soft. Bowel sounds are normal. He exhibits no distension. There is no tenderness. There is no rebound and no guarding.   Musculoskeletal: Normal range of motion. He exhibits no edema.   Lymphadenopathy:     He has no cervical adenopathy.   Neurological: He is alert and oriented to person, place, and time. He has normal reflexes. No cranial nerve deficit.   Skin: Skin is warm and dry. No rash noted.   Psychiatric: He has a normal mood and affect. Judgment normal.   Vitals reviewed.          Assessment:       1. Routine general medical examination at health care facility    2. Essential hypertension    3. MOSHE  (obstructive sleep apnea)    4. Mixed hyperlipidemia    5. Idiopathic peripheral neuropathy    6. Gastroesophageal reflux disease, esophagitis presence not specified    7. Morbid obesity with body mass index (BMI) of 50.0 to 59.9 in adult    8. Hyperlipidemia, unspecified hyperlipidemia type        Plan:   Essential hypertension  Blood pressure is under good control.  We will continue the current regimen.  Will work on regular aerobic exercise and a low salt diet.        MOSHE (obstructive sleep apnea)  Patient is compliant with use of cpap, using it the majority of nights.  Benefit from the use of the device is noted.      Idiopathic peripheral neuropathy  Increase gabapentin to 300 mg twice daily    Juan Pablo was seen today for annual exam.    Diagnoses and all orders for this visit:    Routine general medical examination at health care facility  Comments:  Focus on good health habits, regular exercise, seatbelt use, weight loss.  Orders:  -     CBC auto differential; Future  -     Comprehensive metabolic panel; Future  -     Lipid panel; Future  -     Hemoglobin A1c; Future    Essential hypertension    MOSHE (obstructive sleep apnea)    Mixed hyperlipidemia    Idiopathic peripheral neuropathy  Comments:  Increase gabapentin to 300 mg twice daily  Orders:  -     gabapentin (NEURONTIN) 300 MG capsule; Take 1 capsule (300 mg total) by mouth 2 (two) times daily.    Gastroesophageal reflux disease, esophagitis presence not specified  Comments:  Start pantoprazole 40 mg once daily.  Elevate the head of the bed, avoid caffeine, avoid eating within 2 hours of bedtime.  Orders:  -     pantoprazole (PROTONIX) 40 MG tablet; Take 1 tablet (40 mg total) by mouth once daily.    Morbid obesity with body mass index (BMI) of 50.0 to 59.9 in adult  Comments:  Limit calorie intake, strive for 1800 calories daily.  Consult bariatric surgery  Orders:  -     Ambulatory consult to Bariatric Surgery ([place])    Hyperlipidemia, unspecified  hyperlipidemia type  -     atorvastatin (LIPITOR) 20 MG tablet; Take 1 tablet (20 mg total) by mouth once daily.          Follow-up in about 4 weeks (around 2/13/2019).

## 2019-01-17 ENCOUNTER — LAB VISIT (OUTPATIENT)
Dept: LAB | Facility: HOSPITAL | Age: 39
End: 2019-01-17
Attending: INTERNAL MEDICINE
Payer: COMMERCIAL

## 2019-01-17 DIAGNOSIS — Z00.00 ROUTINE GENERAL MEDICAL EXAMINATION AT HEALTH CARE FACILITY: ICD-10-CM

## 2019-01-17 LAB
ALBUMIN SERPL BCP-MCNC: 4 G/DL
ALP SERPL-CCNC: 56 U/L
ALT SERPL W/O P-5'-P-CCNC: 35 U/L
ANION GAP SERPL CALC-SCNC: 9 MMOL/L
AST SERPL-CCNC: 30 U/L
BASOPHILS # BLD AUTO: 0.03 K/UL
BASOPHILS NFR BLD: 0.6 %
BILIRUB SERPL-MCNC: 0.8 MG/DL
BUN SERPL-MCNC: 16 MG/DL
CALCIUM SERPL-MCNC: 10.4 MG/DL
CHLORIDE SERPL-SCNC: 103 MMOL/L
CHOLEST SERPL-MCNC: 171 MG/DL
CHOLEST/HDLC SERPL: 4.4 {RATIO}
CO2 SERPL-SCNC: 28 MMOL/L
CREAT SERPL-MCNC: 1.7 MG/DL
DIFFERENTIAL METHOD: ABNORMAL
EOSINOPHIL # BLD AUTO: 0.1 K/UL
EOSINOPHIL NFR BLD: 2 %
ERYTHROCYTE [DISTWIDTH] IN BLOOD BY AUTOMATED COUNT: 13.8 %
EST. GFR  (AFRICAN AMERICAN): 57.4 ML/MIN/1.73 M^2
EST. GFR  (NON AFRICAN AMERICAN): 49.7 ML/MIN/1.73 M^2
GLUCOSE SERPL-MCNC: 95 MG/DL
HCT VFR BLD AUTO: 43.3 %
HDLC SERPL-MCNC: 39 MG/DL
HDLC SERPL: 22.8 %
HGB BLD-MCNC: 13.6 G/DL
IMM GRANULOCYTES # BLD AUTO: 0.01 K/UL
IMM GRANULOCYTES NFR BLD AUTO: 0.2 %
LDLC SERPL CALC-MCNC: 90.2 MG/DL
LYMPHOCYTES # BLD AUTO: 2 K/UL
LYMPHOCYTES NFR BLD: 40.8 %
MCH RBC QN AUTO: 25.6 PG
MCHC RBC AUTO-ENTMCNC: 31.4 G/DL
MCV RBC AUTO: 81 FL
MONOCYTES # BLD AUTO: 0.7 K/UL
MONOCYTES NFR BLD: 13.7 %
NEUTROPHILS # BLD AUTO: 2.1 K/UL
NEUTROPHILS NFR BLD: 42.7 %
NONHDLC SERPL-MCNC: 132 MG/DL
NRBC BLD-RTO: 0 /100 WBC
PLATELET # BLD AUTO: 244 K/UL
PMV BLD AUTO: 10.5 FL
POTASSIUM SERPL-SCNC: 4.1 MMOL/L
PROT SERPL-MCNC: 8.1 G/DL
RBC # BLD AUTO: 5.32 M/UL
SODIUM SERPL-SCNC: 140 MMOL/L
TRIGL SERPL-MCNC: 209 MG/DL
WBC # BLD AUTO: 4.97 K/UL

## 2019-01-17 PROCEDURE — 83036 HEMOGLOBIN GLYCOSYLATED A1C: CPT

## 2019-01-17 PROCEDURE — 80061 LIPID PANEL: CPT

## 2019-01-17 PROCEDURE — 36415 COLL VENOUS BLD VENIPUNCTURE: CPT | Mod: PO

## 2019-01-17 PROCEDURE — 85025 COMPLETE CBC W/AUTO DIFF WBC: CPT

## 2019-01-17 PROCEDURE — 80053 COMPREHEN METABOLIC PANEL: CPT

## 2019-01-18 LAB
ESTIMATED AVG GLUCOSE: 120 MG/DL
HBA1C MFR BLD HPLC: 5.8 %

## 2019-01-31 ENCOUNTER — OFFICE VISIT (OUTPATIENT)
Dept: INTERNAL MEDICINE | Facility: CLINIC | Age: 39
End: 2019-01-31
Payer: COMMERCIAL

## 2019-01-31 ENCOUNTER — PATIENT MESSAGE (OUTPATIENT)
Dept: INTERNAL MEDICINE | Facility: CLINIC | Age: 39
End: 2019-01-31

## 2019-01-31 VITALS
HEART RATE: 68 BPM | TEMPERATURE: 99 F | DIASTOLIC BLOOD PRESSURE: 82 MMHG | WEIGHT: 315 LBS | BODY MASS INDEX: 42.66 KG/M2 | SYSTOLIC BLOOD PRESSURE: 112 MMHG | HEIGHT: 72 IN

## 2019-01-31 DIAGNOSIS — R13.10 DYSPHAGIA, UNSPECIFIED TYPE: ICD-10-CM

## 2019-01-31 DIAGNOSIS — K64.9 HEMORRHOIDS, UNSPECIFIED HEMORRHOID TYPE: ICD-10-CM

## 2019-01-31 DIAGNOSIS — K21.9 GASTROESOPHAGEAL REFLUX DISEASE, ESOPHAGITIS PRESENCE NOT SPECIFIED: ICD-10-CM

## 2019-01-31 DIAGNOSIS — R10.13 EPIGASTRIC PAIN: Primary | ICD-10-CM

## 2019-01-31 PROCEDURE — 3074F SYST BP LT 130 MM HG: CPT | Mod: CPTII,S$GLB,, | Performed by: PHYSICIAN ASSISTANT

## 2019-01-31 PROCEDURE — 99999 PR PBB SHADOW E&M-EST. PATIENT-LVL III: CPT | Mod: PBBFAC,,, | Performed by: PHYSICIAN ASSISTANT

## 2019-01-31 PROCEDURE — 3008F PR BODY MASS INDEX (BMI) DOCUMENTED: ICD-10-PCS | Mod: CPTII,S$GLB,, | Performed by: PHYSICIAN ASSISTANT

## 2019-01-31 PROCEDURE — 99999 PR PBB SHADOW E&M-EST. PATIENT-LVL III: ICD-10-PCS | Mod: PBBFAC,,, | Performed by: PHYSICIAN ASSISTANT

## 2019-01-31 PROCEDURE — 3074F PR MOST RECENT SYSTOLIC BLOOD PRESSURE < 130 MM HG: ICD-10-PCS | Mod: CPTII,S$GLB,, | Performed by: PHYSICIAN ASSISTANT

## 2019-01-31 PROCEDURE — 3079F PR MOST RECENT DIASTOLIC BLOOD PRESSURE 80-89 MM HG: ICD-10-PCS | Mod: CPTII,S$GLB,, | Performed by: PHYSICIAN ASSISTANT

## 2019-01-31 PROCEDURE — 3079F DIAST BP 80-89 MM HG: CPT | Mod: CPTII,S$GLB,, | Performed by: PHYSICIAN ASSISTANT

## 2019-01-31 PROCEDURE — 3008F BODY MASS INDEX DOCD: CPT | Mod: CPTII,S$GLB,, | Performed by: PHYSICIAN ASSISTANT

## 2019-01-31 PROCEDURE — 99214 PR OFFICE/OUTPT VISIT, EST, LEVL IV, 30-39 MIN: ICD-10-PCS | Mod: S$GLB,,, | Performed by: PHYSICIAN ASSISTANT

## 2019-01-31 PROCEDURE — 99214 OFFICE O/P EST MOD 30 MIN: CPT | Mod: S$GLB,,, | Performed by: PHYSICIAN ASSISTANT

## 2019-01-31 RX ORDER — HYDROCORTISONE 25 MG/G
CREAM TOPICAL 2 TIMES DAILY
Qty: 30 G | Refills: 0 | Status: SHIPPED | OUTPATIENT
Start: 2019-01-31 | End: 2019-09-18

## 2019-01-31 NOTE — PROGRESS NOTES
"Subjective:       Patient ID: Juan Pablo Saeed is a 38 y.o. male.    Chief Complaint: Abdominal Pain    HPI     Patient comes in today for issues with stomach     Started on PPI a few weeks ago and noticed that he has a dull ache in stomach at times, mainly after eating.  Also states when he drinks water/eating he feels it "may get stuck"  No coughing   No cp or shortness of breath     Mom passed from GI CA, dx in her 50s     Patient also has known hemorrhoids, declines rectal today but states he thinks his hemorrhoids are back   having BRB streaks in his stool but no pain. No melena           Health Maintenance Due   Topic Date Due    TETANUS VACCINE  09/18/1997    Influenza Vaccine  08/01/2018       Past Medical History:   Diagnosis Date    Back pain     GERD (gastroesophageal reflux disease)     HTN (hypertension)     Obesity        Current Outpatient Medications   Medication Sig Dispense Refill    atorvastatin (LIPITOR) 20 MG tablet Take 1 tablet (20 mg total) by mouth once daily. 30 tablet 11    gabapentin (NEURONTIN) 300 MG capsule Take 1 capsule (300 mg total) by mouth 2 (two) times daily. 60 capsule 11    lisinopril 10 MG tablet Take 1 tablet (10 mg total) by mouth once daily. 30 tablet 11    vitamin E 8.9 unit/g Crea Apply to affected area twice daily 112 g 2    hydrocortisone 2.5 % cream Apply topically 2 (two) times daily. 30 g 0    ranitidine (ZANTAC) 150 MG tablet Take 1 tablet (150 mg total) by mouth 2 (two) times daily. 60 tablet 0    trazodone (DESYREL) 100 MG tablet Take 1 tablet (100 mg total) by mouth every evening. 30 tablet 11     No current facility-administered medications for this visit.        Review of Systems   Constitutional: Negative for fatigue, fever and unexpected weight change.   HENT: Negative for sore throat and trouble swallowing.    Respiratory: Negative for cough and shortness of breath.    Cardiovascular: Negative for chest pain.   Gastrointestinal: Positive for " abdominal pain and blood in stool.   Hematological: Negative for adenopathy. Does not bruise/bleed easily.       Objective:   /82   Pulse 68   Temp 98.6 °F (37 °C) (Tympanic)   Ht 6' (1.829 m)   Wt (!) 171.2 kg (377 lb 6.8 oz)   BMI 51.19 kg/m²      Physical Exam   Constitutional: He is oriented to person, place, and time. He appears well-developed and well-nourished. No distress.   HENT:   Head: Normocephalic and atraumatic.   Right Ear: External ear normal.   Left Ear: External ear normal.   Eyes: EOM are normal. Pupils are equal, round, and reactive to light.   Neck: Normal range of motion. Neck supple.   Cardiovascular: Normal rate and regular rhythm.   Pulmonary/Chest: Effort normal.   Abdominal: Soft. He exhibits no distension and no mass. There is no tenderness. There is no rebound and no guarding.   Musculoskeletal: He exhibits no edema.   Neurological: He is alert and oriented to person, place, and time.   Skin: Capillary refill takes less than 2 seconds.   Psychiatric: He has a normal mood and affect. His behavior is normal.         Lab Results   Component Value Date    WBC 4.97 01/17/2019    HGB 13.6 (L) 01/17/2019    HCT 43.3 01/17/2019     01/17/2019    CHOL 171 01/17/2019    TRIG 209 (H) 01/17/2019    HDL 39 (L) 01/17/2019    ALT 35 01/17/2019    AST 30 01/17/2019     01/17/2019    K 4.1 01/17/2019     01/17/2019    CREATININE 1.7 (H) 01/17/2019    BUN 16 01/17/2019    CO2 28 01/17/2019    TSH 1.940 02/17/2015    HGBA1C 5.8 (H) 01/17/2019       Assessment:       1. Epigastric pain    2. Gastroesophageal reflux disease, esophagitis presence not specified    3. Dysphagia, unspecified type    4. Hemorrhoids, unspecified hemorrhoid type        Plan:   Epigastric pain    Gastroesophageal reflux disease, esophagitis presence not specified  Stop PPI   Start zantac  disucsed will likely need EGD   Patient wants to wait on this   Dysphagia, unspecified type    Hemorrhoids,  unspecified hemorrhoid type  Declined rectal   Start stool softeners, and use cream as needed   Other orders  -     ranitidine (ZANTAC) 150 MG tablet; Take 1 tablet (150 mg total) by mouth 2 (two) times daily.  Dispense: 60 tablet; Refill: 0  -     hydrocortisone 2.5 % cream; Apply topically 2 (two) times daily.  Dispense: 30 g; Refill: 0    3 week follow up GI issues

## 2019-02-01 PROBLEM — K21.9 GERD (GASTROESOPHAGEAL REFLUX DISEASE): Status: ACTIVE | Noted: 2019-02-01

## 2019-02-08 ENCOUNTER — OFFICE VISIT (OUTPATIENT)
Dept: SURGERY | Facility: CLINIC | Age: 39
End: 2019-02-08
Payer: COMMERCIAL

## 2019-02-08 VITALS
DIASTOLIC BLOOD PRESSURE: 70 MMHG | BODY MASS INDEX: 42.66 KG/M2 | HEART RATE: 72 BPM | TEMPERATURE: 99 F | HEIGHT: 72 IN | SYSTOLIC BLOOD PRESSURE: 116 MMHG | WEIGHT: 315 LBS

## 2019-02-08 DIAGNOSIS — E66.01 MORBID OBESITY WITH BMI OF 50.0-59.9, ADULT: Primary | ICD-10-CM

## 2019-02-08 DIAGNOSIS — N18.30 STAGE 3 CHRONIC KIDNEY DISEASE: ICD-10-CM

## 2019-02-08 PROCEDURE — 3008F PR BODY MASS INDEX (BMI) DOCUMENTED: ICD-10-PCS | Mod: CPTII,S$GLB,, | Performed by: SURGERY

## 2019-02-08 PROCEDURE — 99203 PR OFFICE/OUTPT VISIT, NEW, LEVL III, 30-44 MIN: ICD-10-PCS | Mod: S$GLB,,, | Performed by: SURGERY

## 2019-02-08 PROCEDURE — 99203 OFFICE O/P NEW LOW 30 MIN: CPT | Mod: S$GLB,,, | Performed by: SURGERY

## 2019-02-08 PROCEDURE — 3074F PR MOST RECENT SYSTOLIC BLOOD PRESSURE < 130 MM HG: ICD-10-PCS | Mod: CPTII,S$GLB,, | Performed by: SURGERY

## 2019-02-08 PROCEDURE — 99999 PR PBB SHADOW E&M-EST. PATIENT-LVL III: ICD-10-PCS | Mod: PBBFAC,,, | Performed by: SURGERY

## 2019-02-08 PROCEDURE — 3074F SYST BP LT 130 MM HG: CPT | Mod: CPTII,S$GLB,, | Performed by: SURGERY

## 2019-02-08 PROCEDURE — 3008F BODY MASS INDEX DOCD: CPT | Mod: CPTII,S$GLB,, | Performed by: SURGERY

## 2019-02-08 PROCEDURE — 99999 PR PBB SHADOW E&M-EST. PATIENT-LVL III: CPT | Mod: PBBFAC,,, | Performed by: SURGERY

## 2019-02-08 PROCEDURE — 3078F PR MOST RECENT DIASTOLIC BLOOD PRESSURE < 80 MM HG: ICD-10-PCS | Mod: CPTII,S$GLB,, | Performed by: SURGERY

## 2019-02-08 PROCEDURE — 3078F DIAST BP <80 MM HG: CPT | Mod: CPTII,S$GLB,, | Performed by: SURGERY

## 2019-02-08 NOTE — LETTER
February 8, 2019      Jordin Hampton MD  [address]  [address]  [place] [address]           [facility]  [address] [address] [address]  Phone: [phone]  Fax: [phone]          Patient: Juan Pablo Saeed   MR Number: [id number]   YOB: 1980   Date of Visit: 2/8/2019       Dear Dr. Jordin Hampton:    Thank you for referring Juan Pablo Saeed to me for evaluation. Attached you will find relevant portions of my assessment and plan of care.    If you have questions, please do not hesitate to call me. I look forward to following Juan Pablo Saeed along with you.    Sincerely,    Johnny Weinberg MD    Enclosure  CC:  No Recipients    If you would like to receive this communication electronically, please contact [email or web address] or [phone] to request more information on [employer] Link access.    For providers and/or their staff who would like to refer a patient to [facility], please contact us through our one-stop-shop provider referral line, [facility], at [phone].    If you feel you have received this communication in error or would no longer like to receive these types of communications, please e-mail [email or web address]

## 2019-02-08 NOTE — PROGRESS NOTES
Juan Pablo is 38-year-old  male patient who is being seen for the evaluation of severe morbid obesity.  His current BMI is 50.57 kilogram/meter sq and weighs 277 lb.  He has comorbidities such as essential hypertension, stage III to chronic kidney disease, gastroesophageal reflux disease, obstructive sleep apnea and mixed hyperlipidemia.  According to the patient, he had attempted multiple times with dietary regimen and exercise without any success to lose excess weight.  Since his glucose intolerance is worsening over time, he was recommended to undergo were consider weight loss surgery by his primary care provider.  The patient is interested to discuss the treatment algorithm.  The patient and I discussed the weight loss management program including the surgical options.  Patient will be scheduled for dietary consultation and will follow up with me 1 month after.  Patient was encouraged to give up sodas and candies include as well as fatty foods.  Total time approximately half an hour was spent with this patient, and more than 50% of that was spent for treatment planning and counseling.    Johnny Weinberg

## 2019-02-12 ENCOUNTER — TELEPHONE (OUTPATIENT)
Dept: SURGERY | Facility: CLINIC | Age: 39
End: 2019-02-12

## 2019-02-12 NOTE — TELEPHONE ENCOUNTER
Pt returned call. Let pt know that insurance will not cover nutrition consult for 2/21/19. Pt would still like to proceed with consult. Answered all pt questions.

## 2019-02-12 NOTE — TELEPHONE ENCOUNTER
Attempted to contact pt to inform him that nutrition bariatric consult is not covered by insurance. Appt scheduled for 2/21/19. No answer, left voicemail for pt to return call at earliest convenience.

## 2019-02-20 ENCOUNTER — OFFICE VISIT (OUTPATIENT)
Dept: INTERNAL MEDICINE | Facility: CLINIC | Age: 39
End: 2019-02-20
Payer: COMMERCIAL

## 2019-02-20 VITALS
HEIGHT: 72 IN | TEMPERATURE: 99 F | BODY MASS INDEX: 42.66 KG/M2 | DIASTOLIC BLOOD PRESSURE: 88 MMHG | WEIGHT: 315 LBS | SYSTOLIC BLOOD PRESSURE: 124 MMHG | HEART RATE: 72 BPM

## 2019-02-20 DIAGNOSIS — K21.9 GASTROESOPHAGEAL REFLUX DISEASE WITHOUT ESOPHAGITIS: Primary | ICD-10-CM

## 2019-02-20 DIAGNOSIS — E66.01 MORBID OBESITY WITH BMI OF 50.0-59.9, ADULT: ICD-10-CM

## 2019-02-20 DIAGNOSIS — L73.9 FOLLICULITIS: ICD-10-CM

## 2019-02-20 DIAGNOSIS — R10.13 EPIGASTRIC ABDOMINAL PAIN: ICD-10-CM

## 2019-02-20 PROCEDURE — 3074F PR MOST RECENT SYSTOLIC BLOOD PRESSURE < 130 MM HG: ICD-10-PCS | Mod: CPTII,S$GLB,, | Performed by: PHYSICIAN ASSISTANT

## 2019-02-20 PROCEDURE — 3074F SYST BP LT 130 MM HG: CPT | Mod: CPTII,S$GLB,, | Performed by: PHYSICIAN ASSISTANT

## 2019-02-20 PROCEDURE — 99999 PR PBB SHADOW E&M-EST. PATIENT-LVL III: CPT | Mod: PBBFAC,,, | Performed by: PHYSICIAN ASSISTANT

## 2019-02-20 PROCEDURE — 3008F BODY MASS INDEX DOCD: CPT | Mod: CPTII,S$GLB,, | Performed by: PHYSICIAN ASSISTANT

## 2019-02-20 PROCEDURE — 3079F PR MOST RECENT DIASTOLIC BLOOD PRESSURE 80-89 MM HG: ICD-10-PCS | Mod: CPTII,S$GLB,, | Performed by: PHYSICIAN ASSISTANT

## 2019-02-20 PROCEDURE — 99214 PR OFFICE/OUTPT VISIT, EST, LEVL IV, 30-39 MIN: ICD-10-PCS | Mod: S$GLB,,, | Performed by: PHYSICIAN ASSISTANT

## 2019-02-20 PROCEDURE — 3079F DIAST BP 80-89 MM HG: CPT | Mod: CPTII,S$GLB,, | Performed by: PHYSICIAN ASSISTANT

## 2019-02-20 PROCEDURE — 3008F PR BODY MASS INDEX (BMI) DOCUMENTED: ICD-10-PCS | Mod: CPTII,S$GLB,, | Performed by: PHYSICIAN ASSISTANT

## 2019-02-20 PROCEDURE — 99214 OFFICE O/P EST MOD 30 MIN: CPT | Mod: S$GLB,,, | Performed by: PHYSICIAN ASSISTANT

## 2019-02-20 PROCEDURE — 99999 PR PBB SHADOW E&M-EST. PATIENT-LVL III: ICD-10-PCS | Mod: PBBFAC,,, | Performed by: PHYSICIAN ASSISTANT

## 2019-02-20 RX ORDER — SULFAMETHOXAZOLE AND TRIMETHOPRIM 800; 160 MG/1; MG/1
TABLET ORAL
Qty: 14 TABLET | Refills: 0 | Status: SHIPPED | OUTPATIENT
Start: 2019-02-20 | End: 2019-09-18

## 2019-02-20 NOTE — PROGRESS NOTES
Subjective:       Patient ID: Juan Pablo Saeed is a 38 y.o. male.    Chief Complaint: Follow-up    HPI  P[t comes in today for follow up Gerd  Doing much better on H2 blocker     Denies any cp, shortness of breath, abd pain     Normal stools     Has ongoing ingrown hair issue on pelvic region, states he has bymps that sometimes grow into pus filled abscess   Not currently having the issue at this time   Health Maintenance Due   Topic Date Due    TETANUS VACCINE  09/18/1997    Influenza Vaccine  08/01/2018       Past Medical History:   Diagnosis Date    Back pain     GERD (gastroesophageal reflux disease)     HTN (hypertension)     Obesity        Current Outpatient Medications   Medication Sig Dispense Refill    atorvastatin (LIPITOR) 20 MG tablet Take 1 tablet (20 mg total) by mouth once daily. 30 tablet 11    gabapentin (NEURONTIN) 300 MG capsule Take 1 capsule (300 mg total) by mouth 2 (two) times daily. 60 capsule 11    hydrocortisone 2.5 % cream Apply topically 2 (two) times daily. 30 g 0    lisinopril 10 MG tablet Take 1 tablet (10 mg total) by mouth once daily. 30 tablet 11    ranitidine (ZANTAC) 150 MG tablet Take 1 tablet (150 mg total) by mouth 2 (two) times daily. 60 tablet 0    trazodone (DESYREL) 100 MG tablet Take 1 tablet (100 mg total) by mouth every evening. 30 tablet 11    vitamin E 8.9 unit/g Crea Apply to affected area twice daily 112 g 2    sulfamethoxazole-trimethoprim 800-160mg (BACTRIM DS) 800-160 mg Tab Take 1 tablet twice a day for 7 days in setting of bacterial skin infection 14 tablet 0     No current facility-administered medications for this visit.        Review of Systems   Constitutional: Negative for activity change, fatigue, fever and unexpected weight change.   HENT: Negative for trouble swallowing and voice change.    Eyes: Negative for visual disturbance.   Respiratory: Negative for cough and shortness of breath.    Cardiovascular: Negative for chest pain and leg  "swelling.   Gastrointestinal: Negative for abdominal distention, abdominal pain and blood in stool.   Endocrine: Negative for polyuria.   Genitourinary: Negative for difficulty urinating and penile pain.   Musculoskeletal: Negative for arthralgias and back pain.   Skin: Negative for color change and rash.   Allergic/Immunologic: Negative for immunocompromised state.   Neurological: Negative for dizziness and speech difficulty.   Hematological: Negative for adenopathy. Does not bruise/bleed easily.   Psychiatric/Behavioral: Negative for agitation and suicidal ideas.       Objective:   /88   Pulse 72   Temp 99.2 °F (37.3 °C) (Oral)   Ht 6' 0.44" (1.84 m)   Wt (!) 170.5 kg (375 lb 14.2 oz)   BMI 50.36 kg/m²      Physical Exam   Constitutional: He is oriented to person, place, and time. He appears well-developed and well-nourished. No distress.   HENT:   Head: Normocephalic and atraumatic.   Eyes: EOM are normal. Pupils are equal, round, and reactive to light.   Neck: Normal range of motion. Neck supple.   Cardiovascular: Normal rate and regular rhythm.   Pulmonary/Chest: Effort normal.   Abdominal: Soft.   Musculoskeletal: He exhibits no edema.   Neurological: He is alert and oriented to person, place, and time.   Skin: Capillary refill takes less than 2 seconds.   Psychiatric: He has a normal mood and affect. His behavior is normal.         Lab Results   Component Value Date    WBC 4.97 01/17/2019    HGB 13.6 (L) 01/17/2019    HCT 43.3 01/17/2019     01/17/2019    CHOL 171 01/17/2019    TRIG 209 (H) 01/17/2019    HDL 39 (L) 01/17/2019    ALT 35 01/17/2019    AST 30 01/17/2019     01/17/2019    K 4.1 01/17/2019     01/17/2019    CREATININE 1.7 (H) 01/17/2019    BUN 16 01/17/2019    CO2 28 01/17/2019    TSH 1.940 02/17/2015    HGBA1C 5.8 (H) 01/17/2019       Assessment:       1. Gastroesophageal reflux disease without esophagitis    2. Epigastric abdominal pain    3. Folliculitis    4. " Morbid obesity with BMI of 50.0-59.9, adult        Plan:   Gastroesophageal reflux disease without esophagitis  Comments:  much better with H2 blocker than PPI     Epigastric abdominal pain  -     H. pylori antigen, stool; Future; Expected date: 02/20/2019  If pain returns, Patient mentioned he has history of H Pylori, suggest to get stool study. He declined today but will get if pain returns   Folliculitis  Other orders  -     sulfamethoxazole-trimethoprim 800-160mg (BACTRIM DS) 800-160 mg Tab; Take 1 tablet twice a day for 7 days in setting of bacterial skin infection  Dispense: 14 tablet; Refill: 0  Morbid obesity with BMI of 50.0-59.9, adult  Advised strict dietary changes, less simple carbs   High fiber, low calorie diet           No Follow-up on file.

## 2019-02-21 ENCOUNTER — NUTRITION (OUTPATIENT)
Dept: NUTRITION | Facility: CLINIC | Age: 39
End: 2019-02-21
Payer: COMMERCIAL

## 2019-02-21 VITALS — WEIGHT: 315 LBS | HEIGHT: 72 IN | BODY MASS INDEX: 42.66 KG/M2

## 2019-02-21 DIAGNOSIS — N18.30 STAGE 3 CHRONIC KIDNEY DISEASE: Primary | ICD-10-CM

## 2019-02-21 PROCEDURE — 99999 PR PBB SHADOW E&M-EST. PATIENT-LVL II: ICD-10-PCS | Mod: PBBFAC,,,

## 2019-02-21 PROCEDURE — 99999 PR PBB SHADOW E&M-EST. PATIENT-LVL II: CPT | Mod: PBBFAC,,,

## 2019-02-21 PROCEDURE — 97802 MEDICAL NUTRITION INDIV IN: CPT | Mod: S$GLB,,, | Performed by: NUTRITIONIST

## 2019-02-21 PROCEDURE — 97802 PR MED NUTR THER, 1ST, INDIV, EA 15 MIN: ICD-10-PCS | Mod: S$GLB,,, | Performed by: NUTRITIONIST

## 2019-02-21 NOTE — PROGRESS NOTES
"NUTRITIONAL CONSULT    Referring Physician: Dr. Weinberg  Reason for MNT Referral: Initial assessment for sleeve gastrectomy work-up   Plan: Pt will follow up with MD following today's RD consult to reevaluate candidacy for sleeve gastrectomy    PAST MEDICAL HISTORY:   38 y.o. male presents with a BMI of Body mass index is 51.55 kg/m²..  Weight history includes highest weight @ 380 lbs, 355 lbs back in 2009 (lowest weight).  Dieting attempts include exercising, liquid diet, plant based diet, and restricting calories through elimination of sugary foods and decreased intake of sugary beverages e.g. soda    Past Medical History:   Diagnosis Date    Back pain     GERD (gastroesophageal reflux disease)     HTN (hypertension)     Obesity        CLINICAL DATA:  38 y.o.-year-old Black or  male.  Height: 72"  Weight: 380 lbs  IBW: 170 lbs  BMI: 51.55  The patient's goal weight (60 % EBW): 254 lbs  Personal goal weight: 250 - 275 lbs    Goal for Bariatric Surgery: to improve health, to improve quality of life, to lose weight and to prevent future medical conditions     CLINICAL SIGNS/SYMPTOMS: Pt denies N/V/D/C @ time of consult.    NUTRITION & HEALTH HISTORY:2  Greatest challenge: starchy CHO, portion control and high-fat diet    Current diet recall:   Breakfast: sometimes nothing, may have gas station meal or donut shop foods OR banana with oatmeal OR belvita   Snack: strawberries OR belvita OR nut mix (pecans, almonds and cashews) OR slimfast   Lunch: 2 sandwichs (bread, raman, mustard, sargento cheese, black forest ham or sundried tomato turkey from deli) and chips (zapps)  Snack: none   Dinner: spaghetti and meatballs with lean ground beef and angelina tomato sauce with tomato paste with garlic bread with rice and steamed bag of broccoli and cauliflower   Snack: maybe have popcorn (Sweet kettle corn)  Beverages: minute maid fruit punch, water     Current Diet:  Meal pattern: large portions, sugary beverages "   Protein supplements: 1 (slimfast, however higher in sugar than preferred)  Snackin / day  Vegetables: Likes a few. Eats 2-3 times per week.  Fruits: Likes a variety. Eats almost daily.  Beverages: water and sugary beverages  Dining out: Daily. Mostly fast food, take-out and gas station entrees.  Cooking at home: Daily. Mostly baked, grilled and smothered meat, fish, starchy CHO and vegetables.    Exercise:  Past exercise: Adequate    Current exercise: Adequate - Pt works unloading boats all day by the [employer]     Restrictions to exercise: None     Labs:  (19) HgbA1c 5.8 H,  H, HDL 39 L, Creat 1.7 H, GFR 57.4/49.7 L, Hgb 13.6 L     Vitamins / Minerals / Herbs: Vitamin E     Food and Medication Interactions: Reviewed.  Lipitor: decrease fat and cholesterol, caution with grapefruit and related citrus    Food Allergies:   NKFA per pt     Social:  Works regular daytime shifts.  Lives with wife.  Grocery shopping and food prep self and wife.  Patient believes the household will be supportive after surgery.  Alcohol: Socially.  Smoking: None.    ASSESSMENT:  · Patient reports attempts at weight loss, only to regain lost weight.  · Patient demonstrated knowledge of healthy eating behaviors and exercise patterns; admits to not eating healthy and not exercising at this point.  · Patient demonstrates willingness to change lifestyle and make behavior modifications (by already cutting back on specific sugary foods and beverages)  · Expect good  compliance after surgery at this time.    PES:  Overweight/Obesity related to excessive energy intake as evidenced by BMI of 51.55 (obesity class III), increased body adiposity, estimated excessive energy intake e.g. pt reports of frequent intake of fruit punch and large portions of food    BARIATRIC DIET DISCUSSION:  Reviewed pt current diet choices and provided alternatives and suggestions where needed.  Made a list of things for pt to work on, suggested pt  pick a few to start with and add more on as able for prolonged success.  Reviewed portion sizes with pt using food models.  Reviewed salty foods for pt to avoid, discussed sodium recommendations (pt with PMHx of CKD) only given most recent lab values.  Discussed diet after surgery and related to patients food record.  Reviewed diet progression before and after surgery.  Answered all questions.    RECOMMENDATIONS:  Patient is a potential candidate for bariatric surgery.    2200 calories daily.  Less than 2300 mg of sodium daily.  Small frequent meals, eat every 3-4 hours.    Review bariatric nutrition booklet and call with any questions.    Needs additional visit(s) with RD.    PLAN:  Will follow up with pt 2-4 weeks post op if surgical route is decided   Will follow up with pt PRN for general weight loss consulting     SESSION TIME:  60 minutes

## 2019-03-15 DIAGNOSIS — I10 ESSENTIAL HYPERTENSION: ICD-10-CM

## 2019-03-15 RX ORDER — LISINOPRIL 10 MG/1
TABLET ORAL
Qty: 30 TABLET | Refills: 11 | Status: SHIPPED | OUTPATIENT
Start: 2019-03-15 | End: 2019-03-18 | Stop reason: SDUPTHER

## 2019-03-18 DIAGNOSIS — I10 ESSENTIAL HYPERTENSION: ICD-10-CM

## 2019-03-18 RX ORDER — LISINOPRIL 10 MG/1
10 TABLET ORAL DAILY
Qty: 30 TABLET | Refills: 11 | Status: SHIPPED | OUTPATIENT
Start: 2019-03-18 | End: 2020-02-27 | Stop reason: SDUPTHER

## 2019-08-30 ENCOUNTER — PATIENT MESSAGE (OUTPATIENT)
Dept: INTERNAL MEDICINE | Facility: CLINIC | Age: 39
End: 2019-08-30

## 2019-09-18 ENCOUNTER — OFFICE VISIT (OUTPATIENT)
Dept: NEPHROLOGY | Facility: CLINIC | Age: 39
End: 2019-09-18
Payer: COMMERCIAL

## 2019-09-18 VITALS
HEART RATE: 60 BPM | SYSTOLIC BLOOD PRESSURE: 120 MMHG | WEIGHT: 315 LBS | DIASTOLIC BLOOD PRESSURE: 88 MMHG | HEIGHT: 72 IN | BODY MASS INDEX: 42.66 KG/M2 | RESPIRATION RATE: 20 BRPM

## 2019-09-18 DIAGNOSIS — N18.30 CKD (CHRONIC KIDNEY DISEASE) STAGE 3, GFR 30-59 ML/MIN: Primary | ICD-10-CM

## 2019-09-18 PROCEDURE — 3079F DIAST BP 80-89 MM HG: CPT | Mod: CPTII,S$GLB,, | Performed by: INTERNAL MEDICINE

## 2019-09-18 PROCEDURE — 3008F PR BODY MASS INDEX (BMI) DOCUMENTED: ICD-10-PCS | Mod: CPTII,S$GLB,, | Performed by: INTERNAL MEDICINE

## 2019-09-18 PROCEDURE — 3079F PR MOST RECENT DIASTOLIC BLOOD PRESSURE 80-89 MM HG: ICD-10-PCS | Mod: CPTII,S$GLB,, | Performed by: INTERNAL MEDICINE

## 2019-09-18 PROCEDURE — 99999 PR PBB SHADOW E&M-EST. PATIENT-LVL III: CPT | Mod: PBBFAC,,, | Performed by: INTERNAL MEDICINE

## 2019-09-18 PROCEDURE — 3074F PR MOST RECENT SYSTOLIC BLOOD PRESSURE < 130 MM HG: ICD-10-PCS | Mod: CPTII,S$GLB,, | Performed by: INTERNAL MEDICINE

## 2019-09-18 PROCEDURE — 3074F SYST BP LT 130 MM HG: CPT | Mod: CPTII,S$GLB,, | Performed by: INTERNAL MEDICINE

## 2019-09-18 PROCEDURE — 3008F BODY MASS INDEX DOCD: CPT | Mod: CPTII,S$GLB,, | Performed by: INTERNAL MEDICINE

## 2019-09-18 PROCEDURE — 99999 PR PBB SHADOW E&M-EST. PATIENT-LVL III: ICD-10-PCS | Mod: PBBFAC,,, | Performed by: INTERNAL MEDICINE

## 2019-09-18 PROCEDURE — 99214 PR OFFICE/OUTPT VISIT, EST, LEVL IV, 30-39 MIN: ICD-10-PCS | Mod: S$GLB,,, | Performed by: INTERNAL MEDICINE

## 2019-09-18 PROCEDURE — 99214 OFFICE O/P EST MOD 30 MIN: CPT | Mod: S$GLB,,, | Performed by: INTERNAL MEDICINE

## 2019-09-18 NOTE — PROGRESS NOTES
PROGRESS NOTE FOR ESTABLISHED PATIENT    PHYSICIAN REQUESTING THE CONSULT: Dr. Jordin Hampton    REASON FOR VISIT: Renal insufficiency/proteinuria    39 y.o. male with history of GERD, HTN, PUD presents to the renal clinic for evaluation of renal insufficiency and proteinuria.    February 1, 2018:  Patient reports left ear pain/dryness.     September 18, 2019:  Patient reports mild intermittent hand pain. No other issues.         Past Medical History:   Diagnosis Date    Back pain     GERD (gastroesophageal reflux disease)     HTN (hypertension)     Obesity        Past Surgical History:   Procedure Laterality Date    CIRCUMCISION  2010       Review of patient's allergies indicates:  No Known Allergies    Current Outpatient Medications   Medication Sig Dispense Refill    atorvastatin (LIPITOR) 20 MG tablet Take 1 tablet (20 mg total) by mouth once daily. 30 tablet 11    gabapentin (NEURONTIN) 300 MG capsule Take 1 capsule (300 mg total) by mouth 2 (two) times daily. 60 capsule 11    lisinopril 10 MG tablet Take 1 tablet (10 mg total) by mouth once daily. 30 tablet 11    ranitidine (ZANTAC) 150 MG tablet Take 1 tablet (150 mg total) by mouth 2 (two) times daily. 60 tablet 0    trazodone (DESYREL) 100 MG tablet Take 1 tablet (100 mg total) by mouth every evening. 30 tablet 11    vitamin E 8.9 unit/g Crea Apply to affected area twice daily 112 g 2     No current facility-administered medications for this visit.        Family History   Problem Relation Age of Onset    Arthritis Mother     Cancer Mother         stomach    Diabetes Brother     Alcohol abuse Brother     Stroke Maternal Grandmother        Social History     Socioeconomic History    Marital status:      Spouse name: Not on file    Number of children: 0    Years of education: Not on file    Highest education level: Not on file   Occupational History    Occupation: [employer]   Social Needs    Financial resource strain: Not on file     Food insecurity:     Worry: Not on file     Inability: Not on file    Transportation needs:     Medical: Not on file     Non-medical: Not on file   Tobacco Use    Smoking status: Never Smoker    Smokeless tobacco: Never Used   Substance and Sexual Activity    Alcohol use: Yes    Drug use: No    Sexual activity: Yes   Lifestyle    Physical activity:     Days per week: Not on file     Minutes per session: Not on file    Stress: Not on file   Relationships    Social connections:     Talks on phone: Not on file     Gets together: Not on file     Attends Evangelical service: Not on file     Active member of club or organization: Not on file     Attends meetings of clubs or organizations: Not on file     Relationship status: Not on file   Other Topics Concern    Not on file   Social History Narrative    Not on file       Review of Systems:  1. GENERAL: patient denies any fever, weight changes, generalized weakness, dizziness.  2. HEENT: patient denies headaches, visual disturbances, swallowing problems, sinus pain, nasal congestion  3. CARDIOVASCULAR: patient denies chest pain, palpitations.  4. PULMONARY: patient denies SOB, coughing, hemoptysis, wheezing.  5. GASTROINTESTINAL: patient denies abdominal pain, nausea, vomiting, diarrhea.  6. GENITOURINARY: patient denies dysuria, hematuria, hesitancy, frequency.  7. EXTREMITIES: patient denies LE edema, LE cramping, he reports intermittent hand pain  8. DERMATOLOGY: patient denies rashes, ulcers.  9. NEURO: patient denies tremors, extremity weakness, extremity numbness/tingling.  10. MUSCULOSKELETAL: patient denies joint pain, joint swelling.  11. HEMATOLOGY: patient denies rectal or gum bleeding.  12: PSYCH: patient denies anxiety, depression.      PHYSICAL EXAM:  /88   Pulse 60   Resp 20   Ht 6' (1.829 m)   Wt (!) 170.1 kg (375 lb)   BMI 50.86 kg/m²     GENERAL: Pleasant overweight gentleman presents to clinic with non-labored breathing.  HEENT:  PER, no nasal discharge, no icterus, no oral exudates, moist mucosal membranes.  NECK: no thyroid mass, no lymphadenopathy.  HEART: RRR S1/S2, no rubs, good peripheral pulses.  LUNGS: CTA bilaterally, no wheezing, breathing is nonlabored.  ABDOMEN: soft, nontender, not distended, bowel sounds are present, no abdominal hernia.  EXTREM: no LE edema.  SKIN: no rashes, skin is warm and dry.  NEURO: A & O x 3, no obvious focal signs.    LABORATORY RESULTS:    Lab Results   Component Value Date    CREATININE 1.7 (H) 01/17/2019    BUN 16 01/17/2019     01/17/2019    K 4.1 01/17/2019     01/17/2019    CO2 28 01/17/2019      Lab Results   Component Value Date    PTH 49.0 05/16/2017    CALCIUM 10.4 01/17/2019    PHOS 2.9 01/25/2018     Lab Results   Component Value Date    ALBUMIN 4.0 01/17/2019     Lab Results   Component Value Date    WBC 4.97 01/17/2019    HGB 13.6 (L) 01/17/2019    HCT 43.3 01/17/2019    MCV 81 (L) 01/17/2019     01/17/2019     Protein Creatinine Ratios:    Creatinine, Random Ur   Date Value Ref Range Status   01/25/2018 124.0 23.0 - 375.0 mg/dL Final     Comment:     The random urine reference ranges provided were established   for 24 hour urine collections.  No reference ranges exist for  random urine specimens.  Correlate clinically.     05/16/2017 119.0 23.0 - 375.0 mg/dL Final     Comment:     The random urine reference ranges provided were established   for 24 hour urine collections.  No reference ranges exist for  random urine specimens.  Correlate clinically.     08/22/2016 170.0 23.0 - 375.0 mg/dL Final     Comment:     The random urine reference ranges provided were established   for 24 hour urine collections.  No reference ranges exist for  random urine specimens.  Correlate clinically.       Protein, Urine   Date Value Ref Range Status   03/19/2015 111 (H) 0 - 15 mg/dL Final   02/26/2015 113 (H) 0 - 15 mg/dL Final     Protein, Urine Random   Date Value Ref Range Status    01/25/2018 10 0 - 15 mg/dL Final     Comment:     The random urine reference ranges provided were established   for 24 hour urine collections.  No reference ranges exist for  random urine specimens.  Correlate clinically.     05/16/2017 21 (H) 0 - 15 mg/dL Final     Comment:     The random urine reference ranges provided were established   for 24 hour urine collections.  No reference ranges exist for  random urine specimens.  Correlate clinically.     08/22/2016 58 (H) 0 - 15 mg/dL Final     Comment:     The random urine reference ranges provided were established   for 24 hour urine collections.  No reference ranges exist for  random urine specimens.  Correlate clinically.       Prot/Creat Ratio, Ur   Date Value Ref Range Status   01/25/2018 0.08 0.00 - 0.20 Final   05/16/2017 0.18 0.00 - 0.20 Final   08/22/2016 0.34 (H) 0.00 - 0.20 Final       24 hour urinary protein: 1.8 g (3/19/15).       ASSESSMENT AND PLAN:  39 y.o. male with history of GERD, HTN, PUD presents to the renal clinic for evaluation of renal insufficiency and proteinuria.    1. Renal insufficiency/proteinuria: Patient presents with mild renal insufficiency, consistent with CKD stage 3. Last creatinine was 1.7. Last protein creatinine ratio from 1/25/18 was 0.08. Patient is on lisinopril which will be continued. Serologies and HgA1c negative/within normal limits. He will return to the clinic in 6 months for follow up. Patient was advised to avoid NSAID pain medications such as advil, aleve, motrin, ibuprofen, naprosyn, meloxicam, diclofenac, mobic.     2. Electrolytes: At goal.     3. Acid base status: No acute issues.      4. Volume: Euvolemic.    5. Hypertension: Good BP control.     6. Medications: Reviewed. Agree with current medical regimen. Patient was advised to avoid NSAID pain medications such as advil, aleve, motrin, ibuprofen, naprosyn, meloxicam, diclofenac, mobic. Continue Lisinopril.     7. 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

## 2019-09-19 ENCOUNTER — OFFICE VISIT (OUTPATIENT)
Dept: INTERNAL MEDICINE | Facility: CLINIC | Age: 39
End: 2019-09-19
Payer: COMMERCIAL

## 2019-09-19 VITALS
HEIGHT: 72 IN | BODY MASS INDEX: 42.66 KG/M2 | TEMPERATURE: 98 F | SYSTOLIC BLOOD PRESSURE: 128 MMHG | DIASTOLIC BLOOD PRESSURE: 84 MMHG | WEIGHT: 315 LBS | HEART RATE: 80 BPM

## 2019-09-19 DIAGNOSIS — G60.9 IDIOPATHIC PERIPHERAL NEUROPATHY: Primary | ICD-10-CM

## 2019-09-19 PROCEDURE — 3074F PR MOST RECENT SYSTOLIC BLOOD PRESSURE < 130 MM HG: ICD-10-PCS | Mod: CPTII,S$GLB,, | Performed by: PHYSICIAN ASSISTANT

## 2019-09-19 PROCEDURE — 3008F BODY MASS INDEX DOCD: CPT | Mod: CPTII,S$GLB,, | Performed by: PHYSICIAN ASSISTANT

## 2019-09-19 PROCEDURE — 3008F PR BODY MASS INDEX (BMI) DOCUMENTED: ICD-10-PCS | Mod: CPTII,S$GLB,, | Performed by: PHYSICIAN ASSISTANT

## 2019-09-19 PROCEDURE — 3074F SYST BP LT 130 MM HG: CPT | Mod: CPTII,S$GLB,, | Performed by: PHYSICIAN ASSISTANT

## 2019-09-19 PROCEDURE — 99999 PR PBB SHADOW E&M-EST. PATIENT-LVL III: CPT | Mod: PBBFAC,,, | Performed by: PHYSICIAN ASSISTANT

## 2019-09-19 PROCEDURE — 99999 PR PBB SHADOW E&M-EST. PATIENT-LVL III: ICD-10-PCS | Mod: PBBFAC,,, | Performed by: PHYSICIAN ASSISTANT

## 2019-09-19 PROCEDURE — 99213 OFFICE O/P EST LOW 20 MIN: CPT | Mod: S$GLB,,, | Performed by: PHYSICIAN ASSISTANT

## 2019-09-19 PROCEDURE — 3079F PR MOST RECENT DIASTOLIC BLOOD PRESSURE 80-89 MM HG: ICD-10-PCS | Mod: CPTII,S$GLB,, | Performed by: PHYSICIAN ASSISTANT

## 2019-09-19 PROCEDURE — 3079F DIAST BP 80-89 MM HG: CPT | Mod: CPTII,S$GLB,, | Performed by: PHYSICIAN ASSISTANT

## 2019-09-19 PROCEDURE — 99213 PR OFFICE/OUTPT VISIT, EST, LEVL III, 20-29 MIN: ICD-10-PCS | Mod: S$GLB,,, | Performed by: PHYSICIAN ASSISTANT

## 2019-09-19 RX ORDER — GABAPENTIN 300 MG/1
300 CAPSULE ORAL NIGHTLY
Qty: 30 CAPSULE | Refills: 11 | Status: SHIPPED | OUTPATIENT
Start: 2019-09-19 | End: 2020-02-03

## 2019-09-19 NOTE — PROGRESS NOTES
Subjective:       Patient ID: Juan Pablo Saeed is a 39 y.o. male.    Chief Complaint:   HPI        Patient comes in today for gabapentin update.     He is starting a new job     He is a  trainee     He has peripheral neuropathy that has greatly improved, doing better with diet/exercise.     He is ok to take this medication only before bedtime     It however does not affect him during his work hours, per his old job.  Since he is starting new job, they need more information about this medication.     He is overall doing well without issues.     Health Maintenance Due   Topic Date Due    TETANUS VACCINE  09/18/1997       Past Medical History:   Diagnosis Date    Back pain     GERD (gastroesophageal reflux disease)     HTN (hypertension)     Obesity        Current Outpatient Medications   Medication Sig Dispense Refill    atorvastatin (LIPITOR) 20 MG tablet Take 1 tablet (20 mg total) by mouth once daily. 30 tablet 11    lisinopril 10 MG tablet Take 1 tablet (10 mg total) by mouth once daily. 30 tablet 11    multivitamin with minerals tablet Take 1 tablet by mouth.      ranitidine (ZANTAC) 150 MG tablet Take 1 tablet (150 mg total) by mouth 2 (two) times daily. 60 tablet 0    trazodone (DESYREL) 100 MG tablet Take 1 tablet (100 mg total) by mouth every evening. 30 tablet 11    vitamin E 8.9 unit/g Crea Apply to affected area twice daily 112 g 2    gabapentin (NEURONTIN) 300 MG capsule Take 1 capsule (300 mg total) by mouth every evening. 30 capsule 11     No current facility-administered medications for this visit.        Review of Systems   Constitutional: Negative for fatigue, fever and unexpected weight change.   HENT: Negative for sore throat and trouble swallowing.    Respiratory: Negative for cough and shortness of breath.    Cardiovascular: Negative for chest pain.   Gastrointestinal: Negative for abdominal pain.   Skin: Negative for rash and wound.   Neurological: Positive for  numbness (improvement over time ).   Hematological: Negative for adenopathy. Does not bruise/bleed easily.   All other systems reviewed and are negative.      Objective:   /84   Pulse 80   Temp 98.4 °F (36.9 °C) (Oral)   Ht 6' (1.829 m)   Wt (!) 169.7 kg (374 lb 1.9 oz)   BMI 50.74 kg/m²      Physical Exam   Constitutional: He is oriented to person, place, and time. He appears well-developed and well-nourished. No distress.   HENT:   Head: Normocephalic and atraumatic.   Eyes: Pupils are equal, round, and reactive to light. EOM are normal.   Neck: Normal range of motion. Neck supple.   Cardiovascular: Normal rate and regular rhythm.   Pulmonary/Chest: Effort normal.   Abdominal: Soft.   Musculoskeletal: He exhibits no edema.   Neurological: He is alert and oriented to person, place, and time.   Skin: Capillary refill takes less than 2 seconds.   Psychiatric: He has a normal mood and affect. His behavior is normal.         Lab Results   Component Value Date    WBC 4.97 01/17/2019    HGB 13.6 (L) 01/17/2019    HCT 43.3 01/17/2019     01/17/2019    CHOL 171 01/17/2019    TRIG 209 (H) 01/17/2019    HDL 39 (L) 01/17/2019    ALT 35 01/17/2019    AST 30 01/17/2019     01/17/2019    K 4.1 01/17/2019     01/17/2019    CREATININE 1.7 (H) 01/17/2019    BUN 16 01/17/2019    CO2 28 01/17/2019    TSH 1.940 02/17/2015    HGBA1C 5.8 (H) 01/17/2019       Assessment:       1. Idiopathic peripheral neuropathy        Plan:   Idiopathic peripheral neuropathy    Other orders  -     gabapentin (NEURONTIN) 300 MG capsule; Take 1 capsule (300 mg total) by mouth every evening.  Dispense: 30 capsule; Refill: 11    doing well with gabapentin   New job needs more info    He has no issues with how he is taking it but states with wt loss better, diet, his issue is controlled  He is ok to take it before bed and not before work     Updated rx in chart       No follow-ups on file.

## 2019-09-26 ENCOUNTER — PATIENT MESSAGE (OUTPATIENT)
Dept: INTERNAL MEDICINE | Facility: CLINIC | Age: 39
End: 2019-09-26

## 2019-09-26 DIAGNOSIS — G47.33 OSA (OBSTRUCTIVE SLEEP APNEA): Primary | ICD-10-CM

## 2019-10-11 ENCOUNTER — PATIENT MESSAGE (OUTPATIENT)
Dept: INTERNAL MEDICINE | Facility: CLINIC | Age: 39
End: 2019-10-11

## 2019-10-11 RX ORDER — HYDROCORTISONE ACETATE 25 MG/1
SUPPOSITORY RECTAL
COMMUNITY
Start: 2019-09-09 | End: 2019-10-11 | Stop reason: SDUPTHER

## 2019-10-11 RX ORDER — HYDROCORTISONE ACETATE 25 MG/1
25 SUPPOSITORY RECTAL 2 TIMES DAILY PRN
Qty: 30 SUPPOSITORY | Refills: 0 | Status: SHIPPED | OUTPATIENT
Start: 2019-10-11 | End: 2020-11-30 | Stop reason: SDUPTHER

## 2019-10-30 ENCOUNTER — OFFICE VISIT (OUTPATIENT)
Dept: PULMONOLOGY | Facility: CLINIC | Age: 39
End: 2019-10-30
Payer: COMMERCIAL

## 2019-10-30 VITALS
HEIGHT: 72 IN | DIASTOLIC BLOOD PRESSURE: 80 MMHG | WEIGHT: 315 LBS | BODY MASS INDEX: 42.66 KG/M2 | SYSTOLIC BLOOD PRESSURE: 122 MMHG | OXYGEN SATURATION: 99 % | RESPIRATION RATE: 18 BRPM | HEART RATE: 70 BPM

## 2019-10-30 DIAGNOSIS — G47.33 OSA (OBSTRUCTIVE SLEEP APNEA): Primary | ICD-10-CM

## 2019-10-30 DIAGNOSIS — F51.12 BEHAVIORALLY INDUCED INSUFFICIENT SLEEP SYNDROME: ICD-10-CM

## 2019-10-30 DIAGNOSIS — F51.04 PSYCHOPHYSIOLOGICAL INSOMNIA: ICD-10-CM

## 2019-10-30 PROCEDURE — 99215 PR OFFICE/OUTPT VISIT, EST, LEVL V, 40-54 MIN: ICD-10-PCS | Mod: S$GLB,,, | Performed by: INTERNAL MEDICINE

## 2019-10-30 PROCEDURE — 99215 OFFICE O/P EST HI 40 MIN: CPT | Mod: S$GLB,,, | Performed by: INTERNAL MEDICINE

## 2019-10-30 PROCEDURE — 3074F SYST BP LT 130 MM HG: CPT | Mod: CPTII,S$GLB,, | Performed by: INTERNAL MEDICINE

## 2019-10-30 PROCEDURE — 3008F PR BODY MASS INDEX (BMI) DOCUMENTED: ICD-10-PCS | Mod: CPTII,S$GLB,, | Performed by: INTERNAL MEDICINE

## 2019-10-30 PROCEDURE — 3008F BODY MASS INDEX DOCD: CPT | Mod: CPTII,S$GLB,, | Performed by: INTERNAL MEDICINE

## 2019-10-30 PROCEDURE — 3079F PR MOST RECENT DIASTOLIC BLOOD PRESSURE 80-89 MM HG: ICD-10-PCS | Mod: CPTII,S$GLB,, | Performed by: INTERNAL MEDICINE

## 2019-10-30 PROCEDURE — 99999 PR PBB SHADOW E&M-EST. PATIENT-LVL IV: ICD-10-PCS | Mod: PBBFAC,,, | Performed by: INTERNAL MEDICINE

## 2019-10-30 PROCEDURE — 3074F PR MOST RECENT SYSTOLIC BLOOD PRESSURE < 130 MM HG: ICD-10-PCS | Mod: CPTII,S$GLB,, | Performed by: INTERNAL MEDICINE

## 2019-10-30 PROCEDURE — 3079F DIAST BP 80-89 MM HG: CPT | Mod: CPTII,S$GLB,, | Performed by: INTERNAL MEDICINE

## 2019-10-30 PROCEDURE — 99999 PR PBB SHADOW E&M-EST. PATIENT-LVL IV: CPT | Mod: PBBFAC,,, | Performed by: INTERNAL MEDICINE

## 2019-10-30 RX ORDER — TRAZODONE HYDROCHLORIDE 100 MG/1
100 TABLET ORAL NIGHTLY
Qty: 30 TABLET | Refills: 11 | Status: SHIPPED | OUTPATIENT
Start: 2019-10-30 | End: 2022-04-18

## 2019-10-30 NOTE — LETTER
October 30, 2019      Jordin Hampton MD  [address] [facility]  [address]  [place] [address]           [facility]  [address] [place] [address]  Phone: [phone]  Fax: [phone]          Patient: Juan Pablo Saeed   MR Number: [id number]   YOB: 1980   Date of Visit: 10/30/2019       Dear Dr. Jordin Hampton:    Thank you for referring Juan Pablo Saeed to me for evaluation. Attached you will find relevant portions of my assessment and plan of care.    If you have questions, please do not hesitate to call me. I look forward to following Juan Pablo Saeed along with you.    Sincerely,    Vernon Beck MD    Enclosure  CC:  No Recipients    If you would like to receive this communication electronically, please contact [email or web address] or [phone] to request more information on [employer] Link access.    For providers and/or their staff who would like to refer a patient to [facility], please contact us through our one-stop-shop provider referral line, [facility], at [phone].    If you feel you have received this communication in error or would no longer like to receive these types of communications, please e-mail [email or web address]

## 2019-10-30 NOTE — PROGRESS NOTES
Subjective:       Patient ID: Juan Pablo Saeed is a 39 y.o. male.    Chief Complaint: He       Sleeping Problem    HPI   Sleep Apnea  He presents for a sleep evaluation. He complains of snoring, snorting, periods of not breathing, excessive daytime sleepiness, falling asleep while reading, watching television, feels sleepy during the day, take naps during the day.  Symptoms began 3 years ago, gradually worsening since that time.  He goes to sleep at 11-12 weekdays and 12 weekends. He awakens 4 weekdays and 8 weekends. He falls asleep in 60 minutes.  Collar size na. He denies knees buckling with laughing, completely or partially paralyzed while falling asleep or waking up. Previous evaluation and treatment has included PSG.    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    Past Medical History:   Diagnosis Date    Back pain     GERD (gastroesophageal reflux disease)     HTN (hypertension)     Obesity      Past Surgical History:   Procedure Laterality Date    CIRCUMCISION  2010     Social History     Socioeconomic History    Marital status:      Spouse name: Not on file    Number of children: 0    Years of education: Not on file    Highest education level: Not on file   Occupational History    Occupation: [employer]   Social [place]    Financial resource strain: Not on file    Food insecurity:     Worry: Not on file     Inability: Not on file    Transportation needs:     Medical: Not on file     Non-medical: Not on file   Tobacco Use    Smoking status: Never Smoker    Smokeless tobacco: Never Used   Substance and Sexual Activity    Alcohol use: Yes    Drug use: No    Sexual activity: Yes   Lifestyle    Physical activity:     Days per week: Not on file     Minutes per session: Not on file    Stress: Not on file   Relationships    Social connections:     Talks on phone: Not on file     Gets together: Not on file     Attends Alevism service: Not on  file     Active member of club or organization: Not on file     Attends meetings of clubs or organizations: Not on file     Relationship status: Not on file   Other Topics Concern    Not on file   Social History Narrative    Not on file     Review of Systems   Constitutional: Positive for fatigue.   HENT: Negative.    Respiratory: Positive for apnea.    Cardiovascular: Negative.    Genitourinary: Negative.    Endocrine: endocrine negative   Musculoskeletal: Negative.    Skin: Negative.    Gastrointestinal: Negative.    Neurological: Negative.    Psychiatric/Behavioral: Positive for sleep disturbance.       Objective:      /80   Pulse 70   Resp 18   Ht 6' (1.829 m)   Wt (!) 165 kg (363 lb 12.1 oz)   SpO2 99%   BMI 49.33 kg/m²   Physical Exam   Constitutional: He is oriented to person, place, and time. He appears well-developed and well-nourished.   HENT:   Head: Normocephalic and atraumatic.   Eyes: Pupils are equal, round, and reactive to light. Conjunctivae are normal.   Neck: Neck supple. No JVD present. No tracheal deviation present. No thyromegaly present.   Cardiovascular: Normal rate, regular rhythm and normal heart sounds.   Pulmonary/Chest: Effort normal and breath sounds normal.   Abdominal: Soft.   Musculoskeletal: Normal range of motion.   Lymphadenopathy:     He has no cervical adenopathy.   Neurological: He is alert and oriented to person, place, and time.   Skin: Skin is warm and dry.   Nursing note and vitals reviewed.    Personal Diagnostic Review  Polysomnogram: Obstructive Sleep Apnea     [facility]  Sleep Disorder Center  DIAGNOST IC POLSOMNOGRAPHY REPORT  Patient Name: Juan Pablo Saeed Subject Code: [id number] Study Date: 2017  Page 1  Patient Name: Juan Pablo Saeed Date of Report: 17 Date of PS2017  [facility] No.: [id number] : 1980 Time of PSG: 10:15:31 PM - 4:34:09 AM  Sex: Male Age: 36 Weight: 364.0 lbs Height: 6 ? 0 ? Type of PSG:  Diagnostic  REASONS FOR REFERRAL: Mr. Saeed is a 36 year old male, referred to Elizabeth LeJeune, APRN, and the SD by Dr. Jordin Hampton for evaluation of possible obstructive sleep apnea / hypopnea syndrome (OSAHS). Ms LeJeune requested diagnostic  polysomnography based on the patient?s reported snoring, observed respiratory pauses in sleep (STOP-BANG), frequent  nocturnal awakenings, unrefreshing sleep and daytime hypersomnolence. His [place] Sleepiness Scale score was 13,  clinically significant, and his STOP - BANG score was 7, high risk of MOSHE. Dr. Hampton is the patient?s primary care physician.  STUDY PARAMETERS: This diagnostic study involved analysis of the patient's sleep pattern while breathing unassisted. The  study was performed with a sleep technologist in attendance for the entire test period, with video monitoring throughout the  study, and routine laboratory clinical parameters recorded: NOTE: The polysomnography electrophysiological record for the  patient has been reviewed in its entirety by Dr. Fink.  SUMMARY STATEMENTS  DIAGNOSTIC IMPRESSIONS  G47.33 / 327.23 Moderate Obstructive Sleep Apnea, Adult (OSAHS)  F51.04 / 307.42 Psychophysiological Insomnia (stress - related and / or conditioned)  G47.26 / 327.36 Shift Work Disorder  G47.63 / 327.53 Sleep Related Bruxism  F51.12 / 307.44 Insufficient Sleep Syndrome (secondary to shift work disorder)  Z72.821 / V69.4 Inadequate Sleep Hygiene  G25.81 / 333.99 r/o Restless Legs Syndrome (RLS)  G47.21 / 327.31 r/o Delayed Sleep - Wake Phase Disorder  PRIMARY TREATMENT RECOMMENDATIONS Treat, or refer to Sleep Disorders Center.  1. The diagnostic polysomnography revealed a moderate obstructive sleep apnea / hypopnea syndrome (A + H Index = 23.0  events / hr asleep with 11.0 respiratory event - related arousals / hr asleep for the study, and no RERAs (respiratory effort -  related arousals). The mean SpO2 value was 95.1 %, mild, minimum oxygen saturation  during sleep was 84.0 %, and  waking baseline SpO2 was 98 %. Sporadic, moderately loud snoring was noted. A CPAP titration polysomnography is  recommended.  2. As respiratory events had a strong supine positional tendency, a device to discourage sleep in the supine position is  recommended to reduce respiratory events and snoring.  3. Weight loss to the normal range is recommended as it can decrease respiratory events and snoring in overweight patients.  4. Attempt to obtain a daytime work schedule that permits a stable sleep / wake cycle. Otherwise, the following behavioral  adjustments can improve sleep if shift changes are regular and predictable:   Go to sleep only after having been awake for at least 16 hrs; schedule an 8 hr continuous sleep period.   Best to wake up, have breakfast and then go to work; try to eat 3 meals a day, at regular intervals, about 4 - 5 hrs apart   Best to remain awake for 4 - 5 hrs after a work shift (including dinner time) before sleep.   Be sure to do relaxing / enjoyable activities the hour before bedtime.   Better to lose some sleep than to try to sleep without having enough prior time awake (16 hrs); avoid naps.   Consider maintaining the same sleep / wake cycle on days off as on work days.   At bedtime, turn clock around (to avoid worry when you awaken during the sleep period).   Use masking sounds and dark curtains for daytime sleep and nighttime sleep as well.  [facility]  Sleep Disorder Center  DIAGNOST IC POLSOMNOGRAPHY REPORT  Patient Name: Juan Pablo Saeed Subject Code: [id number] Study Date: 5/2/2017  Page 2   Avoid eating large meals within 3 hrs, and caffeinated beverages within 6 hrs, of bedtime (try using decaf versions).   Avoid strenuous exercise within 2 hrs of bedtime.  Please see SDI Maladaptive Sleep - Related Behavior, below.  SECONDARY TREATMENT RECOMMENDATIONS Treat, or refer to SDC if problems are not satisfactorily resolved by  the above.  1. If insomnia persists after treatments for medical sleep disorders have proven effective, and RLS has been ruled out or  effectively treated, recommend follow - up inquiry regarding frequency, duration and nature of reported sleep loss, delayed  sleep onset, poor sleep maintenance and involuntary early awakening (stress - related and / or conditioned  psychophysiological insomnia, r/o delayed sleep phase) and referral for behavioral treatment of insomnia, as indicated.  Please see SDI.  2. Consider a referral for a dental examination and possible dental splint for sleep bruxism.  3. Also consider behavioral and cognitive / behavioral treatments for stress; sleep bruxism might be expected to improve.  See below for a complete interpretation of data from the polysomnography and Sleep Disorders Inventory.  Thank you for referring this patient to the [facility].  Jordin Fink, Ph.D., ABPP; Diplomate, American Board of Sleep Medicine        Office Spirometry Results:     No flowsheet data found.  Pulmonary Studies Review 10/30/2019   SpO2 99   Height 72.000   Weight 5820.14   BMI (Calculated) 49.4   Predicted Distance 432.27   Predicted Distance Meters (Calculated) 584.35         Assessment:       MOSHE (obstructive sleep apnea)  -     CPAP FOR HOME USE  -     CPAP/BIPAP SUPPLIES    Behaviorally induced insufficient sleep syndrome    Psychophysiological insomnia  -     traZODone (DESYREL) 100 MG tablet; Take 1 tablet (100 mg total) by mouth every evening.  Dispense: 30 tablet; Refill: 11          Outpatient Encounter Medications as of 10/30/2019   Medication Sig Dispense Refill    atorvastatin (LIPITOR) 20 MG tablet Take 1 tablet (20 mg total) by mouth once daily. 30 tablet 11    gabapentin (NEURONTIN) 300 MG capsule Take 1 capsule (300 mg total) by mouth every evening. 30 capsule 11    hydrocortisone (ANUSOL-HC) 25 mg suppository Place 1 suppository (25 mg total) rectally 2 (two)  times daily as needed for Hemorrhoids. 30 suppository 0    lisinopril 10 MG tablet Take 1 tablet (10 mg total) by mouth once daily. 30 tablet 11    multivitamin with minerals tablet Take 1 tablet by mouth.      traZODone (DESYREL) 100 MG tablet Take 1 tablet (100 mg total) by mouth every evening. 30 tablet 11    vitamin E 8.9 unit/g Crea Apply to affected area twice daily 112 g 2    [DISCONTINUED] trazodone (DESYREL) 100 MG tablet Take 1 tablet (100 mg total) by mouth every evening. 30 tablet 11    ranitidine (ZANTAC) 150 MG tablet Take 1 tablet (150 mg total) by mouth 2 (two) times daily. 60 tablet 0     No facility-administered encounter medications on file as of 10/30/2019.      Plan:       Requested Prescriptions     Signed Prescriptions Disp Refills    traZODone (DESYREL) 100 MG tablet 30 tablet 11     Sig: Take 1 tablet (100 mg total) by mouth every evening.     Problem List Items Addressed This Visit     Behaviorally induced insufficient sleep syndrome    MOSHE (obstructive sleep apnea) - Primary    Relevant Orders    CPAP FOR HOME USE    CPAP/BIPAP SUPPLIES    Psychophysiological insomnia    Relevant Medications    traZODone (DESYREL) 100 MG tablet             Follow up in about 6 weeks (around 12/11/2019) for CPAP initial download - bring CPAP machine.    MEDICAL DECISION MAKING: Moderate to high complexity.  Overall, the multiple problems listed are of moderate to high severity that may impact quality of life and activities of daily living. Side effects of medications, treatment plan as well as options and alternatives reviewed and discussed with patient. There was counseling of patient concerning these issues.    Total time spent in face to face counseling and coordination of care - 40  minutes over 50% of time was used in discussion of prognosis, risks, benefits of treatment, instructions and compliance with regimen . Discussion with other physicians or health care providers (DME, NP, pharmacy,  respiratory therapy) occurred.

## 2019-10-30 NOTE — PATIENT INSTRUCTIONS
CPAP HABITUATION PROCEDURE    Jordin Fink, Ph.D., [facility] and [facility] M.D.  Sleep Disorders Center, [facility]    Some people have difficulty adjusting to CPAP/BiPAP/AutoCPAP.  This is not unusual or hard to understand: Breathing with CPAP is different from ordinary breathing, and this difference is aversive to some. The problem can be overcome, however, and the benefits CPAP confers are certainly worth the effort.  Below, you will find a simple and gradual way to get used to CPAP before you try to use it all night, every night.  The essence of this procedure is to relax and let breathing with CPAP become a habit.  It may take about 2 weeks, and involves the followin. CPAP while awake and comfortably seated, during the late evening.     2. CPAP in bed while attempting sleep at night.     3. If your discomfort is too great at any time, discontinue and attempt again later the same night, for the same amount of time.   4. You and your physician may alter the times and pressures if necessary.     5. If you find that it is very easy to get used to CPAP, you may start using it every night when you are comfortable enough to do so.  6. IMPORTANT REMINDER: If you have a cold or sinus congestion it is okay to miss a night or two of CPAP. Consider using antihistamines or decongestants to clear up your sinus congestion prior to sleeping.    DAYS  1-3   Start CPAP while awake and comfortably seated during the late evening, after having prepared for bed.  You may do this while watching television, listening to music or reading. Use for 1 hour, then take off CPAP and go directly to bed to sleep    DAYS  4-6   Start CPAP when you go to bed and use for 1 hour, or until you fall asleep.  If your discomfort is too great at any time, discontinue and attempt again later the same night, for the same designated amount of time (1 hour).     DAYS  7-9   Increase time with CPAP to 2 hours a  night.  If your discomfort is too great at any time, discontinue and attempt again later the same night, for the same designated amount of time (2 hours).     DAYS 10-12  Increase time with CPAP to 3 hours a night. If your discomfort is too great at any time, discontinue and attempt again later the same night, for the same designated amount of time (3 hours).     DAYS 13-15   Sleep the entire night with CPAP.     OPTIONAL: You may use Progressive Muscle Relaxation (PMR) to help put you at ease when using CPAP; do PMR twice each day, once in the morning or afternoon, and once in the evening just before using CPAP. You may do PMR prior to any attempt until you are comfortable with CPAP.

## 2019-12-11 ENCOUNTER — OFFICE VISIT (OUTPATIENT)
Dept: SLEEP MEDICINE | Facility: CLINIC | Age: 39
End: 2019-12-11
Payer: COMMERCIAL

## 2019-12-11 VITALS
SYSTOLIC BLOOD PRESSURE: 112 MMHG | WEIGHT: 315 LBS | HEART RATE: 74 BPM | DIASTOLIC BLOOD PRESSURE: 72 MMHG | RESPIRATION RATE: 18 BRPM | HEIGHT: 72 IN | BODY MASS INDEX: 42.66 KG/M2 | OXYGEN SATURATION: 98 %

## 2019-12-11 DIAGNOSIS — G47.33 OSA (OBSTRUCTIVE SLEEP APNEA): ICD-10-CM

## 2019-12-11 DIAGNOSIS — E66.01 MORBID OBESITY WITH BMI OF 50.0-59.9, ADULT: ICD-10-CM

## 2019-12-11 DIAGNOSIS — G47.26 CIRCADIAN RHYTHM SLEEP DISORDER, SHIFT WORK TYPE: Primary | ICD-10-CM

## 2019-12-11 DIAGNOSIS — I10 ESSENTIAL HYPERTENSION: ICD-10-CM

## 2019-12-11 PROCEDURE — 3078F PR MOST RECENT DIASTOLIC BLOOD PRESSURE < 80 MM HG: ICD-10-PCS | Mod: CPTII,S$GLB,, | Performed by: NURSE PRACTITIONER

## 2019-12-11 PROCEDURE — 3008F PR BODY MASS INDEX (BMI) DOCUMENTED: ICD-10-PCS | Mod: CPTII,S$GLB,, | Performed by: NURSE PRACTITIONER

## 2019-12-11 PROCEDURE — 99999 PR PBB SHADOW E&M-EST. PATIENT-LVL III: CPT | Mod: PBBFAC,,, | Performed by: NURSE PRACTITIONER

## 2019-12-11 PROCEDURE — 99214 OFFICE O/P EST MOD 30 MIN: CPT | Mod: S$GLB,,, | Performed by: NURSE PRACTITIONER

## 2019-12-11 PROCEDURE — 3008F BODY MASS INDEX DOCD: CPT | Mod: CPTII,S$GLB,, | Performed by: NURSE PRACTITIONER

## 2019-12-11 PROCEDURE — 99214 PR OFFICE/OUTPT VISIT, EST, LEVL IV, 30-39 MIN: ICD-10-PCS | Mod: S$GLB,,, | Performed by: NURSE PRACTITIONER

## 2019-12-11 PROCEDURE — 3074F PR MOST RECENT SYSTOLIC BLOOD PRESSURE < 130 MM HG: ICD-10-PCS | Mod: CPTII,S$GLB,, | Performed by: NURSE PRACTITIONER

## 2019-12-11 PROCEDURE — 99999 PR PBB SHADOW E&M-EST. PATIENT-LVL III: ICD-10-PCS | Mod: PBBFAC,,, | Performed by: NURSE PRACTITIONER

## 2019-12-11 PROCEDURE — 3078F DIAST BP <80 MM HG: CPT | Mod: CPTII,S$GLB,, | Performed by: NURSE PRACTITIONER

## 2019-12-11 PROCEDURE — 3074F SYST BP LT 130 MM HG: CPT | Mod: CPTII,S$GLB,, | Performed by: NURSE PRACTITIONER

## 2019-12-11 NOTE — PROGRESS NOTES
Subjective:      Patient ID: Juan Pablo Saeed is a 39 y.o. male.    Chief Complaint: Sleep Apnea    HPI  Patient presents to the office today for evaluation of sleep apnea.  He started on CPAP 16 cm water pressure a few weeks ago.  He is still habituated, but definitely benefitting from use.  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  [place] Sleepiness Scale score improved from 13 to 6.    /72   Pulse 74   Resp 18   Ht 6' (1.829 m)   Wt (!) 168.6 kg (371 lb 11.1 oz)   SpO2 98%   BMI 50.41 kg/m²   Body mass index is 50.41 kg/m².    Review of Systems   Constitutional: Negative.    HENT: Negative.    Respiratory: Negative.    Cardiovascular: Negative.    Musculoskeletal: Negative.    Gastrointestinal: Negative.    Neurological: Negative.    Psychiatric/Behavioral: Negative.      Objective:      Physical Exam   Constitutional: He is oriented to person, place, and time. He appears well-developed and well-nourished.   obese   HENT:   Head: Normocephalic and atraumatic.   Mallampati Score: III     Neck: Normal range of motion. Neck supple.   Cardiovascular: Normal rate and regular rhythm.   Pulmonary/Chest: Effort normal and breath sounds normal.   Abdominal: Soft.   Musculoskeletal: Normal range of motion.   Neurological: He is alert and oriented to person, place, and time.   Skin: Skin is warm and dry.   Psychiatric: He has a normal mood and affect.     Personal Diagnostic Review  Compliance Information  Juan Pablo Saeed  Device: DreamStation Auto CPAP (500X110) ([id number])  Compliance Summary  11/11/2019 - 12/10/2019 (30 days)  Days with Device Usage 25 days  Days without Device Usage 5 days  Percent Days with Device Usage 83.3%  Cumulative Usage 3 days 23 hrs. 17 mins. 17 secs.  Maximum Usage (1 Day) 6 hrs. 12 mins. 39 secs.  Average Usage (All Days) 3 hrs. 10 mins. 34 secs.  Average Usage (Days Used) 3 hrs. 48 mins. 41 secs.  Minimum  Usage (1 Day) 3 mins. 14 secs.  Percent of Days with Usage >= 4 Hours 43.3%  Percent of Days with Usage < 4 Hours 56.7%  Date Range  Total Blower Time 3 days 23 hrs. 18 mins. 2 secs.  Average AHI 1.8  Auto-CPAP Summary  Auto-CPAP Mean Pressure 9.5 cmH2O  Auto-CPAP Peak Average Pressure 14.7 cmH2O  Average Device Pressure <= 90% of Time 11.9 cmH2O  Average Time in Large Leak Per Day 22 sec        Assessment:       1. Circadian rhythm sleep disorder, shift work type    2. MOSHE (obstructive sleep apnea)    3. Essential hypertension    4. Morbid obesity with BMI of 50.0-59.9, adult        Outpatient Encounter Medications as of 12/11/2019   Medication Sig Dispense Refill    atorvastatin (LIPITOR) 20 MG tablet Take 1 tablet (20 mg total) by mouth once daily. 30 tablet 11    gabapentin (NEURONTIN) 300 MG capsule Take 1 capsule (300 mg total) by mouth every evening. 30 capsule 11    hydrocortisone (ANUSOL-HC) 25 mg suppository Place 1 suppository (25 mg total) rectally 2 (two) times daily as needed for Hemorrhoids. 30 suppository 0    lisinopril 10 MG tablet Take 1 tablet (10 mg total) by mouth once daily. 30 tablet 11    multivitamin with minerals tablet Take 1 tablet by mouth.      ranitidine (ZANTAC) 150 MG tablet Take 1 tablet (150 mg total) by mouth 2 (two) times daily. 60 tablet 0    traZODone (DESYREL) 100 MG tablet Take 1 tablet (100 mg total) by mouth every evening. 30 tablet 11    vitamin E 8.9 unit/g Crea Apply to affected area twice daily 112 g 2     No facility-administered encounter medications on file as of 12/11/2019.      No orders of the defined types were placed in this encounter.    Plan:   Continue to habituate with CPAP.  Follow up for 90 day set up today to review compliance.  Discussed weight loss and exercise to improve overall health.  He has changed eating habits with some weight loss.  Hypertension controlled today     Problem List Items Addressed This Visit        Cardiac/Vascular     Essential hypertension       Endocrine    Morbid obesity with BMI of 50.0-59.9, adult    Current Assessment & Plan     Weight loss and exercise to improve overall health.              Other    MOSHE (obstructive sleep apnea)    Overview     AHI 23/hr         Circadian rhythm sleep disorder, shift work type - Primary

## 2019-12-21 ENCOUNTER — PATIENT MESSAGE (OUTPATIENT)
Dept: INTERNAL MEDICINE | Facility: CLINIC | Age: 39
End: 2019-12-21

## 2019-12-23 ENCOUNTER — PATIENT MESSAGE (OUTPATIENT)
Dept: INTERNAL MEDICINE | Facility: CLINIC | Age: 39
End: 2019-12-23

## 2020-01-02 ENCOUNTER — OFFICE VISIT (OUTPATIENT)
Dept: GASTROENTEROLOGY | Facility: CLINIC | Age: 40
End: 2020-01-02
Payer: COMMERCIAL

## 2020-01-02 VITALS
WEIGHT: 315 LBS | DIASTOLIC BLOOD PRESSURE: 74 MMHG | SYSTOLIC BLOOD PRESSURE: 122 MMHG | HEIGHT: 72 IN | BODY MASS INDEX: 42.66 KG/M2

## 2020-01-02 DIAGNOSIS — R63.4 WEIGHT LOSS: ICD-10-CM

## 2020-01-02 DIAGNOSIS — K62.89 ANORECTAL PAIN: ICD-10-CM

## 2020-01-02 DIAGNOSIS — K60.1 CHRONIC ANAL FISSURE: Primary | ICD-10-CM

## 2020-01-02 DIAGNOSIS — K59.04 CHRONIC IDIOPATHIC CONSTIPATION: ICD-10-CM

## 2020-01-02 PROCEDURE — 3008F PR BODY MASS INDEX (BMI) DOCUMENTED: ICD-10-PCS | Mod: CPTII,S$GLB,, | Performed by: INTERNAL MEDICINE

## 2020-01-02 PROCEDURE — 99203 OFFICE O/P NEW LOW 30 MIN: CPT | Mod: S$GLB,,, | Performed by: INTERNAL MEDICINE

## 2020-01-02 PROCEDURE — 3078F PR MOST RECENT DIASTOLIC BLOOD PRESSURE < 80 MM HG: ICD-10-PCS | Mod: CPTII,S$GLB,, | Performed by: INTERNAL MEDICINE

## 2020-01-02 PROCEDURE — 3074F SYST BP LT 130 MM HG: CPT | Mod: CPTII,S$GLB,, | Performed by: INTERNAL MEDICINE

## 2020-01-02 PROCEDURE — 99999 PR PBB SHADOW E&M-EST. PATIENT-LVL III: ICD-10-PCS | Mod: PBBFAC,,, | Performed by: INTERNAL MEDICINE

## 2020-01-02 PROCEDURE — 3008F BODY MASS INDEX DOCD: CPT | Mod: CPTII,S$GLB,, | Performed by: INTERNAL MEDICINE

## 2020-01-02 PROCEDURE — 3074F PR MOST RECENT SYSTOLIC BLOOD PRESSURE < 130 MM HG: ICD-10-PCS | Mod: CPTII,S$GLB,, | Performed by: INTERNAL MEDICINE

## 2020-01-02 PROCEDURE — 3078F DIAST BP <80 MM HG: CPT | Mod: CPTII,S$GLB,, | Performed by: INTERNAL MEDICINE

## 2020-01-02 PROCEDURE — 99999 PR PBB SHADOW E&M-EST. PATIENT-LVL III: CPT | Mod: PBBFAC,,, | Performed by: INTERNAL MEDICINE

## 2020-01-02 PROCEDURE — 99203 PR OFFICE/OUTPT VISIT, NEW, LEVL III, 30-44 MIN: ICD-10-PCS | Mod: S$GLB,,, | Performed by: INTERNAL MEDICINE

## 2020-01-02 NOTE — PROGRESS NOTES
Subjective:       Patient ID: Juan Pablo Saeed is a 39 y.o. male.    Chief Complaint: Weight Loss and Constipation    The patient has history of constipation ongoing for ~ 2 years and worse over the past year. He had an anal fissure some 5 months ago and is on Metameusil.  He uses hemorrhoid cream for anal pain. He denies abdominal pain, bit admits to excessive gas. There is no nausea or vomiting. He had a period of lost of weight ~ 2 months ago which was rapid over a 2 week period. It has since hit a plateau. His appetite is okay. There is no BRBPR or melena. There is no aversion to the sight or smell of food except [employer]oodles. There is early satiety.    Review of Systems   Constitutional: Positive for unexpected weight change. Negative for activity change, appetite change, chills, diaphoresis, fatigue and fever.   HENT: Negative for congestion, ear discharge, facial swelling, hearing loss, nosebleeds, postnasal drip, sinus pressure, sneezing, tinnitus, trouble swallowing and voice change.    Eyes: Negative for photophobia, redness and visual disturbance.   Respiratory: Positive for cough. Negative for chest tightness, shortness of breath and wheezing.    Cardiovascular: Positive for chest pain. Negative for palpitations.   Gastrointestinal: Positive for constipation and rectal pain. Negative for abdominal distention, abdominal pain, blood in stool, diarrhea, nausea and vomiting.   Genitourinary: Negative for difficulty urinating, discharge, dysuria, flank pain, frequency, hematuria, scrotal swelling, testicular pain and urgency.   Musculoskeletal: Negative for arthralgias, back pain, gait problem, joint swelling, myalgias and neck stiffness.   Skin: Positive for rash. Negative for color change, pallor and wound.   Neurological: Negative for dizziness, tremors, seizures, syncope, facial asymmetry, speech difficulty, weakness, light-headedness, numbness and headaches.   Hematological: Negative for adenopathy.  Does not bruise/bleed easily.   Psychiatric/Behavioral: Negative for agitation, confusion, hallucinations, sleep disturbance and suicidal ideas.       Objective:      Physical Exam   Constitutional: He is oriented to person, place, and time. He appears well-developed and well-nourished. No distress.   Morbid Obesity   HENT:   Head: Normocephalic and atraumatic.   Nose: Nose normal.   Mouth/Throat: Oropharynx is clear and moist. No oropharyngeal exudate.   Eyes: Pupils are equal, round, and reactive to light. Conjunctivae are normal. No scleral icterus.   Neck: Normal range of motion. Neck supple. No thyromegaly present.   Cardiovascular: Normal rate and regular rhythm. Exam reveals no gallop and no friction rub.   No murmur heard.  Pulmonary/Chest: Effort normal and breath sounds normal. No respiratory distress. He has no wheezes. He has no rales.   Abdominal: Soft. Bowel sounds are normal. He exhibits distension. He exhibits no mass. There is no tenderness. There is no rebound and no guarding.   Musculoskeletal: He exhibits no edema or tenderness.   Lymphadenopathy:     He has no cervical adenopathy.   Neurological: He is alert and oriented to person, place, and time. He exhibits normal muscle tone. Coordination normal.   Skin: Skin is warm. No rash noted. He is not diaphoretic.   Psychiatric: He has a normal mood and affect. His behavior is normal. Judgment and thought content normal.   Vitals reviewed.      Assessment:   No diagnosis found.   Chronic constipation  Anal fissure  Anorectal pain associated with above arm  Weight loss           This has plateaued over the past several weeks.  Plan:   Likely this related to starting of Trazadone. Has held for 2 weeks and stool now seems better. Continue to monitor; use of Metameucil may be adding to gas. May want to try Miralax. If not continued improvement will look to do more evaluation and upgrade therapy.

## 2020-01-09 ENCOUNTER — PATIENT MESSAGE (OUTPATIENT)
Dept: ADMINISTRATIVE | Facility: OTHER | Age: 40
End: 2020-01-09

## 2020-01-10 ENCOUNTER — OFFICE VISIT (OUTPATIENT)
Dept: PULMONOLOGY | Facility: CLINIC | Age: 40
End: 2020-01-10
Payer: COMMERCIAL

## 2020-01-10 VITALS
BODY MASS INDEX: 42.66 KG/M2 | HEART RATE: 89 BPM | HEIGHT: 72 IN | DIASTOLIC BLOOD PRESSURE: 82 MMHG | WEIGHT: 315 LBS | OXYGEN SATURATION: 98 % | RESPIRATION RATE: 18 BRPM | SYSTOLIC BLOOD PRESSURE: 130 MMHG

## 2020-01-10 DIAGNOSIS — E66.01 MORBID OBESITY WITH BMI OF 50.0-59.9, ADULT: ICD-10-CM

## 2020-01-10 DIAGNOSIS — G47.26 SHIFT WORK SLEEP DISORDER: ICD-10-CM

## 2020-01-10 DIAGNOSIS — G47.33 OSA (OBSTRUCTIVE SLEEP APNEA): Primary | ICD-10-CM

## 2020-01-10 PROCEDURE — 3079F PR MOST RECENT DIASTOLIC BLOOD PRESSURE 80-89 MM HG: ICD-10-PCS | Mod: CPTII,S$GLB,, | Performed by: NURSE PRACTITIONER

## 2020-01-10 PROCEDURE — 99999 PR PBB SHADOW E&M-EST. PATIENT-LVL III: ICD-10-PCS | Mod: PBBFAC,,, | Performed by: NURSE PRACTITIONER

## 2020-01-10 PROCEDURE — 99213 PR OFFICE/OUTPT VISIT, EST, LEVL III, 20-29 MIN: ICD-10-PCS | Mod: S$GLB,,, | Performed by: NURSE PRACTITIONER

## 2020-01-10 PROCEDURE — 3008F PR BODY MASS INDEX (BMI) DOCUMENTED: ICD-10-PCS | Mod: CPTII,S$GLB,, | Performed by: NURSE PRACTITIONER

## 2020-01-10 PROCEDURE — 3079F DIAST BP 80-89 MM HG: CPT | Mod: CPTII,S$GLB,, | Performed by: NURSE PRACTITIONER

## 2020-01-10 PROCEDURE — 99213 OFFICE O/P EST LOW 20 MIN: CPT | Mod: S$GLB,,, | Performed by: NURSE PRACTITIONER

## 2020-01-10 PROCEDURE — 99999 PR PBB SHADOW E&M-EST. PATIENT-LVL III: CPT | Mod: PBBFAC,,, | Performed by: NURSE PRACTITIONER

## 2020-01-10 PROCEDURE — 3008F BODY MASS INDEX DOCD: CPT | Mod: CPTII,S$GLB,, | Performed by: NURSE PRACTITIONER

## 2020-01-10 PROCEDURE — 3075F SYST BP GE 130 - 139MM HG: CPT | Mod: CPTII,S$GLB,, | Performed by: NURSE PRACTITIONER

## 2020-01-10 PROCEDURE — 3075F PR MOST RECENT SYSTOLIC BLOOD PRESS GE 130-139MM HG: ICD-10-PCS | Mod: CPTII,S$GLB,, | Performed by: NURSE PRACTITIONER

## 2020-01-10 NOTE — PROGRESS NOTES
Subjective:      Patient ID: Juan Pablo Saeed is a 39 y.o. male.    Chief Complaint: Sleep Apnea    HPI  Patient presents to the office today for evaluation of sleep apnea.  He started on CPAP 16 cm water pressure a few weeks ago.  He is still habituated, but definitely benefitting from use.  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    Patient Active Problem List   Diagnosis    Essential hypertension    MOSHE (obstructive sleep apnea)    Circadian rhythm sleep disorder, shift work type    Psychophysiological insomnia    Sleep-related bruxism    Behaviorally induced insufficient sleep syndrome    Inadequate sleep hygiene    Idiopathic peripheral neuropathy    Lateral epicondylitis of left elbow    Ganglion cyst of left foot    Proteinuria    Mixed hyperlipidemia    Stage 3 chronic kidney disease    Other hemorrhoids    GERD (gastroesophageal reflux disease)    Morbid obesity with BMI of 50.0-59.9, adult       /82   Pulse 89   Resp 18   Ht 6' (1.829 m)   Wt (!) 169.8 kg (374 lb 5.5 oz)   SpO2 98%   BMI 50.77 kg/m²   Body mass index is 50.77 kg/m².    Review of Systems   Constitutional: Negative.    HENT: Negative.    Respiratory: Negative.    Cardiovascular: Negative.    Musculoskeletal: Negative.    Gastrointestinal: Negative.    Neurological: Negative.    Psychiatric/Behavioral: Negative.      Objective:      Physical Exam   Constitutional: He is oriented to person, place, and time. He appears well-developed and well-nourished.   obese   HENT:   Head: Normocephalic and atraumatic.   Neck: Normal range of motion. Neck supple.   Neurological: He is alert and oriented to person, place, and time.   Skin: Skin is warm and dry.   Psychiatric: He has a normal mood and affect.     Personal Diagnostic Review  Compliance Information  Juan Pablo Saeed  Device: DreamStation Auto CPAP (500X110) ([id number])  Summary  Report  Patient:  [facility]imes[facility]  12/11/2019 - 1/9/2020  YOB: 1980  Mask:  Compliance Summary  12/11/2019 - 1/9/2020 (30 days)  Days with Device Usage 23 days  Days without Device Usage 7 days  Percent Days with Device Usage 76.7%  Cumulative Usage 4 days 5 hrs. 4 mins. 35 secs.  Maximum Usage (1 Day) 7 hrs. 3 mins. 14 secs.  Average Usage (All Days) 3 hrs. 22 mins. 9 secs.  Average Usage (Days Used) 4 hrs. 23 mins. 40 secs.  Minimum Usage (1 Day) 1 hrs. 1 mins. 7 secs.  Percent of Days with Usage >= 4 Hours 46.7%  Percent of Days with Usage < 4 Hours 53.3%  Date Range  Average AHI 1.8  Auto-CPAP Summary  Auto-CPAP Mean Pressure 9.9 cmH2O  Auto-CPAP Peak Average Pressure 14.4 cmH2O  Average Device Pressure <= 90% of Time 12.7 cmH2O  Average Time in Large Leak Per Day 0 secs        Assessment:       1. MOSHE (obstructive sleep apnea)    2. Morbid obesity with BMI of 50.0-59.9, adult    3. Shift work sleep disorder        Outpatient Encounter Medications as of 1/10/2020   Medication Sig Dispense Refill    atorvastatin (LIPITOR) 20 MG tablet Take 1 tablet (20 mg total) by mouth once daily. 30 tablet 11    gabapentin (NEURONTIN) 300 MG capsule Take 1 capsule (300 mg total) by mouth every evening. 30 capsule 11    hydrocortisone (ANUSOL-HC) 25 mg suppository Place 1 suppository (25 mg total) rectally 2 (two) times daily as needed for Hemorrhoids. 30 suppository 0    lisinopril 10 MG tablet Take 1 tablet (10 mg total) by mouth once daily. 30 tablet 11    multivitamin with minerals tablet Take 1 tablet by mouth.      ranitidine (ZANTAC) 150 MG tablet Take 1 tablet (150 mg total) by mouth 2 (two) times daily. 60 tablet 0    traZODone (DESYREL) 100 MG tablet Take 1 tablet (100 mg total) by mouth every evening. 30 tablet 11    vitamin E 8.9 unit/g Crea Apply to affected area twice daily 112 g 2     No facility-administered encounter medications on file as of 1/10/2020.      No orders of the  defined types were placed in this encounter.    Plan:   Average use 3hr 22min. Increase time on device. Doing well on settings.   Follow up before 90 day set up date.     Problem List Items Addressed This Visit        Endocrine    Morbid obesity with BMI of 50.0-59.9, adult       Other    MOSHE (obstructive sleep apnea) - Primary    Overview     AHI 23/hr           Other Visit Diagnoses     Shift work sleep disorder          Weight loss and exercise to improve overall health.    Total time spent in face to face counseling and coordination of care -  15minutes over 50% of time was used in discussion of prognosis, risks, benefits of treatment, instructions and compliance with regimen .

## 2020-01-23 DIAGNOSIS — E78.5 HYPERLIPIDEMIA, UNSPECIFIED HYPERLIPIDEMIA TYPE: ICD-10-CM

## 2020-01-23 RX ORDER — ATORVASTATIN CALCIUM 20 MG/1
20 TABLET, FILM COATED ORAL DAILY
Qty: 30 TABLET | Refills: 0 | Status: SHIPPED | OUTPATIENT
Start: 2020-01-23 | End: 2020-02-24 | Stop reason: SDUPTHER

## 2020-01-23 NOTE — TELEPHONE ENCOUNTER
Patient has not seen Dr. Hampton in over a year.  Refill is given but the patient needs an appointment for an updated physical.

## 2020-01-28 ENCOUNTER — TELEPHONE (OUTPATIENT)
Dept: INTERNAL MEDICINE | Facility: CLINIC | Age: 40
End: 2020-01-28

## 2020-01-28 NOTE — TELEPHONE ENCOUNTER
Per pt's request he would like to schedule an annual appt with PCP on 2/3/20 or 2/4/20 due to having limited off days. Pt has been scheduled and is aware of date, time and location of appt.

## 2020-02-03 ENCOUNTER — LAB VISIT (OUTPATIENT)
Dept: LAB | Facility: HOSPITAL | Age: 40
End: 2020-02-03
Attending: INTERNAL MEDICINE
Payer: COMMERCIAL

## 2020-02-03 ENCOUNTER — OFFICE VISIT (OUTPATIENT)
Dept: INTERNAL MEDICINE | Facility: CLINIC | Age: 40
End: 2020-02-03
Payer: COMMERCIAL

## 2020-02-03 VITALS
HEIGHT: 72 IN | BODY MASS INDEX: 42.66 KG/M2 | SYSTOLIC BLOOD PRESSURE: 110 MMHG | DIASTOLIC BLOOD PRESSURE: 80 MMHG | TEMPERATURE: 99 F | RESPIRATION RATE: 18 BRPM | WEIGHT: 315 LBS | HEART RATE: 70 BPM

## 2020-02-03 DIAGNOSIS — I10 ESSENTIAL HYPERTENSION: ICD-10-CM

## 2020-02-03 DIAGNOSIS — E66.01 MORBID OBESITY WITH BMI OF 50.0-59.9, ADULT: ICD-10-CM

## 2020-02-03 DIAGNOSIS — Z00.00 ROUTINE GENERAL MEDICAL EXAMINATION AT HEALTH CARE FACILITY: ICD-10-CM

## 2020-02-03 DIAGNOSIS — E78.2 MIXED HYPERLIPIDEMIA: ICD-10-CM

## 2020-02-03 DIAGNOSIS — G60.9 IDIOPATHIC PERIPHERAL NEUROPATHY: ICD-10-CM

## 2020-02-03 DIAGNOSIS — G47.33 OSA (OBSTRUCTIVE SLEEP APNEA): ICD-10-CM

## 2020-02-03 DIAGNOSIS — R07.89 ATYPICAL CHEST PAIN: ICD-10-CM

## 2020-02-03 LAB
ALBUMIN SERPL BCP-MCNC: 3.8 G/DL (ref 3.5–5.2)
ALP SERPL-CCNC: 58 U/L (ref 55–135)
ALT SERPL W/O P-5'-P-CCNC: 33 U/L (ref 10–44)
ANION GAP SERPL CALC-SCNC: 7 MMOL/L (ref 8–16)
AST SERPL-CCNC: 30 U/L (ref 10–40)
BASOPHILS # BLD AUTO: 0.02 K/UL (ref 0–0.2)
BASOPHILS NFR BLD: 0.4 % (ref 0–1.9)
BILIRUB SERPL-MCNC: 0.7 MG/DL (ref 0.1–1)
BUN SERPL-MCNC: 18 MG/DL (ref 6–20)
CALCIUM SERPL-MCNC: 9.8 MG/DL (ref 8.7–10.5)
CHLORIDE SERPL-SCNC: 103 MMOL/L (ref 95–110)
CHOLEST SERPL-MCNC: 186 MG/DL (ref 120–199)
CHOLEST/HDLC SERPL: 5.2 {RATIO} (ref 2–5)
CO2 SERPL-SCNC: 29 MMOL/L (ref 23–29)
CREAT SERPL-MCNC: 1.6 MG/DL (ref 0.5–1.4)
DIFFERENTIAL METHOD: ABNORMAL
EOSINOPHIL # BLD AUTO: 0.1 K/UL (ref 0–0.5)
EOSINOPHIL NFR BLD: 2.1 % (ref 0–8)
ERYTHROCYTE [DISTWIDTH] IN BLOOD BY AUTOMATED COUNT: 14.3 % (ref 11.5–14.5)
EST. GFR  (AFRICAN AMERICAN): >60 ML/MIN/1.73 M^2
EST. GFR  (NON AFRICAN AMERICAN): 53.1 ML/MIN/1.73 M^2
ESTIMATED AVG GLUCOSE: 120 MG/DL (ref 68–131)
GLUCOSE SERPL-MCNC: 88 MG/DL (ref 70–110)
HBA1C MFR BLD HPLC: 5.8 % (ref 4–5.6)
HCT VFR BLD AUTO: 43.6 % (ref 40–54)
HDLC SERPL-MCNC: 36 MG/DL (ref 40–75)
HDLC SERPL: 19.4 % (ref 20–50)
HGB BLD-MCNC: 13.4 G/DL (ref 14–18)
IMM GRANULOCYTES # BLD AUTO: 0.02 K/UL (ref 0–0.04)
IMM GRANULOCYTES NFR BLD AUTO: 0.4 % (ref 0–0.5)
LDLC SERPL CALC-MCNC: 99 MG/DL (ref 63–159)
LYMPHOCYTES # BLD AUTO: 2.1 K/UL (ref 1–4.8)
LYMPHOCYTES NFR BLD: 38.7 % (ref 18–48)
MCH RBC QN AUTO: 25.9 PG (ref 27–31)
MCHC RBC AUTO-ENTMCNC: 30.7 G/DL (ref 32–36)
MCV RBC AUTO: 84 FL (ref 82–98)
MONOCYTES # BLD AUTO: 0.7 K/UL (ref 0.3–1)
MONOCYTES NFR BLD: 13.3 % (ref 4–15)
NEUTROPHILS # BLD AUTO: 2.4 K/UL (ref 1.8–7.7)
NEUTROPHILS NFR BLD: 45.1 % (ref 38–73)
NONHDLC SERPL-MCNC: 150 MG/DL
NRBC BLD-RTO: 0 /100 WBC
PLATELET # BLD AUTO: 240 K/UL (ref 150–350)
PMV BLD AUTO: 10.4 FL (ref 9.2–12.9)
POTASSIUM SERPL-SCNC: 4.1 MMOL/L (ref 3.5–5.1)
PROT SERPL-MCNC: 7.7 G/DL (ref 6–8.4)
RBC # BLD AUTO: 5.18 M/UL (ref 4.6–6.2)
SODIUM SERPL-SCNC: 139 MMOL/L (ref 136–145)
TRIGL SERPL-MCNC: 255 MG/DL (ref 30–150)
WBC # BLD AUTO: 5.32 K/UL (ref 3.9–12.7)

## 2020-02-03 PROCEDURE — 93005 ELECTROCARDIOGRAM TRACING: CPT | Mod: S$GLB,,, | Performed by: INTERNAL MEDICINE

## 2020-02-03 PROCEDURE — 85025 COMPLETE CBC W/AUTO DIFF WBC: CPT

## 2020-02-03 PROCEDURE — 36415 COLL VENOUS BLD VENIPUNCTURE: CPT | Mod: PO

## 2020-02-03 PROCEDURE — 99999 PR PBB SHADOW E&M-EST. PATIENT-LVL III: ICD-10-PCS | Mod: PBBFAC,,, | Performed by: INTERNAL MEDICINE

## 2020-02-03 PROCEDURE — 93010 EKG 12-LEAD: ICD-10-PCS | Mod: S$GLB,,, | Performed by: INTERNAL MEDICINE

## 2020-02-03 PROCEDURE — 99396 PR PREVENTIVE VISIT,EST,40-64: ICD-10-PCS | Mod: S$GLB,,, | Performed by: INTERNAL MEDICINE

## 2020-02-03 PROCEDURE — 80053 COMPREHEN METABOLIC PANEL: CPT

## 2020-02-03 PROCEDURE — 3079F DIAST BP 80-89 MM HG: CPT | Mod: CPTII,S$GLB,, | Performed by: INTERNAL MEDICINE

## 2020-02-03 PROCEDURE — 3074F SYST BP LT 130 MM HG: CPT | Mod: CPTII,S$GLB,, | Performed by: INTERNAL MEDICINE

## 2020-02-03 PROCEDURE — 99396 PREV VISIT EST AGE 40-64: CPT | Mod: S$GLB,,, | Performed by: INTERNAL MEDICINE

## 2020-02-03 PROCEDURE — 93010 ELECTROCARDIOGRAM REPORT: CPT | Mod: S$GLB,,, | Performed by: INTERNAL MEDICINE

## 2020-02-03 PROCEDURE — 83036 HEMOGLOBIN GLYCOSYLATED A1C: CPT

## 2020-02-03 PROCEDURE — 80061 LIPID PANEL: CPT

## 2020-02-03 PROCEDURE — 93005 EKG 12-LEAD: ICD-10-PCS | Mod: S$GLB,,, | Performed by: INTERNAL MEDICINE

## 2020-02-03 PROCEDURE — 99999 PR PBB SHADOW E&M-EST. PATIENT-LVL III: CPT | Mod: PBBFAC,,, | Performed by: INTERNAL MEDICINE

## 2020-02-03 PROCEDURE — 3079F PR MOST RECENT DIASTOLIC BLOOD PRESSURE 80-89 MM HG: ICD-10-PCS | Mod: CPTII,S$GLB,, | Performed by: INTERNAL MEDICINE

## 2020-02-03 PROCEDURE — 3008F PR BODY MASS INDEX (BMI) DOCUMENTED: ICD-10-PCS | Mod: CPTII,S$GLB,, | Performed by: INTERNAL MEDICINE

## 2020-02-03 PROCEDURE — 3074F PR MOST RECENT SYSTOLIC BLOOD PRESSURE < 130 MM HG: ICD-10-PCS | Mod: CPTII,S$GLB,, | Performed by: INTERNAL MEDICINE

## 2020-02-03 PROCEDURE — 3008F BODY MASS INDEX DOCD: CPT | Mod: CPTII,S$GLB,, | Performed by: INTERNAL MEDICINE

## 2020-02-03 RX ORDER — GABAPENTIN 300 MG/1
300 CAPSULE ORAL 2 TIMES DAILY
Qty: 60 CAPSULE | Refills: 11 | Status: SHIPPED | OUTPATIENT
Start: 2020-02-03 | End: 2021-03-15 | Stop reason: SDUPTHER

## 2020-02-03 NOTE — PROGRESS NOTES
Subjective:       Patient ID: Juan Pablo Saeed is a 39 y.o. male.    Chief Complaint: Annual Exam    HPI Patient is a 39-year-old male presenting today for a was initially planned to be his annual physical.  He indicates he has sleep apnea hypertension hyperlipidemia and peripheral neuropathy.  He indicates that everything was doing pretty well except for his neuropathy was little bit more problematic of late.  He has been having some success with the topical CBD product and he wanted make sure I was okay with him using that.  I did discuss with him that I have no issues with him using a topical CBD oil but that there are still some debate in the state over whether every product that is being sold meets our criteria.  I recommended he use a reputable company and not 1 these pop up CBD stores.  He expressed understanding.    In discussion today patient did indicate he was having some issues with some intermittent chest pain.  This chest pain is having both at rest and with exertion.  He does experience some shortness of breath on occasion with it.  No nausea vomiting no cold sweats.  He has not had radiation into his shoulder or arm.  Patient does have risk factors for coronary artery disease including obesity African American brace, blood pressure, sleep apnea.    Review of Systems   Constitutional: Negative for fever and unexpected weight change.   HENT: Negative for hearing loss, postnasal drip and rhinorrhea.    Eyes: Negative for pain and visual disturbance.   Respiratory: Negative for cough, shortness of breath and wheezing.    Cardiovascular: Positive for chest pain. Negative for palpitations.   Gastrointestinal: Negative for constipation, diarrhea, nausea and vomiting.   Genitourinary: Negative for dysuria and hematuria.   Musculoskeletal: Negative for arthralgias, back pain, myalgias and neck stiffness.   Skin: Negative for pallor and rash.   Neurological: Negative for seizures, syncope and headaches.    Hematological: Negative for adenopathy.   Psychiatric/Behavioral: Negative for dysphoric mood. The patient is not nervous/anxious.        Objective:   /80 (BP Location: Left arm, Patient Position: Sitting)   Pulse 70   Temp 98.5 °F (36.9 °C) (Oral)   Resp 18   Ht 6' (1.829 m)   Wt (!) 171.3 kg (377 lb 10.4 oz)   BMI 51.22 kg/m²      Physical Exam   Constitutional: He is oriented to person, place, and time. He appears well-developed and well-nourished. No distress.   HENT:   Head: Normocephalic and atraumatic.   Mouth/Throat: Oropharynx is clear and moist.   Eyes: Pupils are equal, round, and reactive to light. EOM are normal.   Neck: Normal range of motion. Neck supple. No JVD present. No thyromegaly present.   Cardiovascular: Normal rate, regular rhythm, normal heart sounds and intact distal pulses. Exam reveals no gallop and no friction rub.   No murmur heard.  Pulmonary/Chest: Effort normal and breath sounds normal. He has no wheezes. He has no rales.   Abdominal: Soft. Bowel sounds are normal. He exhibits no distension. There is no tenderness. There is no rebound and no guarding.   Musculoskeletal: Normal range of motion. He exhibits no edema.   Lymphadenopathy:     He has no cervical adenopathy.   Neurological: He is alert and oriented to person, place, and time. He has normal reflexes. No cranial nerve deficit.   Skin: Skin is warm and dry. No rash noted.   Psychiatric: He has a normal mood and affect. Judgment normal.   Vitals reviewed.      No visits with results within 2 Week(s) from this visit.   Latest known visit with results is:   Lab Visit on 01/17/2019   Component Date Value    WBC 01/17/2019 4.97     RBC 01/17/2019 5.32     Hemoglobin 01/17/2019 13.6*    Hematocrit 01/17/2019 43.3     Mean Corpuscular Volume 01/17/2019 81*    Mean Corpuscular Hemoglo* 01/17/2019 25.6*    Mean Corpuscular Hemoglo* 01/17/2019 31.4*    RDW 01/17/2019 13.8     Platelets 01/17/2019 244     MPV  01/17/2019 10.5     Immature Granulocytes 01/17/2019 0.2     Gran # (ANC) 01/17/2019 2.1     Immature Grans (Abs) 01/17/2019 0.01     Lymph # 01/17/2019 2.0     Mono # 01/17/2019 0.7     Eos # 01/17/2019 0.1     Baso # 01/17/2019 0.03     nRBC 01/17/2019 0     Gran% 01/17/2019 42.7     Lymph% 01/17/2019 40.8     Mono% 01/17/2019 13.7     Eosinophil% 01/17/2019 2.0     Basophil% 01/17/2019 0.6     Differential Method 01/17/2019 Automated     Sodium 01/17/2019 140     Potassium 01/17/2019 4.1     Chloride 01/17/2019 103     CO2 01/17/2019 28     Glucose 01/17/2019 95     BUN, Bld 01/17/2019 16     Creatinine 01/17/2019 1.7*    Calcium 01/17/2019 10.4     Total Protein 01/17/2019 8.1     Albumin 01/17/2019 4.0     Total Bilirubin 01/17/2019 0.8     Alkaline Phosphatase 01/17/2019 56     AST 01/17/2019 30     ALT 01/17/2019 35     Anion Gap 01/17/2019 9     eGFR if  01/17/2019 57.4*    eGFR if non  Amer* 01/17/2019 49.7*    Cholesterol 01/17/2019 171     Triglycerides 01/17/2019 209*    HDL 01/17/2019 39*    LDL Cholesterol 01/17/2019 90.2     Hdl/Cholesterol Ratio 01/17/2019 22.8     Total Cholesterol/HDL Ra* 01/17/2019 4.4     Non-HDL Cholesterol 01/17/2019 132     Hemoglobin A1C 01/17/2019 5.8*    Estimated Avg Glucose 01/17/2019 120        EKG reveals sinus rhythm with no ischemic changes.    Assessment:       1. Atypical chest pain    2. Routine general medical examination at health care facility    3. MOSHE (obstructive sleep apnea)    4. Essential hypertension    5. Mixed hyperlipidemia    6. Morbid obesity with BMI of 50.0-59.9, adult    7. Idiopathic peripheral neuropathy        Plan:   MOSHE (obstructive sleep apnea)  Patient is compliant with use of cpap, using it the majority of nights.  Benefit from the use of the device is noted.      Essential hypertension  Blood pressure is under good control.  We will continue the current regimen.  Will work on  regular aerobic exercise and a low salt diet.        Atypical chest pain  Comments:  EKG  Orders:  -     EKG 12-lead  -     Echocardiogram pharmacological stress; Future    Routine general medical examination at health care facility  -     CBC auto differential; Future; Expected date: 02/03/2020  -     Comprehensive metabolic panel; Future; Expected date: 02/03/2020  -     Lipid panel; Future; Expected date: 02/03/2020  -     Hemoglobin A1c; Future; Expected date: 02/03/2020    MOSHE (obstructive sleep apnea)  Comments:  continue cpap    Essential hypertension  Comments:  stable, under control    Mixed hyperlipidemia  Comments:  Continue lipitor, update labs    Morbid obesity with BMI of 50.0-59.9, adult  Comments:  Continue working with calorie restriction.    Idiopathic peripheral neuropathy  Comments:  Increase gabapentin to 300 mg twice daily for neuropathy.    Orders:  -     gabapentin (NEURONTIN) 300 MG capsule; Take 1 capsule (300 mg total) by mouth 2 (two) times daily.  Dispense: 60 capsule; Refill: 11     We will get a stress test to rule out cardiac etiology for his chest discomfort.  He will continue his medications as prescribed.  We will update lab work.  Will increase his gabapentin to 300 mg b.i.d. for his neuropathy.  He may continue these the over-the-counter CBD oil.      Follow up in about 3 weeks (around 2/24/2020).

## 2020-02-11 ENCOUNTER — PATIENT MESSAGE (OUTPATIENT)
Dept: INTERNAL MEDICINE | Facility: CLINIC | Age: 40
End: 2020-02-11

## 2020-02-11 ENCOUNTER — TELEPHONE (OUTPATIENT)
Dept: CARDIOLOGY | Facility: CLINIC | Age: 40
End: 2020-02-11

## 2020-02-11 DIAGNOSIS — R07.89 OTHER CHEST PAIN: ICD-10-CM

## 2020-02-11 DIAGNOSIS — R07.89 ATYPICAL CHEST PAIN: Primary | ICD-10-CM

## 2020-02-11 DIAGNOSIS — I10 ESSENTIAL HYPERTENSION: ICD-10-CM

## 2020-02-12 ENCOUNTER — TELEPHONE (OUTPATIENT)
Dept: INTERNAL MEDICINE | Facility: CLINIC | Age: 40
End: 2020-02-12

## 2020-02-12 NOTE — TELEPHONE ENCOUNTER
----- Message from Josey Urena sent at 2/12/2020 11:28 AM CST -----  Contact: pt   Pt calling regarding test on 03/31/2020 need to get it r/s he is not able to make it. Please call patient before scheduling his appt for he need to make sure he is off. [phone]           Thanks   Josey Urena

## 2020-02-12 NOTE — TELEPHONE ENCOUNTER
----- Message from Melody Suarez sent at 2020  9:09 AM CST -----  Dr. Bhat the pt would like to schedule all test after  and the order will  this week. Can you enter new cupid orders so I can schedule patient as he wish. Thanks

## 2020-02-24 DIAGNOSIS — E78.5 HYPERLIPIDEMIA, UNSPECIFIED HYPERLIPIDEMIA TYPE: ICD-10-CM

## 2020-02-24 RX ORDER — ATORVASTATIN CALCIUM 20 MG/1
20 TABLET, FILM COATED ORAL DAILY
Qty: 30 TABLET | Refills: 3 | Status: SHIPPED | OUTPATIENT
Start: 2020-02-24 | End: 2020-07-09 | Stop reason: SDUPTHER

## 2020-02-27 ENCOUNTER — OFFICE VISIT (OUTPATIENT)
Dept: PULMONOLOGY | Facility: CLINIC | Age: 40
End: 2020-02-27
Payer: COMMERCIAL

## 2020-02-27 ENCOUNTER — OFFICE VISIT (OUTPATIENT)
Dept: INTERNAL MEDICINE | Facility: CLINIC | Age: 40
End: 2020-02-27
Payer: COMMERCIAL

## 2020-02-27 VITALS
SYSTOLIC BLOOD PRESSURE: 124 MMHG | RESPIRATION RATE: 17 BRPM | OXYGEN SATURATION: 98 % | WEIGHT: 315 LBS | HEIGHT: 72 IN | BODY MASS INDEX: 42.66 KG/M2 | HEART RATE: 64 BPM | DIASTOLIC BLOOD PRESSURE: 76 MMHG

## 2020-02-27 VITALS
SYSTOLIC BLOOD PRESSURE: 138 MMHG | TEMPERATURE: 98 F | HEIGHT: 72 IN | WEIGHT: 315 LBS | BODY MASS INDEX: 42.66 KG/M2 | HEART RATE: 76 BPM | DIASTOLIC BLOOD PRESSURE: 76 MMHG

## 2020-02-27 DIAGNOSIS — F51.04 PSYCHOPHYSIOLOGICAL INSOMNIA: ICD-10-CM

## 2020-02-27 DIAGNOSIS — F51.12 BEHAVIORALLY INDUCED INSUFFICIENT SLEEP SYNDROME: ICD-10-CM

## 2020-02-27 DIAGNOSIS — I10 ESSENTIAL HYPERTENSION: ICD-10-CM

## 2020-02-27 DIAGNOSIS — G47.26 CIRCADIAN RHYTHM SLEEP DISORDER, SHIFT WORK TYPE: ICD-10-CM

## 2020-02-27 DIAGNOSIS — E78.2 MIXED HYPERLIPIDEMIA: ICD-10-CM

## 2020-02-27 DIAGNOSIS — E88.819 INSULIN RESISTANCE: ICD-10-CM

## 2020-02-27 DIAGNOSIS — E66.01 MORBID OBESITY WITH BMI OF 50.0-59.9, ADULT: ICD-10-CM

## 2020-02-27 DIAGNOSIS — R07.89 ATYPICAL CHEST PAIN: ICD-10-CM

## 2020-02-27 DIAGNOSIS — G47.33 OSA (OBSTRUCTIVE SLEEP APNEA): Primary | ICD-10-CM

## 2020-02-27 PROCEDURE — 3075F SYST BP GE 130 - 139MM HG: CPT | Mod: CPTII,S$GLB,, | Performed by: INTERNAL MEDICINE

## 2020-02-27 PROCEDURE — 3074F SYST BP LT 130 MM HG: CPT | Mod: CPTII,S$GLB,, | Performed by: NURSE PRACTITIONER

## 2020-02-27 PROCEDURE — 99214 PR OFFICE/OUTPT VISIT, EST, LEVL IV, 30-39 MIN: ICD-10-PCS | Mod: S$GLB,,, | Performed by: INTERNAL MEDICINE

## 2020-02-27 PROCEDURE — 3078F PR MOST RECENT DIASTOLIC BLOOD PRESSURE < 80 MM HG: ICD-10-PCS | Mod: CPTII,S$GLB,, | Performed by: INTERNAL MEDICINE

## 2020-02-27 PROCEDURE — 3078F DIAST BP <80 MM HG: CPT | Mod: CPTII,S$GLB,, | Performed by: INTERNAL MEDICINE

## 2020-02-27 PROCEDURE — 99999 PR PBB SHADOW E&M-EST. PATIENT-LVL III: CPT | Mod: PBBFAC,,, | Performed by: NURSE PRACTITIONER

## 2020-02-27 PROCEDURE — 3078F DIAST BP <80 MM HG: CPT | Mod: CPTII,S$GLB,, | Performed by: NURSE PRACTITIONER

## 2020-02-27 PROCEDURE — 99214 PR OFFICE/OUTPT VISIT, EST, LEVL IV, 30-39 MIN: ICD-10-PCS | Mod: S$GLB,,, | Performed by: NURSE PRACTITIONER

## 2020-02-27 PROCEDURE — 99214 OFFICE O/P EST MOD 30 MIN: CPT | Mod: S$GLB,,, | Performed by: INTERNAL MEDICINE

## 2020-02-27 PROCEDURE — 3075F PR MOST RECENT SYSTOLIC BLOOD PRESS GE 130-139MM HG: ICD-10-PCS | Mod: CPTII,S$GLB,, | Performed by: INTERNAL MEDICINE

## 2020-02-27 PROCEDURE — 3008F BODY MASS INDEX DOCD: CPT | Mod: CPTII,S$GLB,, | Performed by: INTERNAL MEDICINE

## 2020-02-27 PROCEDURE — 99999 PR PBB SHADOW E&M-EST. PATIENT-LVL III: ICD-10-PCS | Mod: PBBFAC,,, | Performed by: NURSE PRACTITIONER

## 2020-02-27 PROCEDURE — 3008F PR BODY MASS INDEX (BMI) DOCUMENTED: ICD-10-PCS | Mod: CPTII,S$GLB,, | Performed by: NURSE PRACTITIONER

## 2020-02-27 PROCEDURE — 3078F PR MOST RECENT DIASTOLIC BLOOD PRESSURE < 80 MM HG: ICD-10-PCS | Mod: CPTII,S$GLB,, | Performed by: NURSE PRACTITIONER

## 2020-02-27 PROCEDURE — 99999 PR PBB SHADOW E&M-EST. PATIENT-LVL III: CPT | Mod: PBBFAC,,, | Performed by: INTERNAL MEDICINE

## 2020-02-27 PROCEDURE — 3008F BODY MASS INDEX DOCD: CPT | Mod: CPTII,S$GLB,, | Performed by: NURSE PRACTITIONER

## 2020-02-27 PROCEDURE — 3074F PR MOST RECENT SYSTOLIC BLOOD PRESSURE < 130 MM HG: ICD-10-PCS | Mod: CPTII,S$GLB,, | Performed by: NURSE PRACTITIONER

## 2020-02-27 PROCEDURE — 99999 PR PBB SHADOW E&M-EST. PATIENT-LVL III: ICD-10-PCS | Mod: PBBFAC,,, | Performed by: INTERNAL MEDICINE

## 2020-02-27 PROCEDURE — 99214 OFFICE O/P EST MOD 30 MIN: CPT | Mod: S$GLB,,, | Performed by: NURSE PRACTITIONER

## 2020-02-27 PROCEDURE — 3008F PR BODY MASS INDEX (BMI) DOCUMENTED: ICD-10-PCS | Mod: CPTII,S$GLB,, | Performed by: INTERNAL MEDICINE

## 2020-02-27 RX ORDER — METFORMIN HYDROCHLORIDE 500 MG/1
500 TABLET ORAL 2 TIMES DAILY WITH MEALS
Qty: 180 TABLET | Refills: 3 | Status: SHIPPED | OUTPATIENT
Start: 2020-02-27 | End: 2021-11-30 | Stop reason: SDUPTHER

## 2020-02-27 RX ORDER — LISINOPRIL 10 MG/1
10 TABLET ORAL DAILY
Qty: 90 TABLET | Refills: 3 | Status: SHIPPED | OUTPATIENT
Start: 2020-02-27 | End: 2021-04-12

## 2020-02-27 NOTE — PROGRESS NOTES
Subjective:       Patient ID: Juan Pablo Saeed is a 39 y.o. male.    Chief Complaint: Follow-up    HPI Patient is a 39-year-old male presenting today following up on his recent lab work.  His labs look pretty good overall but there were couple issues.  First off today talking about his blood pressure is numbers are looking better on the lisinopril.  He is taking the medication as prescribed he is not having any adverse effects.  We will continue the treatment at this time.    His labs is show some hyperlipidemia but it is seems to be well controlled with the statin so we will continue his statin therapy at this time.    His hemoglobin A1c was 5.8 demonstrating insulin resistance.  We discussed the diagnosis of insulin resistance and how it is a precursor diabetes.  I recommended the initiation of once daily metformin.  I think he will with his mild elevation of his creatinine which is primarily being driven by his size I think he still tolerate a once daily dose of metformin.  We discussed as well a low-carbohydrate diet.  We discussed the elimination of soft drinks and sweetened beverages.  His wife was here today and she also agreed to this approach.    He continues to have his little mild atypical chest pain. His stress test is scheduled but he did not schedule it until April 7th.  We discussed getting that moved up as an think it is appropriate to wait that long to get that done.    Review of Systems   Constitutional: Negative for fever and unexpected weight change.   Respiratory: Negative for cough, shortness of breath and wheezing.    Cardiovascular: Negative for chest pain and palpitations.   Gastrointestinal: Negative for constipation, diarrhea, nausea and vomiting.   Genitourinary: Negative for dysuria and hematuria.       Objective:   /76   Pulse 76   Temp 98.4 °F (36.9 °C)   Ht 6' (1.829 m)   Wt (!) 173.9 kg (383 lb 6.1 oz)   BMI 52.00 kg/m²      Physical Exam   Constitutional: He is oriented  to person, place, and time. He appears well-developed and well-nourished.   HENT:   Head: Normocephalic and atraumatic.   Eyes: Pupils are equal, round, and reactive to light.   Neck: Neck supple. No thyromegaly present.   Cardiovascular: Normal rate, regular rhythm and normal heart sounds. Exam reveals no gallop and no friction rub.   No murmur heard.  Pulmonary/Chest: Effort normal and breath sounds normal. He has no wheezes. He has no rales.   Abdominal: Soft. Bowel sounds are normal. He exhibits no distension. There is no tenderness.   Neurological: He is alert and oriented to person, place, and time.   Vitals reviewed.      No visits with results within 2 Week(s) from this visit.   Latest known visit with results is:   Lab Visit on 02/03/2020   Component Date Value    WBC 02/03/2020 5.32     RBC 02/03/2020 5.18     Hemoglobin 02/03/2020 13.4*    Hematocrit 02/03/2020 43.6     Mean Corpuscular Volume 02/03/2020 84     Mean Corpuscular Hemoglo* 02/03/2020 25.9*    Mean Corpuscular Hemoglo* 02/03/2020 30.7*    RDW 02/03/2020 14.3     Platelets 02/03/2020 240     MPV 02/03/2020 10.4     Immature Granulocytes 02/03/2020 0.4     Gran # (ANC) 02/03/2020 2.4     Immature Grans (Abs) 02/03/2020 0.02     Lymph # 02/03/2020 2.1     Mono # 02/03/2020 0.7     Eos # 02/03/2020 0.1     Baso # 02/03/2020 0.02     nRBC 02/03/2020 0     Gran% 02/03/2020 45.1     Lymph% 02/03/2020 38.7     Mono% 02/03/2020 13.3     Eosinophil% 02/03/2020 2.1     Basophil% 02/03/2020 0.4     Differential Method 02/03/2020 Automated     Sodium 02/03/2020 139     Potassium 02/03/2020 4.1     Chloride 02/03/2020 103     CO2 02/03/2020 29     Glucose 02/03/2020 88     BUN, Bld 02/03/2020 18     Creatinine 02/03/2020 1.6*    Calcium 02/03/2020 9.8     Total Protein 02/03/2020 7.7     Albumin 02/03/2020 3.8     Total Bilirubin 02/03/2020 0.7     Alkaline Phosphatase 02/03/2020 58     AST 02/03/2020 30     ALT  02/03/2020 33     Anion Gap 02/03/2020 7*    eGFR if African American 02/03/2020 >60.0     eGFR if non African Amer* 02/03/2020 53.1*    Cholesterol 02/03/2020 186     Triglycerides 02/03/2020 255*    HDL 02/03/2020 36*    LDL Cholesterol 02/03/2020 99.0     Hdl/Cholesterol Ratio 02/03/2020 19.4*    Total Cholesterol/HDL Ra* 02/03/2020 5.2*    Non-HDL Cholesterol 02/03/2020 150     Hemoglobin A1C 02/03/2020 5.8*    Estimated Avg Glucose 02/03/2020 120        Assessment:       1. Essential hypertension    2. Mixed hyperlipidemia    3. Insulin resistance    4. Atypical chest pain        Plan:   No problem-specific Assessment & Plan notes found for this encounter.    Essential hypertension  Comments:  stay on bp meds, stable  Orders:  -     lisinopril 10 MG tablet; Take 1 tablet (10 mg total) by mouth once daily.  Dispense: 90 tablet; Refill: 3    Mixed hyperlipidemia  Comments:  continue atorvastatin, stable.    Insulin resistance  Comments:  Start metformin 500 mg once daily, work on weight loss, low carb diet. refer to nutrition  Orders:  -     metFORMIN (GLUCOPHAGE) 500 MG tablet; Take 1 tablet (500 mg total) by mouth 2 (two) times daily with meals.  Dispense: 180 tablet; Refill: 3  -     Ambulatory referral/consult to Nutrition Services; Future; Expected date: 03/05/2020    Atypical chest pain  Comments:  Continue meds, move forward with stress test.          Follow up in about 6 weeks (around 4/9/2020).

## 2020-02-27 NOTE — PROGRESS NOTES
Subjective:      Patient ID: Juan Pablo Saeed is a 39 y.o. male.    Chief Complaint: Sleep Apnea    HPI  Patient presents to the office today for evaluation of sleep apnea.  He started on CPAP 16 cm water pressure a few weeks ago.  He is still habituated, but definitely benefitting from use.  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         Patient Active Problem List   Diagnosis    Essential hypertension    MOSHE (obstructive sleep apnea)    Circadian rhythm sleep disorder, shift work type    Psychophysiological insomnia    Sleep-related bruxism    Behaviorally induced insufficient sleep syndrome    Inadequate sleep hygiene    Idiopathic peripheral neuropathy    Lateral epicondylitis of left elbow    Ganglion cyst of left foot    Proteinuria    Mixed hyperlipidemia    Stage 3 chronic kidney disease    Other hemorrhoids    GERD (gastroesophageal reflux disease)    Morbid obesity with BMI of 50.0-59.9, adult       /76   Pulse 64   Resp 17   Ht 6' (1.829 m)   Wt (!) 174 kg (383 lb 9.6 oz)   SpO2 98%   BMI 52.03 kg/m²   Body mass index is 52.03 kg/m².    Review of Systems   Constitutional: Negative.    HENT: Negative.    Respiratory: Negative.    Cardiovascular: Negative.    Musculoskeletal: Negative.    Gastrointestinal: Negative.    Neurological: Negative.    Psychiatric/Behavioral: Negative.      Objective:      Physical Exam   Constitutional: He is oriented to person, place, and time. He appears well-developed and well-nourished.   obese   HENT:   Head: Normocephalic and atraumatic.   Mallampati Score: IV   Neck: Normal range of motion. Neck supple.   Cardiovascular: Normal rate and regular rhythm.   Pulmonary/Chest: Effort normal and breath sounds normal.   Abdominal: Soft. He exhibits no distension.   Musculoskeletal: He exhibits no edema.   Neurological: He is alert and oriented to person, place, and time.   Skin: Skin is warm  and dry.   Psychiatric: He has a normal mood and affect.     Personal Diagnostic Review  Compliance Information  Juan Pablo Saeed  Device: DreamStation Auto CPAP (500X110) ([id number] V1.1.8.3313)  Summary Report  Patient:  [facility]imes[facility]  Patient ID:  Home Phone:  Age:  [id number]  39  Sleep Doctor:  [facility]/Practice:  Phone:  Fax:  E-Mail Address:  PCP:  LeJeune, Elizabeth Phone:  Clinician:  Randee Tubbs  [address], [place] [id number] [place] [address]  1/27/2020 - 2/25/2020  YOB: 1980  Mask:  Compliance Summary  1/27/2020 - 2/25/2020 (30 days)  Days with Device Usage 30 days  Days without Device Usage 0 days  Percent Days with Device Usage 100.0%  Cumulative Usage 5 days 15 hrs. 43 mins. 24 secs.  Maximum Usage (1 Day) 8 hrs. 17 mins. 37 secs.  Average Usage (All Days) 4 hrs. 31 mins. 26 secs.  Average Usage (Days Used) 4 hrs. 31 mins. 26 secs.  Minimum Usage (1 Day) 1 hrs. 32 mins. 10 secs.  Percent of Days with Usage >= 4 Hours 63.3%  Percent of Days with Usage < 4 Hours 36.7%  Date Range  Average AHI 1.3  Auto-CPAP Summary  Auto-CPAP Mean Pressure 11.5 cmH2O  Auto-CPAP Peak Average Pressure 16.6 cmH2O  Average Device Pressure <= 90% of Time 14.3 cmH2O  Average Time in Large Leak Per Day 1 mins. 18 secs.        Assessment:       1. MOSHE (obstructive sleep apnea)    2. Behaviorally induced insufficient sleep syndrome    3. Circadian rhythm sleep disorder, shift work type    4. Psychophysiological insomnia    5. Morbid obesity with BMI of 50.0-59.9, adult        Outpatient Encounter Medications as of 2/27/2020   Medication Sig Dispense Refill    atorvastatin (LIPITOR) 20 MG tablet Take 1 tablet (20 mg total) by mouth once daily. 30 tablet 3    gabapentin (NEURONTIN) 300 MG capsule Take 1 capsule (300 mg total) by mouth 2 (two) times daily. 60 capsule 11    hydrocortisone (ANUSOL-HC) 25 mg suppository Place 1 suppository (25  mg total) rectally 2 (two) times daily as needed for Hemorrhoids. 30 suppository 0    lisinopril 10 MG tablet Take 1 tablet (10 mg total) by mouth once daily. 30 tablet 11    multivitamin with minerals tablet Take 1 tablet by mouth.      traZODone (DESYREL) 100 MG tablet Take 1 tablet (100 mg total) by mouth every evening. 30 tablet 11    vitamin E 8.9 unit/g Crea Apply to affected area twice daily 112 g 2     No facility-administered encounter medications on file as of 2/27/2020.      No orders of the defined types were placed in this encounter.    Plan:   Continue to increase sleep time.   Weight loss and exercise to improve overall health.  Benefiting from PAP use.     Problem List Items Addressed This Visit        Endocrine    Morbid obesity with BMI of 50.0-59.9, adult       Other    MOSHE (obstructive sleep apnea) - Primary    Overview     AHI 23/hr         Circadian rhythm sleep disorder, shift work type    Psychophysiological insomnia    Behaviorally induced insufficient sleep syndrome

## 2020-03-25 ENCOUNTER — PATIENT MESSAGE (OUTPATIENT)
Dept: INTERNAL MEDICINE | Facility: CLINIC | Age: 40
End: 2020-03-25

## 2020-03-26 ENCOUNTER — HOSPITAL ENCOUNTER (OUTPATIENT)
Dept: CARDIOLOGY | Facility: HOSPITAL | Age: 40
Discharge: HOME | End: 2020-03-26
Attending: INTERNAL MEDICINE
Payer: COMMERCIAL

## 2020-03-26 VITALS
BODY MASS INDEX: 42.66 KG/M2 | SYSTOLIC BLOOD PRESSURE: 120 MMHG | HEIGHT: 72 IN | WEIGHT: 315 LBS | DIASTOLIC BLOOD PRESSURE: 85 MMHG

## 2020-03-26 DIAGNOSIS — R07.89 ATYPICAL CHEST PAIN: ICD-10-CM

## 2020-03-26 LAB
AORTIC ROOT ANNULUS: 3.09 CM
AV INDEX (PROSTH): 0.91
AV MEAN GRADIENT: 3 MMHG
AV PEAK GRADIENT: 5 MMHG
AV VALVE AREA: 2.96 CM2
AV VELOCITY RATIO: 0.85
BSA FOR ECHO PROCEDURE: 2.97 M2
CV ECHO LV RWT: 0.45 CM
CV STRESS BASE HR: 73 BPM
DIASTOLIC BLOOD PRESSURE: 85 MMHG
DOP CALC AO PEAK VEL: 1.11 M/S
DOP CALC AO VTI: 19.44 CM
DOP CALC LVOT AREA: 3.3 CM2
DOP CALC LVOT DIAMETER: 2.04 CM
DOP CALC LVOT PEAK VEL: 0.94 M/S
DOP CALC LVOT STROKE VOLUME: 57.56 CM3
DOP CALCLVOT PEAK VEL VTI: 17.62 CM
E WAVE DECELERATION TIME: 234.84 MSEC
E/A RATIO: 1.49
E/E' RATIO: 7.6 M/S
ECHO LV POSTERIOR WALL: 1.14 CM (ref 0.6–1.1)
FRACTIONAL SHORTENING: 37 % (ref 28–44)
INTERVENTRICULAR SEPTUM: 1.35 CM (ref 0.6–1.1)
IVRT: 86.51 MSEC
LA MAJOR: 4.11 CM
LA MINOR: 4.09 CM
LA WIDTH: 3.44 CM
LEFT ATRIUM SIZE: 3.85 CM
LEFT ATRIUM VOLUME INDEX: 16.4 ML/M2
LEFT ATRIUM VOLUME: 46.16 CM3
LEFT INTERNAL DIMENSION IN SYSTOLE: 3.19 CM (ref 2.1–4)
LEFT VENTRICLE DIASTOLIC VOLUME INDEX: 43.71 ML/M2
LEFT VENTRICLE DIASTOLIC VOLUME: 122.72 ML
LEFT VENTRICLE MASS INDEX: 90 G/M2
LEFT VENTRICLE SYSTOLIC VOLUME INDEX: 14.5 ML/M2
LEFT VENTRICLE SYSTOLIC VOLUME: 40.75 ML
LEFT VENTRICULAR INTERNAL DIMENSION IN DIASTOLE: 5.08 CM (ref 3.5–6)
LEFT VENTRICULAR MASS: 252.45 G
LV LATERAL E/E' RATIO: 6.91 M/S
LV SEPTAL E/E' RATIO: 8.44 M/S
MV PEAK A VEL: 0.51 M/S
MV PEAK E VEL: 0.76 M/S
OHS CV CPX 1 MINUTE RECOVERY HEART RATE: 146 BPM
OHS CV CPX 85 PERCENT MAX PREDICTED HEART RATE MALE: 153
OHS CV CPX MAX PREDICTED HEART RATE: 180
OHS CV CPX PATIENT IS FEMALE: 0
OHS CV CPX PATIENT IS MALE: 1
OHS CV CPX PEAK DIASTOLIC BLOOD PRESSURE: 88 MMHG
OHS CV CPX PEAK HEAR RATE: 151 BPM
OHS CV CPX PEAK RATE PRESSURE PRODUCT: ABNORMAL
OHS CV CPX PEAK SYSTOLIC BLOOD PRESSURE: 161 MMHG
OHS CV CPX PERCENT MAX PREDICTED HEART RATE ACHIEVED: 84
OHS CV CPX RATE PRESSURE PRODUCT PRESENTING: 8760
PISA TR MAX VEL: 2.29 M/S
PV PEAK VELOCITY: 1.1 CM/S
RA MAJOR: 3.74 CM
RA WIDTH: 2.84 CM
RIGHT VENTRICULAR END-DIASTOLIC DIMENSION: 4.13 CM
SINUS: 3.23 CM
STJ: 3.26 CM
SYSTOLIC BLOOD PRESSURE: 120 MMHG
TDI LATERAL: 0.11 M/S
TDI SEPTAL: 0.09 M/S
TDI: 0.1 M/S
TR MAX PG: 21 MMHG
TRICUSPID ANNULAR PLANE SYSTOLIC EXCURSION: 2 CM

## 2020-03-26 PROCEDURE — 93352 ADMIN ECG CONTRAST AGENT: CPT | Mod: ,,, | Performed by: INTERNAL MEDICINE

## 2020-03-26 PROCEDURE — 93352 STRESS ECHO (CUPID ONLY): ICD-10-PCS | Mod: ,,, | Performed by: INTERNAL MEDICINE

## 2020-03-26 PROCEDURE — 93351 STRESS TTE COMPLETE: CPT | Mod: 26,,, | Performed by: INTERNAL MEDICINE

## 2020-03-26 PROCEDURE — 93351 STRESS TTE COMPLETE: CPT

## 2020-03-26 PROCEDURE — 93351 STRESS ECHO (CUPID ONLY): ICD-10-PCS | Mod: 26,,, | Performed by: INTERNAL MEDICINE

## 2020-03-26 RX ORDER — ATROPINE SULFATE 0.1 MG/ML
1 INJECTION INTRAVENOUS ONCE
Status: COMPLETED | OUTPATIENT
Start: 2020-03-26 | End: 2020-03-26

## 2020-03-26 RX ORDER — DOBUTAMINE HYDROCHLORIDE 200 MG/100ML
10 INJECTION INTRAVENOUS CONTINUOUS
Status: DISCONTINUED | OUTPATIENT
Start: 2020-03-26 | End: 2020-04-09

## 2020-03-26 RX ORDER — SODIUM CHLORIDE 0.9 % (FLUSH) 0.9 %
10 SYRINGE (ML) INJECTION
Status: DISCONTINUED | OUTPATIENT
Start: 2020-03-26 | End: 2021-04-12

## 2020-03-26 RX ADMIN — Medication 10 ML: at 11:03

## 2020-03-26 RX ADMIN — ATROPINE SULFATE 1 MG: 0.1 INJECTION INTRAVENOUS at 11:03

## 2020-03-26 RX ADMIN — DOBUTAMINE HYDROCHLORIDE 10 MCG/KG/MIN: 200 INJECTION INTRAVENOUS at 11:03

## 2020-04-06 ENCOUNTER — PATIENT MESSAGE (OUTPATIENT)
Dept: INTERNAL MEDICINE | Facility: CLINIC | Age: 40
End: 2020-04-06

## 2020-04-09 ENCOUNTER — TELEPHONE (OUTPATIENT)
Dept: INTERNAL MEDICINE | Facility: CLINIC | Age: 40
End: 2020-04-09

## 2020-04-09 ENCOUNTER — OFFICE VISIT (OUTPATIENT)
Dept: INTERNAL MEDICINE | Facility: CLINIC | Age: 40
End: 2020-04-09
Payer: COMMERCIAL

## 2020-04-09 VITALS — SYSTOLIC BLOOD PRESSURE: 120 MMHG | RESPIRATION RATE: 16 BRPM | DIASTOLIC BLOOD PRESSURE: 80 MMHG

## 2020-04-09 DIAGNOSIS — I10 ESSENTIAL HYPERTENSION: Primary | ICD-10-CM

## 2020-04-09 DIAGNOSIS — E88.819 INSULIN RESISTANCE: ICD-10-CM

## 2020-04-09 DIAGNOSIS — G47.33 OSA (OBSTRUCTIVE SLEEP APNEA): ICD-10-CM

## 2020-04-09 DIAGNOSIS — R07.89 ATYPICAL CHEST PAIN: ICD-10-CM

## 2020-04-09 PROBLEM — R73.02 IGT (IMPAIRED GLUCOSE TOLERANCE): Status: ACTIVE | Noted: 2020-04-09

## 2020-04-09 PROCEDURE — 99214 PR OFFICE/OUTPT VISIT, EST, LEVL IV, 30-39 MIN: ICD-10-PCS | Mod: 95,,, | Performed by: INTERNAL MEDICINE

## 2020-04-09 PROCEDURE — 99214 OFFICE O/P EST MOD 30 MIN: CPT | Mod: 95,,, | Performed by: INTERNAL MEDICINE

## 2020-04-09 PROCEDURE — 3074F SYST BP LT 130 MM HG: CPT | Mod: CPTII,,, | Performed by: INTERNAL MEDICINE

## 2020-04-09 PROCEDURE — 3079F DIAST BP 80-89 MM HG: CPT | Mod: CPTII,,, | Performed by: INTERNAL MEDICINE

## 2020-04-09 PROCEDURE — 3074F PR MOST RECENT SYSTOLIC BLOOD PRESSURE < 130 MM HG: ICD-10-PCS | Mod: CPTII,,, | Performed by: INTERNAL MEDICINE

## 2020-04-09 PROCEDURE — 3079F PR MOST RECENT DIASTOLIC BLOOD PRESSURE 80-89 MM HG: ICD-10-PCS | Mod: CPTII,,, | Performed by: INTERNAL MEDICINE

## 2020-04-09 NOTE — PROGRESS NOTES
Subjective:       Patient ID: Juan Pablo Saeed is a 39 y.o. male.    Chief Complaint: No chief complaint on file.    HPI   Patient is a 39-year-old male presenting today following up on his hypertension, insulin resistance, sleep apnea.  At his last visit his blood pressure had been doing well.  He has been taking lisinopril.  He continues take his medication as prescribed.  He monitors blood pressures at home.  He states that on average they have done very well his most recent reading was 120/80.    He was recently diagnosed with insulin resistance.  We started him on metformin.  He has noted no month major problems with the medication.  He does see a little bit more looseness to his stools but that is not problematic for him.  He has been working on trying to be little bit more focused about diet as well.    He has a history of sleep apnea.  He wears a CPAP nightly and says it does very well for him.    At his last visit he had mentioned some atypical type chest pain.  We recommended doing some workup.  He had a dobutamine stress test and did very well with that there was no evidence of ischemic changes.  We discussed continued focus on lifestyle modification with weight loss and low-fat low-carbohydrate diet.    The patient location is: home  The chief complaint leading to consultation is: follow up insulin resistance  Visit type: Virtual visit with synchronous audio and video    Each patient to whom he or she provides medical services by telemedicine is:  (1) informed of the relationship between the physician and patient and the respective role of any other health care provider with respect to management of the patient; and (2) notified that he or she may decline to receive medical services by telemedicine and may withdraw from such care at any time.      Review of Systems   Constitutional: Negative for activity change and unexpected weight change.   HENT: Negative for hearing loss, rhinorrhea and trouble  swallowing.    Eyes: Negative for discharge and visual disturbance.   Respiratory: Negative for chest tightness and wheezing.    Cardiovascular: Positive for chest pain and palpitations.   Gastrointestinal: Negative for blood in stool, constipation, diarrhea and vomiting.   Endocrine: Negative for polydipsia and polyuria.   Genitourinary: Negative for difficulty urinating, hematuria and urgency.   Musculoskeletal: Negative for arthralgias, joint swelling and neck pain.   Neurological: Negative for weakness and headaches.   Psychiatric/Behavioral: Positive for dysphoric mood. Negative for confusion.       Objective:   /80   Resp 16      Physical Exam   Constitutional: He is oriented to person, place, and time. He appears well-developed and well-nourished. No distress.   HENT:   Head: Normocephalic and atraumatic.   Eyes: EOM are normal.   Neck: Normal range of motion.   Pulmonary/Chest: Effort normal. No respiratory distress.   Neurological: He is alert and oriented to person, place, and time. No cranial nerve deficit.   Skin: No rash noted.   Psychiatric: He has a normal mood and affect. His behavior is normal. Thought content normal.       No visits with results within 2 Week(s) from this visit.   Latest known visit with results is:   Hospital Outpatient Visit on 03/26/2020   Component Date Value    BSA 03/26/2020 2.97     TDI SEPTAL 03/26/2020 0.09     LV LATERAL E/E' RATIO 03/26/2020 6.91     LV SEPTAL E/E' RATIO 03/26/2020 8.44     LA WIDTH 03/26/2020 3.44     Systolic blood pressure 03/26/2020 120     Diastolic blood pressure 03/26/2020 85     HR at rest 03/26/2020 73     RPP 03/26/2020 8,760     Peak HR 03/26/2020 151     Peak Systolic BP 03/26/2020 161     Peak Diatolic BP 03/26/2020 88     Peak RPP 03/26/2020 24,311     Max Predicted HR 03/26/2020 180     85% Max Predicted HR 03/26/2020 153     % Max HR Achieved 03/26/2020 84     1 Minute Recovery HR 03/26/2020 146     [facility] CV CPX  PATIENT IS MA* 03/26/2020 1     [facility] CV CPX PATIENT IS FE* 03/26/2020 0     TDI LATERAL 03/26/2020 0.11     PV PEAK VELOCITY 03/26/2020 1.10     LVIDD 03/26/2020 5.08     IVS 03/26/2020 1.35*    PW 03/26/2020 1.14*    Ao root annulus 03/26/2020 3.09     LVIDS 03/26/2020 3.19     FS 03/26/2020 37     LA volume 03/26/2020 46.16     Sinus 03/26/2020 3.23     STJ 03/26/2020 3.26     LV mass 03/26/2020 252.45     LA size 03/26/2020 3.85     RVDD 03/26/2020 4.13     TAPSE 03/26/2020 2.00     Left Ventricle Relative * 03/26/2020 0.45     AV mean gradient 03/26/2020 3     AV valve area 03/26/2020 2.96     AV Velocity Ratio 03/26/2020 0.85     AV index (prosthetic) 03/26/2020 0.91     E/A ratio 03/26/2020 1.49     Mean e' 03/26/2020 0.10     E wave decelartion time 03/26/2020 234.84     IVRT 03/26/2020 86.51     LVOT diameter 03/26/2020 2.04     LVOT area 03/26/2020 3.3     LVOT peak joseph 03/26/2020 0.94     LVOT peak VTI 03/26/2020 17.62     Ao peak joseph 03/26/2020 1.11     Ao VTI 03/26/2020 19.44     LVOT stroke volume 03/26/2020 57.56     AV peak gradient 03/26/2020 5     E/E' ratio 03/26/2020 7.60     MV Peak E Joseph 03/26/2020 0.76     TR Max Joseph 03/26/2020 2.29     MV Peak A Joseph 03/26/2020 0.51     LV Systolic Volume 03/26/2020 40.75     LV Systolic Volume Index 03/26/2020 14.5     LV Diastolic Volume 03/26/2020 122.72     LV Diastolic Volume Index 03/26/2020 43.71     LA Volume Index 03/26/2020 16.4     LV Mass Index 03/26/2020 90     RA Major Axis 03/26/2020 3.74     Left Atrium Minor Axis 03/26/2020 4.09     Left Atrium Major Axis 03/26/2020 4.11     Triscuspid Valve Regurgi* 03/26/2020 21     RA Width 03/26/2020 2.84        Assessment:       1. Essential hypertension    2. Insulin resistance    3. MOSHE (obstructive sleep apnea)    4. Atypical chest pain        Plan:   MOSHE (obstructive sleep apnea)  Patient is compliant with use of cpap, using it the majority of nights.   Benefit from the use of the device is noted.      Essential hypertension  Comments:  Doing well.  No problems with lisinopril.    Insulin resistance  Comments:  Tolerating metformin well.  Will look at updating labs in 2 months  Orders:  -     Hemoglobin A1c; Future; Expected date: 06/09/2020    MOSHE (obstructive sleep apnea)    Atypical chest pain  Comments:  Negative dobutamine stress test          Follow up in about 9 weeks (around 6/11/2020).

## 2020-04-09 NOTE — TELEPHONE ENCOUNTER
----- Message from Kierra Mandel sent at 4/9/2020  8:56 AM CDT -----  Contact: Pt  Pt is requesting call back in regards to having technical diffculities with appt and would like to know if he can do phone visit this afternoon if aretha isn't working.          Pls call back at [phone]

## 2020-07-09 DIAGNOSIS — E78.5 HYPERLIPIDEMIA, UNSPECIFIED HYPERLIPIDEMIA TYPE: ICD-10-CM

## 2020-07-10 RX ORDER — ATORVASTATIN CALCIUM 20 MG/1
20 TABLET, FILM COATED ORAL DAILY
Qty: 30 TABLET | Refills: 3 | Status: SHIPPED | OUTPATIENT
Start: 2020-07-10 | End: 2020-11-05 | Stop reason: SDUPTHER

## 2020-10-17 ENCOUNTER — PATIENT MESSAGE (OUTPATIENT)
Dept: INTERNAL MEDICINE | Facility: CLINIC | Age: 40
End: 2020-10-17

## 2020-11-05 DIAGNOSIS — E78.5 HYPERLIPIDEMIA, UNSPECIFIED HYPERLIPIDEMIA TYPE: ICD-10-CM

## 2020-11-05 RX ORDER — ATORVASTATIN CALCIUM 20 MG/1
20 TABLET, FILM COATED ORAL DAILY
Qty: 30 TABLET | Refills: 3 | Status: SHIPPED | OUTPATIENT
Start: 2020-11-05 | End: 2021-05-06 | Stop reason: SDUPTHER

## 2020-11-29 ENCOUNTER — PATIENT MESSAGE (OUTPATIENT)
Dept: INTERNAL MEDICINE | Facility: CLINIC | Age: 40
End: 2020-11-29

## 2020-11-30 ENCOUNTER — PATIENT MESSAGE (OUTPATIENT)
Dept: PEDIATRICS | Facility: CLINIC | Age: 40
End: 2020-11-30

## 2020-11-30 RX ORDER — HYDROCORTISONE ACETATE 25 MG/1
25 SUPPOSITORY RECTAL 2 TIMES DAILY PRN
Qty: 30 SUPPOSITORY | Refills: 0 | Status: SHIPPED | OUTPATIENT
Start: 2020-11-30 | End: 2021-04-12

## 2021-01-30 ENCOUNTER — PATIENT MESSAGE (OUTPATIENT)
Dept: ADMINISTRATIVE | Facility: OTHER | Age: 41
End: 2021-01-30

## 2021-04-12 ENCOUNTER — OFFICE VISIT (OUTPATIENT)
Dept: INTERNAL MEDICINE | Facility: CLINIC | Age: 41
End: 2021-04-12
Payer: COMMERCIAL

## 2021-04-12 VITALS
HEART RATE: 76 BPM | SYSTOLIC BLOOD PRESSURE: 134 MMHG | TEMPERATURE: 98 F | WEIGHT: 315 LBS | DIASTOLIC BLOOD PRESSURE: 88 MMHG | BODY MASS INDEX: 42.66 KG/M2 | HEIGHT: 72 IN

## 2021-04-12 DIAGNOSIS — E66.01 MORBID OBESITY WITH BMI OF 50.0-59.9, ADULT: ICD-10-CM

## 2021-04-12 DIAGNOSIS — Z00.00 ROUTINE GENERAL MEDICAL EXAMINATION AT HEALTH CARE FACILITY: Primary | ICD-10-CM

## 2021-04-12 DIAGNOSIS — G47.33 OSA (OBSTRUCTIVE SLEEP APNEA): ICD-10-CM

## 2021-04-12 DIAGNOSIS — I10 ESSENTIAL HYPERTENSION: ICD-10-CM

## 2021-04-12 DIAGNOSIS — K21.9 GASTROESOPHAGEAL REFLUX DISEASE WITHOUT ESOPHAGITIS: ICD-10-CM

## 2021-04-12 DIAGNOSIS — E88.819 INSULIN RESISTANCE: ICD-10-CM

## 2021-04-12 DIAGNOSIS — G60.9 IDIOPATHIC PERIPHERAL NEUROPATHY: ICD-10-CM

## 2021-04-12 DIAGNOSIS — E78.2 MIXED HYPERLIPIDEMIA: ICD-10-CM

## 2021-04-12 PROCEDURE — 99396 PR PREVENTIVE VISIT,EST,40-64: ICD-10-PCS | Mod: S$GLB,,, | Performed by: INTERNAL MEDICINE

## 2021-04-12 PROCEDURE — 99999 PR PBB SHADOW E&M-EST. PATIENT-LVL IV: CPT | Mod: PBBFAC,,, | Performed by: INTERNAL MEDICINE

## 2021-04-12 PROCEDURE — 99396 PREV VISIT EST AGE 40-64: CPT | Mod: S$GLB,,, | Performed by: INTERNAL MEDICINE

## 2021-04-12 PROCEDURE — 3008F PR BODY MASS INDEX (BMI) DOCUMENTED: ICD-10-PCS | Mod: CPTII,S$GLB,, | Performed by: INTERNAL MEDICINE

## 2021-04-12 PROCEDURE — 99999 PR PBB SHADOW E&M-EST. PATIENT-LVL IV: ICD-10-PCS | Mod: PBBFAC,,, | Performed by: INTERNAL MEDICINE

## 2021-04-12 PROCEDURE — 1125F AMNT PAIN NOTED PAIN PRSNT: CPT | Mod: S$GLB,,, | Performed by: INTERNAL MEDICINE

## 2021-04-12 PROCEDURE — 3008F BODY MASS INDEX DOCD: CPT | Mod: CPTII,S$GLB,, | Performed by: INTERNAL MEDICINE

## 2021-04-12 PROCEDURE — 1125F PR PAIN SEVERITY QUANTIFIED, PAIN PRESENT: ICD-10-PCS | Mod: S$GLB,,, | Performed by: INTERNAL MEDICINE

## 2021-04-12 RX ORDER — PREDNISONE 50 MG/1
50 TABLET ORAL DAILY
COMMUNITY
Start: 2021-03-09 | End: 2022-03-29

## 2021-04-12 RX ORDER — IBUPROFEN 800 MG/1
800 TABLET ORAL 3 TIMES DAILY
COMMUNITY
Start: 2021-04-08 | End: 2022-08-18

## 2021-04-12 RX ORDER — LISINOPRIL 20 MG/1
20 TABLET ORAL DAILY
Qty: 90 TABLET | Refills: 3 | Status: SHIPPED | OUTPATIENT
Start: 2021-04-12 | End: 2022-03-11

## 2021-04-12 RX ORDER — PANTOPRAZOLE SODIUM 40 MG/1
40 TABLET, DELAYED RELEASE ORAL DAILY
Qty: 30 TABLET | Refills: 11 | Status: SHIPPED | OUTPATIENT
Start: 2021-04-12 | End: 2022-03-29

## 2021-04-12 RX ORDER — OXYCODONE AND ACETAMINOPHEN 10; 325 MG/1; MG/1
1 TABLET ORAL 2 TIMES DAILY PRN
COMMUNITY
Start: 2021-03-09 | End: 2022-04-18

## 2021-04-14 ENCOUNTER — LAB VISIT (OUTPATIENT)
Dept: LAB | Facility: HOSPITAL | Age: 41
End: 2021-04-14
Attending: INTERNAL MEDICINE
Payer: COMMERCIAL

## 2021-04-14 DIAGNOSIS — Z00.00 ROUTINE GENERAL MEDICAL EXAMINATION AT HEALTH CARE FACILITY: ICD-10-CM

## 2021-04-14 LAB
ALBUMIN SERPL BCP-MCNC: 3.6 G/DL (ref 3.5–5.2)
ALP SERPL-CCNC: 54 U/L (ref 55–135)
ALT SERPL W/O P-5'-P-CCNC: 27 U/L (ref 10–44)
ANION GAP SERPL CALC-SCNC: 8 MMOL/L (ref 8–16)
AST SERPL-CCNC: 21 U/L (ref 10–40)
BASOPHILS # BLD AUTO: 0.04 K/UL (ref 0–0.2)
BASOPHILS NFR BLD: 0.7 % (ref 0–1.9)
BILIRUB SERPL-MCNC: 0.8 MG/DL (ref 0.1–1)
BUN SERPL-MCNC: 16 MG/DL (ref 6–20)
CALCIUM SERPL-MCNC: 9.4 MG/DL (ref 8.7–10.5)
CHLORIDE SERPL-SCNC: 105 MMOL/L (ref 95–110)
CHOLEST SERPL-MCNC: 196 MG/DL (ref 120–199)
CHOLEST/HDLC SERPL: 5.3 {RATIO} (ref 2–5)
CO2 SERPL-SCNC: 27 MMOL/L (ref 23–29)
CREAT SERPL-MCNC: 1.5 MG/DL (ref 0.5–1.4)
DIFFERENTIAL METHOD: ABNORMAL
EOSINOPHIL # BLD AUTO: 0.1 K/UL (ref 0–0.5)
EOSINOPHIL NFR BLD: 2.1 % (ref 0–8)
ERYTHROCYTE [DISTWIDTH] IN BLOOD BY AUTOMATED COUNT: 13.9 % (ref 11.5–14.5)
EST. GFR  (AFRICAN AMERICAN): >60 ML/MIN/1.73 M^2
EST. GFR  (NON AFRICAN AMERICAN): 57 ML/MIN/1.73 M^2
ESTIMATED AVG GLUCOSE: 117 MG/DL (ref 68–131)
GLUCOSE SERPL-MCNC: 96 MG/DL (ref 70–110)
HBA1C MFR BLD: 5.7 % (ref 4–5.6)
HCT VFR BLD AUTO: 43 % (ref 40–54)
HDLC SERPL-MCNC: 37 MG/DL (ref 40–75)
HDLC SERPL: 18.9 % (ref 20–50)
HGB BLD-MCNC: 13.9 G/DL (ref 14–18)
IMM GRANULOCYTES # BLD AUTO: 0.04 K/UL (ref 0–0.04)
IMM GRANULOCYTES NFR BLD AUTO: 0.7 % (ref 0–0.5)
LDLC SERPL CALC-MCNC: 115.4 MG/DL (ref 63–159)
LYMPHOCYTES # BLD AUTO: 2 K/UL (ref 1–4.8)
LYMPHOCYTES NFR BLD: 32.1 % (ref 18–48)
MCH RBC QN AUTO: 26.3 PG (ref 27–31)
MCHC RBC AUTO-ENTMCNC: 32.3 G/DL (ref 32–36)
MCV RBC AUTO: 81 FL (ref 82–98)
MONOCYTES # BLD AUTO: 0.6 K/UL (ref 0.3–1)
MONOCYTES NFR BLD: 10.5 % (ref 4–15)
NEUTROPHILS # BLD AUTO: 3.3 K/UL (ref 1.8–7.7)
NEUTROPHILS NFR BLD: 53.9 % (ref 38–73)
NONHDLC SERPL-MCNC: 159 MG/DL
NRBC BLD-RTO: 0 /100 WBC
PLATELET # BLD AUTO: 252 K/UL (ref 150–450)
PMV BLD AUTO: 10.5 FL (ref 9.2–12.9)
POTASSIUM SERPL-SCNC: 4.1 MMOL/L (ref 3.5–5.1)
PROT SERPL-MCNC: 7.5 G/DL (ref 6–8.4)
RBC # BLD AUTO: 5.28 M/UL (ref 4.6–6.2)
SODIUM SERPL-SCNC: 140 MMOL/L (ref 136–145)
TRIGL SERPL-MCNC: 218 MG/DL (ref 30–150)
WBC # BLD AUTO: 6.07 K/UL (ref 3.9–12.7)

## 2021-04-14 PROCEDURE — 83036 HEMOGLOBIN GLYCOSYLATED A1C: CPT | Performed by: INTERNAL MEDICINE

## 2021-04-14 PROCEDURE — 80061 LIPID PANEL: CPT | Performed by: INTERNAL MEDICINE

## 2021-04-14 PROCEDURE — 80053 COMPREHEN METABOLIC PANEL: CPT | Performed by: INTERNAL MEDICINE

## 2021-04-14 PROCEDURE — 85025 COMPLETE CBC W/AUTO DIFF WBC: CPT | Performed by: INTERNAL MEDICINE

## 2021-04-14 PROCEDURE — 36415 COLL VENOUS BLD VENIPUNCTURE: CPT | Mod: PO | Performed by: INTERNAL MEDICINE

## 2021-04-15 ENCOUNTER — PATIENT MESSAGE (OUTPATIENT)
Dept: INTERNAL MEDICINE | Facility: CLINIC | Age: 41
End: 2021-04-15

## 2021-04-15 DIAGNOSIS — N18.30 STAGE 3 CHRONIC KIDNEY DISEASE, UNSPECIFIED WHETHER STAGE 3A OR 3B CKD: Primary | ICD-10-CM

## 2021-04-22 ENCOUNTER — TELEPHONE (OUTPATIENT)
Dept: INTERNAL MEDICINE | Facility: CLINIC | Age: 41
End: 2021-04-22

## 2021-04-22 DIAGNOSIS — E66.01 MORBID OBESITY WITH BMI OF 50.0-59.9, ADULT: Primary | ICD-10-CM

## 2021-06-14 ENCOUNTER — OFFICE VISIT (OUTPATIENT)
Dept: INTERNAL MEDICINE | Facility: CLINIC | Age: 41
End: 2021-06-14
Payer: COMMERCIAL

## 2021-06-14 VITALS
BODY MASS INDEX: 42.66 KG/M2 | DIASTOLIC BLOOD PRESSURE: 86 MMHG | WEIGHT: 315 LBS | SYSTOLIC BLOOD PRESSURE: 124 MMHG | TEMPERATURE: 97 F | HEIGHT: 72 IN | HEART RATE: 72 BPM

## 2021-06-14 DIAGNOSIS — I10 ESSENTIAL HYPERTENSION: Primary | ICD-10-CM

## 2021-06-14 DIAGNOSIS — G60.9 IDIOPATHIC PERIPHERAL NEUROPATHY: ICD-10-CM

## 2021-06-14 DIAGNOSIS — E88.819 INSULIN RESISTANCE: ICD-10-CM

## 2021-06-14 DIAGNOSIS — E78.2 MIXED HYPERLIPIDEMIA: ICD-10-CM

## 2021-06-14 DIAGNOSIS — G47.33 OSA (OBSTRUCTIVE SLEEP APNEA): ICD-10-CM

## 2021-06-14 PROBLEM — N18.30 STAGE 3 CHRONIC KIDNEY DISEASE: Status: RESOLVED | Noted: 2018-03-21 | Resolved: 2021-06-14

## 2021-06-14 PROCEDURE — 99999 PR PBB SHADOW E&M-EST. PATIENT-LVL IV: CPT | Mod: PBBFAC,,, | Performed by: INTERNAL MEDICINE

## 2021-06-14 PROCEDURE — 1125F PR PAIN SEVERITY QUANTIFIED, PAIN PRESENT: ICD-10-PCS | Mod: S$GLB,,, | Performed by: INTERNAL MEDICINE

## 2021-06-14 PROCEDURE — 99999 PR PBB SHADOW E&M-EST. PATIENT-LVL IV: ICD-10-PCS | Mod: PBBFAC,,, | Performed by: INTERNAL MEDICINE

## 2021-06-14 PROCEDURE — 3079F PR MOST RECENT DIASTOLIC BLOOD PRESSURE 80-89 MM HG: ICD-10-PCS | Mod: CPTII,S$GLB,, | Performed by: INTERNAL MEDICINE

## 2021-06-14 PROCEDURE — 1125F AMNT PAIN NOTED PAIN PRSNT: CPT | Mod: S$GLB,,, | Performed by: INTERNAL MEDICINE

## 2021-06-14 PROCEDURE — 99214 OFFICE O/P EST MOD 30 MIN: CPT | Mod: S$GLB,,, | Performed by: INTERNAL MEDICINE

## 2021-06-14 PROCEDURE — 99214 PR OFFICE/OUTPT VISIT, EST, LEVL IV, 30-39 MIN: ICD-10-PCS | Mod: S$GLB,,, | Performed by: INTERNAL MEDICINE

## 2021-06-14 PROCEDURE — 3074F PR MOST RECENT SYSTOLIC BLOOD PRESSURE < 130 MM HG: ICD-10-PCS | Mod: CPTII,S$GLB,, | Performed by: INTERNAL MEDICINE

## 2021-06-14 PROCEDURE — 3008F BODY MASS INDEX DOCD: CPT | Mod: CPTII,S$GLB,, | Performed by: INTERNAL MEDICINE

## 2021-06-14 PROCEDURE — 3074F SYST BP LT 130 MM HG: CPT | Mod: CPTII,S$GLB,, | Performed by: INTERNAL MEDICINE

## 2021-06-14 PROCEDURE — 3008F PR BODY MASS INDEX (BMI) DOCUMENTED: ICD-10-PCS | Mod: CPTII,S$GLB,, | Performed by: INTERNAL MEDICINE

## 2021-06-14 PROCEDURE — 3079F DIAST BP 80-89 MM HG: CPT | Mod: CPTII,S$GLB,, | Performed by: INTERNAL MEDICINE

## 2021-08-01 ENCOUNTER — PATIENT MESSAGE (OUTPATIENT)
Dept: INTERNAL MEDICINE | Facility: CLINIC | Age: 41
End: 2021-08-01

## 2021-08-01 DIAGNOSIS — Z20.822 EXPOSURE TO COVID-19 VIRUS: Primary | ICD-10-CM

## 2021-08-02 ENCOUNTER — LAB VISIT (OUTPATIENT)
Dept: INTERNAL MEDICINE | Facility: CLINIC | Age: 41
End: 2021-08-02
Payer: COMMERCIAL

## 2021-08-02 DIAGNOSIS — Z20.822 EXPOSURE TO COVID-19 VIRUS: ICD-10-CM

## 2021-08-02 PROCEDURE — U0005 INFEC AGEN DETEC AMPLI PROBE: HCPCS | Performed by: INTERNAL MEDICINE

## 2021-08-02 PROCEDURE — U0003 INFECTIOUS AGENT DETECTION BY NUCLEIC ACID (DNA OR RNA); SEVERE ACUTE RESPIRATORY SYNDROME CORONAVIRUS 2 (SARS-COV-2) (CORONAVIRUS DISEASE [COVID-19]), AMPLIFIED PROBE TECHNIQUE, MAKING USE OF HIGH THROUGHPUT TECHNOLOGIES AS DESCRIBED BY CMS-2020-01-R: HCPCS | Performed by: INTERNAL MEDICINE

## 2021-08-03 LAB
SARS-COV-2 RNA RESP QL NAA+PROBE: NOT DETECTED
SARS-COV-2- CYCLE NUMBER: -1

## 2021-09-10 ENCOUNTER — PATIENT OUTREACH (OUTPATIENT)
Dept: ADMINISTRATIVE | Facility: OTHER | Age: 41
End: 2021-09-10

## 2021-09-11 ENCOUNTER — LAB VISIT (OUTPATIENT)
Dept: LAB | Facility: HOSPITAL | Age: 41
End: 2021-09-11
Attending: INTERNAL MEDICINE
Payer: COMMERCIAL

## 2021-09-11 DIAGNOSIS — N18.30 STAGE 3 CHRONIC KIDNEY DISEASE, UNSPECIFIED WHETHER STAGE 3A OR 3B CKD: ICD-10-CM

## 2021-09-11 LAB
ALBUMIN SERPL BCP-MCNC: 3.9 G/DL (ref 3.5–5.2)
ANION GAP SERPL CALC-SCNC: 10 MMOL/L (ref 8–16)
BASOPHILS # BLD AUTO: 0.03 K/UL (ref 0–0.2)
BASOPHILS NFR BLD: 0.7 % (ref 0–1.9)
BUN SERPL-MCNC: 20 MG/DL (ref 6–20)
CALCIUM SERPL-MCNC: 9.6 MG/DL (ref 8.7–10.5)
CHLORIDE SERPL-SCNC: 106 MMOL/L (ref 95–110)
CO2 SERPL-SCNC: 23 MMOL/L (ref 23–29)
CREAT SERPL-MCNC: 1.7 MG/DL (ref 0.5–1.4)
CREAT UR-MCNC: 164 MG/DL (ref 23–375)
DIFFERENTIAL METHOD: ABNORMAL
EOSINOPHIL # BLD AUTO: 0.1 K/UL (ref 0–0.5)
EOSINOPHIL NFR BLD: 1.8 % (ref 0–8)
ERYTHROCYTE [DISTWIDTH] IN BLOOD BY AUTOMATED COUNT: 13.9 % (ref 11.5–14.5)
EST. GFR  (AFRICAN AMERICAN): 57 ML/MIN/1.73 M^2
EST. GFR  (NON AFRICAN AMERICAN): 49 ML/MIN/1.73 M^2
GLUCOSE SERPL-MCNC: 96 MG/DL (ref 70–110)
HCT VFR BLD AUTO: 40.7 % (ref 40–54)
HGB BLD-MCNC: 13.6 G/DL (ref 14–18)
IMM GRANULOCYTES # BLD AUTO: 0.02 K/UL (ref 0–0.04)
IMM GRANULOCYTES NFR BLD AUTO: 0.4 % (ref 0–0.5)
LYMPHOCYTES # BLD AUTO: 1.7 K/UL (ref 1–4.8)
LYMPHOCYTES NFR BLD: 37.6 % (ref 18–48)
MCH RBC QN AUTO: 27.4 PG (ref 27–31)
MCHC RBC AUTO-ENTMCNC: 33.4 G/DL (ref 32–36)
MCV RBC AUTO: 82 FL (ref 82–98)
MONOCYTES # BLD AUTO: 0.6 K/UL (ref 0.3–1)
MONOCYTES NFR BLD: 12.5 % (ref 4–15)
NEUTROPHILS # BLD AUTO: 2.2 K/UL (ref 1.8–7.7)
NEUTROPHILS NFR BLD: 47 % (ref 38–73)
NRBC BLD-RTO: 0 /100 WBC
PHOSPHATE SERPL-MCNC: 3.1 MG/DL (ref 2.7–4.5)
PLATELET # BLD AUTO: 241 K/UL (ref 150–450)
PMV BLD AUTO: 9.8 FL (ref 9.2–12.9)
POTASSIUM SERPL-SCNC: 4.2 MMOL/L (ref 3.5–5.1)
PROT UR-MCNC: 10 MG/DL (ref 0–15)
PROT/CREAT UR: 0.06 MG/G{CREAT} (ref 0–0.2)
PTH-INTACT SERPL-MCNC: 58 PG/ML (ref 9–77)
RBC # BLD AUTO: 4.96 M/UL (ref 4.6–6.2)
SODIUM SERPL-SCNC: 139 MMOL/L (ref 136–145)
WBC # BLD AUTO: 4.57 K/UL (ref 3.9–12.7)

## 2021-09-11 PROCEDURE — 83970 ASSAY OF PARATHORMONE: CPT | Performed by: INTERNAL MEDICINE

## 2021-09-11 PROCEDURE — 80069 RENAL FUNCTION PANEL: CPT | Performed by: INTERNAL MEDICINE

## 2021-09-11 PROCEDURE — 84156 ASSAY OF PROTEIN URINE: CPT | Performed by: INTERNAL MEDICINE

## 2021-09-11 PROCEDURE — 85025 COMPLETE CBC W/AUTO DIFF WBC: CPT | Performed by: INTERNAL MEDICINE

## 2021-09-11 PROCEDURE — 36415 COLL VENOUS BLD VENIPUNCTURE: CPT | Performed by: INTERNAL MEDICINE

## 2021-09-13 ENCOUNTER — OFFICE VISIT (OUTPATIENT)
Dept: NEPHROLOGY | Facility: CLINIC | Age: 41
End: 2021-09-13
Payer: COMMERCIAL

## 2021-09-13 VITALS
WEIGHT: 315 LBS | HEIGHT: 72 IN | SYSTOLIC BLOOD PRESSURE: 128 MMHG | DIASTOLIC BLOOD PRESSURE: 70 MMHG | BODY MASS INDEX: 42.66 KG/M2 | HEART RATE: 80 BPM

## 2021-09-13 DIAGNOSIS — N18.31 STAGE 3A CHRONIC KIDNEY DISEASE: Primary | ICD-10-CM

## 2021-09-13 DIAGNOSIS — I12.9 PARENCHYMAL RENAL HYPERTENSION, STAGE 1 THROUGH STAGE 4 OR UNSPECIFIED CHRONIC KIDNEY DISEASE: ICD-10-CM

## 2021-09-13 PROCEDURE — 3074F SYST BP LT 130 MM HG: CPT | Mod: CPTII,S$GLB,, | Performed by: INTERNAL MEDICINE

## 2021-09-13 PROCEDURE — 99214 PR OFFICE/OUTPT VISIT, EST, LEVL IV, 30-39 MIN: ICD-10-PCS | Mod: S$GLB,,, | Performed by: INTERNAL MEDICINE

## 2021-09-13 PROCEDURE — 99999 PR PBB SHADOW E&M-EST. PATIENT-LVL III: CPT | Mod: PBBFAC,,, | Performed by: INTERNAL MEDICINE

## 2021-09-13 PROCEDURE — 4010F PR ACE/ARB THEARPY RXD/TAKEN: ICD-10-PCS | Mod: CPTII,S$GLB,, | Performed by: INTERNAL MEDICINE

## 2021-09-13 PROCEDURE — 3066F PR DOCUMENTATION OF TREATMENT FOR NEPHROPATHY: ICD-10-PCS | Mod: CPTII,S$GLB,, | Performed by: INTERNAL MEDICINE

## 2021-09-13 PROCEDURE — 1159F MED LIST DOCD IN RCRD: CPT | Mod: CPTII,S$GLB,, | Performed by: INTERNAL MEDICINE

## 2021-09-13 PROCEDURE — 3066F NEPHROPATHY DOC TX: CPT | Mod: CPTII,S$GLB,, | Performed by: INTERNAL MEDICINE

## 2021-09-13 PROCEDURE — 3074F PR MOST RECENT SYSTOLIC BLOOD PRESSURE < 130 MM HG: ICD-10-PCS | Mod: CPTII,S$GLB,, | Performed by: INTERNAL MEDICINE

## 2021-09-13 PROCEDURE — 3078F PR MOST RECENT DIASTOLIC BLOOD PRESSURE < 80 MM HG: ICD-10-PCS | Mod: CPTII,S$GLB,, | Performed by: INTERNAL MEDICINE

## 2021-09-13 PROCEDURE — 3078F DIAST BP <80 MM HG: CPT | Mod: CPTII,S$GLB,, | Performed by: INTERNAL MEDICINE

## 2021-09-13 PROCEDURE — 4010F ACE/ARB THERAPY RXD/TAKEN: CPT | Mod: CPTII,S$GLB,, | Performed by: INTERNAL MEDICINE

## 2021-09-13 PROCEDURE — 3044F HG A1C LEVEL LT 7.0%: CPT | Mod: CPTII,S$GLB,, | Performed by: INTERNAL MEDICINE

## 2021-09-13 PROCEDURE — 1160F PR REVIEW ALL MEDS BY PRESCRIBER/CLIN PHARMACIST DOCUMENTED: ICD-10-PCS | Mod: CPTII,S$GLB,, | Performed by: INTERNAL MEDICINE

## 2021-09-13 PROCEDURE — 1159F PR MEDICATION LIST DOCUMENTED IN MEDICAL RECORD: ICD-10-PCS | Mod: CPTII,S$GLB,, | Performed by: INTERNAL MEDICINE

## 2021-09-13 PROCEDURE — 3008F PR BODY MASS INDEX (BMI) DOCUMENTED: ICD-10-PCS | Mod: CPTII,S$GLB,, | Performed by: INTERNAL MEDICINE

## 2021-09-13 PROCEDURE — 99214 OFFICE O/P EST MOD 30 MIN: CPT | Mod: S$GLB,,, | Performed by: INTERNAL MEDICINE

## 2021-09-13 PROCEDURE — 99999 PR PBB SHADOW E&M-EST. PATIENT-LVL III: ICD-10-PCS | Mod: PBBFAC,,, | Performed by: INTERNAL MEDICINE

## 2021-09-13 PROCEDURE — 1160F RVW MEDS BY RX/DR IN RCRD: CPT | Mod: CPTII,S$GLB,, | Performed by: INTERNAL MEDICINE

## 2021-09-13 PROCEDURE — 3008F BODY MASS INDEX DOCD: CPT | Mod: CPTII,S$GLB,, | Performed by: INTERNAL MEDICINE

## 2021-09-13 PROCEDURE — 3044F PR MOST RECENT HEMOGLOBIN A1C LEVEL <7.0%: ICD-10-PCS | Mod: CPTII,S$GLB,, | Performed by: INTERNAL MEDICINE

## 2021-12-29 ENCOUNTER — PATIENT MESSAGE (OUTPATIENT)
Dept: ADMINISTRATIVE | Facility: OTHER | Age: 41
End: 2021-12-29
Payer: COMMERCIAL

## 2022-01-03 ENCOUNTER — PATIENT MESSAGE (OUTPATIENT)
Dept: INTERNAL MEDICINE | Facility: CLINIC | Age: 42
End: 2022-01-03
Payer: COMMERCIAL

## 2022-03-11 ENCOUNTER — PATIENT MESSAGE (OUTPATIENT)
Dept: INTERNAL MEDICINE | Facility: CLINIC | Age: 42
End: 2022-03-11
Payer: COMMERCIAL

## 2022-03-11 DIAGNOSIS — I10 ESSENTIAL HYPERTENSION: ICD-10-CM

## 2022-03-11 DIAGNOSIS — E78.5 HYPERLIPIDEMIA, UNSPECIFIED HYPERLIPIDEMIA TYPE: ICD-10-CM

## 2022-03-11 RX ORDER — LISINOPRIL 40 MG/1
40 TABLET ORAL DAILY
Qty: 90 TABLET | Refills: 3 | Status: SHIPPED | OUTPATIENT
Start: 2022-03-11 | End: 2023-08-02 | Stop reason: SDUPTHER

## 2022-03-11 RX ORDER — ATORVASTATIN CALCIUM 20 MG/1
20 TABLET, FILM COATED ORAL DAILY
Qty: 30 TABLET | Refills: 3 | Status: SHIPPED | OUTPATIENT
Start: 2022-03-11 | End: 2022-03-14 | Stop reason: SDUPTHER

## 2022-03-11 NOTE — TELEPHONE ENCOUNTER
INcrease lisinopril to 40 mg daily.  If symptoms worsen, to ED.  Continue monitoring Bp and report response to increased dosing of meds.

## 2022-03-11 NOTE — TELEPHONE ENCOUNTER
No new care gaps identified.  Powered by [employer]. Reference number: [id number].   3/11/2022 11:29:03 AM CST

## 2022-03-11 NOTE — TELEPHONE ENCOUNTER
Care Due:                  Date            Visit Type   Department     Provider  --------------------------------------------------------------------------------                                EP -                              PRIMARY      [facility] INTERNAL  Last Visit: 06-      CARE ([facility])   MEDICINE       Jordin Hampton                              MYCHART                              ANNUAL                              CHECKUP/PHY  [facility] INTERNAL  Next Visit: 03-      [facility]       Jordin Hampton                                                            Last  Test          Frequency    Reason                     Performed    Due Date  --------------------------------------------------------------------------------    CMP.........  12 months..  atorvastatin.............  04- 04-    HBA1C.......  6 months...  metFORMIN................  04-   10-    Lipid Panel.  12 months..  atorvastatin.............  04- 04-    Powered by [employer]. Reference number: [id number].   3/11/2022 11:11:18 AM CST

## 2022-03-14 ENCOUNTER — PATIENT MESSAGE (OUTPATIENT)
Dept: INTERNAL MEDICINE | Facility: CLINIC | Age: 42
End: 2022-03-14
Payer: COMMERCIAL

## 2022-03-14 DIAGNOSIS — E78.5 HYPERLIPIDEMIA, UNSPECIFIED HYPERLIPIDEMIA TYPE: ICD-10-CM

## 2022-03-14 RX ORDER — ATORVASTATIN CALCIUM 20 MG/1
20 TABLET, FILM COATED ORAL DAILY
Qty: 30 TABLET | Refills: 3 | Status: SHIPPED | OUTPATIENT
Start: 2022-03-14 | End: 2022-10-25

## 2022-03-14 NOTE — TELEPHONE ENCOUNTER
No new care gaps identified.  Powered by [employer]. Reference number: [id number].   3/14/2022 1:28:21 PM CDT

## 2022-03-15 RX ORDER — ATORVASTATIN CALCIUM 20 MG/1
TABLET, FILM COATED ORAL
Qty: 30 TABLET | Refills: 0 | OUTPATIENT
Start: 2022-03-15

## 2022-03-15 NOTE — TELEPHONE ENCOUNTER
Provider Staff:     Action is required for this patient.   Please see care gap opportunities below in Care Due Message.     Thanks!  [facility]     Appointments      Date Provider   Last Visit   6/14/2021 Jordin Hampton MD   Next Visit   3/24/2022 Jordin Hampton MD     Note composed:12:23 PM 03/15/2022

## 2022-03-29 ENCOUNTER — OFFICE VISIT (OUTPATIENT)
Dept: URGENT CARE | Facility: CLINIC | Age: 42
End: 2022-03-29
Payer: COMMERCIAL

## 2022-03-29 VITALS
DIASTOLIC BLOOD PRESSURE: 86 MMHG | SYSTOLIC BLOOD PRESSURE: 143 MMHG | TEMPERATURE: 98 F | BODY MASS INDEX: 42.66 KG/M2 | HEART RATE: 78 BPM | RESPIRATION RATE: 18 BRPM | HEIGHT: 72 IN | OXYGEN SATURATION: 98 % | WEIGHT: 315 LBS

## 2022-03-29 DIAGNOSIS — J02.9 SORE THROAT: ICD-10-CM

## 2022-03-29 DIAGNOSIS — R09.82 ALLERGIC RHINITIS WITH POSTNASAL DRIP: Primary | ICD-10-CM

## 2022-03-29 DIAGNOSIS — R09.81 COUGH WITH CONGESTION OF PARANASAL SINUS: ICD-10-CM

## 2022-03-29 DIAGNOSIS — R03.0 ELEVATED BLOOD PRESSURE READING: ICD-10-CM

## 2022-03-29 DIAGNOSIS — R06.7 SNEEZING WITH WATERY EYES: ICD-10-CM

## 2022-03-29 DIAGNOSIS — H04.209 SNEEZING WITH WATERY EYES: ICD-10-CM

## 2022-03-29 DIAGNOSIS — R05.8 COUGH WITH CONGESTION OF PARANASAL SINUS: ICD-10-CM

## 2022-03-29 DIAGNOSIS — J30.9 ALLERGIC RHINITIS WITH POSTNASAL DRIP: Primary | ICD-10-CM

## 2022-03-29 PROCEDURE — 1159F PR MEDICATION LIST DOCUMENTED IN MEDICAL RECORD: ICD-10-PCS | Mod: CPTII,S$GLB,, | Performed by: PHYSICIAN ASSISTANT

## 2022-03-29 PROCEDURE — 99214 PR OFFICE/OUTPT VISIT, EST, LEVL IV, 30-39 MIN: ICD-10-PCS | Mod: S$GLB,,, | Performed by: PHYSICIAN ASSISTANT

## 2022-03-29 PROCEDURE — 3008F PR BODY MASS INDEX (BMI) DOCUMENTED: ICD-10-PCS | Mod: CPTII,S$GLB,, | Performed by: PHYSICIAN ASSISTANT

## 2022-03-29 PROCEDURE — 1160F RVW MEDS BY RX/DR IN RCRD: CPT | Mod: CPTII,S$GLB,, | Performed by: PHYSICIAN ASSISTANT

## 2022-03-29 PROCEDURE — 3077F SYST BP >= 140 MM HG: CPT | Mod: CPTII,S$GLB,, | Performed by: PHYSICIAN ASSISTANT

## 2022-03-29 PROCEDURE — 3079F DIAST BP 80-89 MM HG: CPT | Mod: CPTII,S$GLB,, | Performed by: PHYSICIAN ASSISTANT

## 2022-03-29 PROCEDURE — 99214 OFFICE O/P EST MOD 30 MIN: CPT | Mod: S$GLB,,, | Performed by: PHYSICIAN ASSISTANT

## 2022-03-29 PROCEDURE — 4010F ACE/ARB THERAPY RXD/TAKEN: CPT | Mod: CPTII,S$GLB,, | Performed by: PHYSICIAN ASSISTANT

## 2022-03-29 PROCEDURE — 4010F PR ACE/ARB THEARPY RXD/TAKEN: ICD-10-PCS | Mod: CPTII,S$GLB,, | Performed by: PHYSICIAN ASSISTANT

## 2022-03-29 PROCEDURE — 1159F MED LIST DOCD IN RCRD: CPT | Mod: CPTII,S$GLB,, | Performed by: PHYSICIAN ASSISTANT

## 2022-03-29 PROCEDURE — 1160F PR REVIEW ALL MEDS BY PRESCRIBER/CLIN PHARMACIST DOCUMENTED: ICD-10-PCS | Mod: CPTII,S$GLB,, | Performed by: PHYSICIAN ASSISTANT

## 2022-03-29 PROCEDURE — 3008F BODY MASS INDEX DOCD: CPT | Mod: CPTII,S$GLB,, | Performed by: PHYSICIAN ASSISTANT

## 2022-03-29 PROCEDURE — 3077F PR MOST RECENT SYSTOLIC BLOOD PRESSURE >= 140 MM HG: ICD-10-PCS | Mod: CPTII,S$GLB,, | Performed by: PHYSICIAN ASSISTANT

## 2022-03-29 PROCEDURE — 3079F PR MOST RECENT DIASTOLIC BLOOD PRESSURE 80-89 MM HG: ICD-10-PCS | Mod: CPTII,S$GLB,, | Performed by: PHYSICIAN ASSISTANT

## 2022-03-29 RX ORDER — FLUTICASONE PROPIONATE 50 MCG
1 SPRAY, SUSPENSION (ML) NASAL DAILY
Qty: 9.9 ML | Refills: 0 | Status: SHIPPED | OUTPATIENT
Start: 2022-03-29 | End: 2022-04-18

## 2022-03-29 RX ORDER — MONTELUKAST SODIUM 10 MG/1
10 TABLET ORAL DAILY
Qty: 30 TABLET | Refills: 0 | Status: SHIPPED | OUTPATIENT
Start: 2022-03-29 | End: 2022-04-18

## 2022-03-29 NOTE — PROGRESS NOTES
Subjective:       Patient ID: Juan Pablo Saeed is a 41 y.o. male.    Vitals:  height is 6' (1.829 m) and weight is 165.6 kg (365 lb) (abnormal). His tympanic temperature is 98.2 °F (36.8 °C). His blood pressure is 143/86 (abnormal) and his pulse is 78. His respiration is 18 and oxygen saturation is 98%.     Chief Complaint: Sinus Problem    39-year-old male presents urgent care complaining of sinus congestion which started about 2 days ago.  There is associated sinus pressure, sore throat, postnasal drip, cough, and sneezing.  Patient has tried over-the-counter NyQuil with no relief.  Does not take a daily antihistamine.  Declined COVID and flu test today.    Sinus Problem  This is a new problem. The current episode started in the past 7 days. The problem is unchanged. There has been no fever. His pain is at a severity of 0/10. He is experiencing no pain. Associated symptoms include congestion, coughing, sinus pressure, sneezing and a sore throat. Pertinent negatives include no chills, diaphoresis, ear pain, headaches, hoarse voice, neck pain, shortness of breath or swollen glands. Past treatments include oral decongestants. The treatment provided no relief.       Constitution: Negative for chills and sweating.   HENT: Positive for congestion, sinus pressure and sore throat. Negative for ear pain.    Neck: Negative for neck pain.   Respiratory: Positive for cough. Negative for shortness of breath.    Allergic/Immunologic: Positive for sneezing.   Neurological: Negative for headaches.       Objective:       Vitals:    03/29/22 0934   BP: (!) 143/86   Pulse: 78   Resp: 18   Temp: 98.2 °F (36.8 °C)   TempSrc: Tympanic   SpO2: 98%   Weight: (!) 165.6 kg (365 lb)   Height: 6' (1.829 m)       Physical Exam   Constitutional: He is oriented to person, place, and time. He appears well-developed. He is cooperative.  Non-toxic appearance. He appears ill. No distress. obesity  HENT:   Head: Normocephalic and atraumatic.    Ears:   Right Ear: Hearing, tympanic membrane, external ear and ear canal normal.   Left Ear: Hearing, tympanic membrane, external ear and ear canal normal.   Nose: Congestion present. No mucosal edema, rhinorrhea or nasal deformity. No epistaxis. Right sinus exhibits no maxillary sinus tenderness and no frontal sinus tenderness. Left sinus exhibits no maxillary sinus tenderness and no frontal sinus tenderness.   Mouth/Throat: Uvula is midline and mucous membranes are normal. Mucous membranes are moist. No trismus in the jaw. Normal dentition. No uvula swelling. No oropharyngeal exudate or posterior oropharyngeal erythema. Oropharynx is clear.      Comments: Postnasal drip  Eyes: Conjunctivae and lids are normal. Pupils are equal, round, and reactive to light. Right eye exhibits discharge. Left eye exhibits discharge. No scleral icterus.      extraocular movement intact      Comments: Watery eyes   Neck: Trachea normal and phonation normal. Neck supple. No neck rigidity present.   Cardiovascular: Normal rate, regular rhythm, normal heart sounds and normal pulses.   Pulmonary/Chest: Effort normal and breath sounds normal. No stridor. No respiratory distress. He has no wheezes. He exhibits no tenderness.   Abdominal: Normal appearance and bowel sounds are normal. He exhibits no distension and no mass. Soft. There is no abdominal tenderness. There is no rebound and no guarding.   Musculoskeletal: Normal range of motion.         General: No deformity. Normal range of motion.      Right lower leg: No edema.      Left lower leg: No edema.   Lymphadenopathy:     He has no cervical adenopathy.   Neurological: no focal deficit. He is alert, oriented to person, place, and time and at baseline. He exhibits normal muscle tone. Coordination normal.   Skin: Skin is warm, dry, intact, not diaphoretic, not pale and no rash. Capillary refill takes less than 2 seconds.   Psychiatric: His speech is normal and behavior is normal.  Judgment and thought content normal.   Nursing note and vitals reviewed.        Assessment:       1. Allergic rhinitis with postnasal drip    2. Sore throat    3. Cough with congestion of paranasal sinus    4. Elevated blood pressure affecting pregnancy in first trimester, antepartum    5. Sneezing with watery eyes          Plan:         Allergic rhinitis with postnasal drip  -     montelukast (SINGULAIR) 10 mg tablet; Take 1 tablet (10 mg total) by mouth once daily.  Dispense: 30 tablet; Refill: 0  -     fluticasone propionate (FLONASE) 50 mcg/actuation nasal spray; 1 spray (50 mcg total) by Each Nostril route once daily.  Dispense: 9.9 mL; Refill: 0    Sore throat    Cough with congestion of paranasal sinus    Elevated blood pressure affecting pregnancy in first trimester, antepartum    Sneezing with watery eyes           Medical Decision Making:   Urgent Care Management:  Patient declined COVID and flu testing today.       Patient Instructions   Singulair + Flonase as directed    Sore throat -You can purchase cough drops over the counter to soothe your sore throat.  You may gargle with hot salt water 4 times a day for the next 2 days then you may also gargle diluted hydrogen peroxide once to twice daily to alleviate some of your throat discomfort/postnasal drip.  Drink plenty of fluids, recommend warm tea with honey.   Avoid spicy food, citrus fruits, and red sauces- as this may irritate the throat more.    Over-the-counter antihistamines that can be taken daily after he finished Singulair-- You can also take a daily anti-histamine such as Zyrtec, Claritin, Xyzal, Allegra, etc to help with runny nose/sneezing/sore throat/cough.    If your symptoms do not improve, you should return to this clinic. If your symptoms worsen, go to the emergency room.     Cough--Make sure you are getting rest and drinking lots of fluids.      You can use cough drops (recommend ricola lemon mint honey) or Cepacol to soothe your sore  throat.     You can also take Elderberry and/or Emergen-C (vitamin C) to help boost your immune system.      OVER THE COUNTER RECOMMENDATIONS/SUGGESTIONS.--if needed  Remember to switch antihistamines every 3 months, if taken daily.     Make sure to stay well hydrated.    Use Nasal Saline to mechanically move any post nasal drip from your eustachian tube or from the back of your throat.    Use warm salt water gargles to ease your throat pain. Warm salt water gargles as needed for sore throat-  1/2 tsp salt to 1 cup warm water, gargle as desired.    Use an antihistamine such as Claritin, Zyrtec or Allegra to dry you out (NONDROWSY) or Benadryl (DROWSY).    Use pseudoephedrine (behind the counter) to decongest. Pseudoephedrine  30 mg up to 240 mg /day. *BE AWARE- It can raise your blood pressure and give you palpitations.    Use mucinex (guaifenisin) to break up mucous up to 2400mg/day to loosen any mucous.   The mucinex DM pill has a cough suppressant that can be sedating. It can be used at night to stop the tickle at the back of your throat.  You can use Mucinex D (it has guaifenesin and a high dose of pseudoephedrine) in the mornings to help decongest.      Use Flonase 1-2 sprays/nostril per day. It is a local acting steroid nasal spray, if you develop a bloody nose, stop using the medication immediately.  How do you use a Nasal Corticosteroid Spray?    Make sure you understand your dosing instructions. Spray only the number of prescribed sprays in each nostril. Read the package instructions before using your spray the first time.    Most corticosteroid sprays suggest the following steps:    Wash your hands well.    Gently blow your nose to clear the passageway.    Shake the container several times.    Tilt your head slightly downward.  Use the opposite hand from the nostril you will be spraying to hold the spray bottle.    Block one nostril with your finger.  Insert the nasal applicator into the other  nostril.    Aim the spray toward the outer wall of the nostril.  Inhale slowly through the nose and press the .    Breathe out and repeat to apply the prescribed number of sprays.  Repeat these steps for the other nostril.     Avoid sneezing or blowing your nose right after spraying.           Sometimes Nyquil at night is beneficial to help you get some rest, however it is sedating and it does have an antihistamine, and tylenol.    Honey is a natural cough suppressant that can be used.    Tylenol up to 4,000 mg a day is safe for short periods and can be used for headache, body aches, pain, and fever. However in high doses and prolonged use it can cause liver irritation.    Ibuprofen is a non-steroidal anti-inflammatory that can be used for headache, body aches, pain, and fever.However it can also cause stomach irritation if over used.    Elevated blood pressure reading     Your blood pressure elevated in clinic today.-- please keep an eye on blood pressures.  Record blood pressures and follow up with primary care doctor if blood pressures continue to be elevated  Recommend lifestyle modifications--DASH diet, exercise, weight loss, reducing stress      - You must understand that you have received an Urgent Care treatment only and that you may be released before all of your medical problems are known or treated.   - You, the patient, will arrange for follow up care as instructed with your primary care provider or recommended specialist.   - If your condition worsens or fails to improve we recommend that you receive another evaluation at the ER immediately or contact your PCP to discuss your concerns, or return here.   - Please do not drive or make any important decisions for 24 hours if you have received any pain medications, sedatives or mood altering drugs during your visit.    Disclaimer: This document was drafted with the use of a voice recognition device and is likely to have sound alike errors.

## 2022-03-29 NOTE — LETTER
March 29, 2022      [facility]  [address], [address]  [place] [address]  Phone: [phone]       Patient: Juan Pablo Saeed   YOB: 1980  Date of Visit: 03/29/2022    To Whom It May Concern:    Barbara Saeed  was at [facility] on 03/29/2022. The patient may return to work/school on 3/30 or 3/31  with no restrictions. If you have any questions or concerns, or if I can be of further assistance, please do not hesitate to contact me.    Sincerely,    Echo Barr PA-C

## 2022-03-29 NOTE — PATIENT INSTRUCTIONS
Singulair + Flonase as directed    Sore throat -You can purchase cough drops over the counter to soothe your sore throat.  You may gargle with hot salt water 4 times a day for the next 2 days then you may also gargle diluted hydrogen peroxide once to twice daily to alleviate some of your throat discomfort/postnasal drip.  Drink plenty of fluids, recommend warm tea with honey.   Avoid spicy food, citrus fruits, and red sauces- as this may irritate the throat more.    Over-the-counter antihistamines that can be taken daily after he finished Singulair-- You can also take a daily anti-histamine such as Zyrtec, Claritin, Xyzal, Allegra, etc to help with runny nose/sneezing/sore throat/cough.    If your symptoms do not improve, you should return to this clinic. If your symptoms worsen, go to the emergency room.     Cough--Make sure you are getting rest and drinking lots of fluids.      You can use cough drops (recommend ricola lemon mint honey) or Cepacol to soothe your sore throat.     You can also take Elderberry and/or Emergen-C (vitamin C) to help boost your immune system.      OVER THE COUNTER RECOMMENDATIONS/SUGGESTIONS.--if needed  Remember to switch antihistamines every 3 months, if taken daily.     Make sure to stay well hydrated.    Use Nasal Saline to mechanically move any post nasal drip from your eustachian tube or from the back of your throat.    Use warm salt water gargles to ease your throat pain. Warm salt water gargles as needed for sore throat-  1/2 tsp salt to 1 cup warm water, gargle as desired.    Use an antihistamine such as Claritin, Zyrtec or Allegra to dry you out (NONDROWSY) or Benadryl (DROWSY).    Use pseudoephedrine (behind the counter) to decongest. Pseudoephedrine  30 mg up to 240 mg /day. *BE AWARE- It can raise your blood pressure and give you palpitations.    Use mucinex (guaifenisin) to break up mucous up to 2400mg/day to loosen any mucous.   The mucinex DM pill has a cough suppressant  that can be sedating. It can be used at night to stop the tickle at the back of your throat.  You can use Mucinex D (it has guaifenesin and a high dose of pseudoephedrine) in the mornings to help decongest.      Use Flonase 1-2 sprays/nostril per day. It is a local acting steroid nasal spray, if you develop a bloody nose, stop using the medication immediately.  How do you use a Nasal Corticosteroid Spray?    Make sure you understand your dosing instructions. Spray only the number of prescribed sprays in each nostril. Read the package instructions before using your spray the first time.    Most corticosteroid sprays suggest the following steps:    Wash your hands well.    Gently blow your nose to clear the passageway.    Shake the container several times.    Tilt your head slightly downward.  Use the opposite hand from the nostril you will be spraying to hold the spray bottle.    Block one nostril with your finger.  Insert the nasal applicator into the other nostril.    Aim the spray toward the outer wall of the nostril.  Inhale slowly through the nose and press the .    Breathe out and repeat to apply the prescribed number of sprays.  Repeat these steps for the other nostril.     Avoid sneezing or blowing your nose right after spraying.           Sometimes Nyquil at night is beneficial to help you get some rest, however it is sedating and it does have an antihistamine, and tylenol.    Honey is a natural cough suppressant that can be used.    Tylenol up to 4,000 mg a day is safe for short periods and can be used for headache, body aches, pain, and fever. However in high doses and prolonged use it can cause liver irritation.    Ibuprofen is a non-steroidal anti-inflammatory that can be used for headache, body aches, pain, and fever.However it can also cause stomach irritation if over used.    Elevated blood pressure reading     Your blood pressure elevated in clinic today.-- please keep an eye on blood  pressures.  Record blood pressures and follow up with primary care doctor if blood pressures continue to be elevated  Recommend lifestyle modifications--DASH diet, exercise, weight loss, reducing stress      - You must understand that you have received an Urgent Care treatment only and that you may be released before all of your medical problems are known or treated.   - You, the patient, will arrange for follow up care as instructed with your primary care provider or recommended specialist.   - If your condition worsens or fails to improve we recommend that you receive another evaluation at the ER immediately or contact your PCP to discuss your concerns, or return here.   - Please do not drive or make any important decisions for 24 hours if you have received any pain medications, sedatives or mood altering drugs during your visit.    Disclaimer: This document was drafted with the use of a voice recognition device and is likely to have sound alike errors.

## 2022-04-18 ENCOUNTER — OFFICE VISIT (OUTPATIENT)
Dept: INTERNAL MEDICINE | Facility: CLINIC | Age: 42
End: 2022-04-18
Payer: COMMERCIAL

## 2022-04-18 ENCOUNTER — LAB VISIT (OUTPATIENT)
Dept: LAB | Facility: HOSPITAL | Age: 42
End: 2022-04-18
Attending: INTERNAL MEDICINE
Payer: COMMERCIAL

## 2022-04-18 ENCOUNTER — PATIENT MESSAGE (OUTPATIENT)
Dept: PULMONOLOGY | Facility: CLINIC | Age: 42
End: 2022-04-18
Payer: COMMERCIAL

## 2022-04-18 VITALS
WEIGHT: 315 LBS | OXYGEN SATURATION: 97 % | DIASTOLIC BLOOD PRESSURE: 82 MMHG | BODY MASS INDEX: 42.66 KG/M2 | SYSTOLIC BLOOD PRESSURE: 128 MMHG | TEMPERATURE: 97 F | HEART RATE: 84 BPM | HEIGHT: 72 IN

## 2022-04-18 DIAGNOSIS — G60.9 IDIOPATHIC PERIPHERAL NEUROPATHY: ICD-10-CM

## 2022-04-18 DIAGNOSIS — Z00.00 ROUTINE GENERAL MEDICAL EXAMINATION AT HEALTH CARE FACILITY: ICD-10-CM

## 2022-04-18 DIAGNOSIS — Z00.00 ROUTINE GENERAL MEDICAL EXAMINATION AT HEALTH CARE FACILITY: Primary | ICD-10-CM

## 2022-04-18 DIAGNOSIS — I10 ESSENTIAL HYPERTENSION: ICD-10-CM

## 2022-04-18 DIAGNOSIS — G56.03 BILATERAL CARPAL TUNNEL SYNDROME: ICD-10-CM

## 2022-04-18 DIAGNOSIS — E78.2 MIXED HYPERLIPIDEMIA: ICD-10-CM

## 2022-04-18 DIAGNOSIS — G47.33 OSA (OBSTRUCTIVE SLEEP APNEA): ICD-10-CM

## 2022-04-18 DIAGNOSIS — E88.819 INSULIN RESISTANCE: ICD-10-CM

## 2022-04-18 LAB
ALBUMIN SERPL BCP-MCNC: 4 G/DL (ref 3.5–5.2)
ALP SERPL-CCNC: 55 U/L (ref 55–135)
ALT SERPL W/O P-5'-P-CCNC: 26 U/L (ref 10–44)
ANION GAP SERPL CALC-SCNC: 11 MMOL/L (ref 8–16)
AST SERPL-CCNC: 27 U/L (ref 10–40)
BASOPHILS # BLD AUTO: 0.04 K/UL (ref 0–0.2)
BASOPHILS NFR BLD: 0.8 % (ref 0–1.9)
BILIRUB SERPL-MCNC: 1 MG/DL (ref 0.1–1)
BUN SERPL-MCNC: 15 MG/DL (ref 6–20)
CALCIUM SERPL-MCNC: 10 MG/DL (ref 8.7–10.5)
CHLORIDE SERPL-SCNC: 103 MMOL/L (ref 95–110)
CHOLEST SERPL-MCNC: 181 MG/DL (ref 120–199)
CHOLEST/HDLC SERPL: 5.8 {RATIO} (ref 2–5)
CO2 SERPL-SCNC: 25 MMOL/L (ref 23–29)
CREAT SERPL-MCNC: 1.6 MG/DL (ref 0.5–1.4)
DIFFERENTIAL METHOD: ABNORMAL
EOSINOPHIL # BLD AUTO: 0.1 K/UL (ref 0–0.5)
EOSINOPHIL NFR BLD: 1.6 % (ref 0–8)
ERYTHROCYTE [DISTWIDTH] IN BLOOD BY AUTOMATED COUNT: 13.6 % (ref 11.5–14.5)
EST. GFR  (AFRICAN AMERICAN): >60 ML/MIN/1.73 M^2
EST. GFR  (NON AFRICAN AMERICAN): 52.4 ML/MIN/1.73 M^2
ESTIMATED AVG GLUCOSE: 120 MG/DL (ref 68–131)
GLUCOSE SERPL-MCNC: 88 MG/DL (ref 70–110)
HBA1C MFR BLD: 5.8 % (ref 4–5.6)
HCT VFR BLD AUTO: 44.2 % (ref 40–54)
HDLC SERPL-MCNC: 31 MG/DL (ref 40–75)
HDLC SERPL: 17.1 % (ref 20–50)
HGB BLD-MCNC: 14.1 G/DL (ref 14–18)
IMM GRANULOCYTES # BLD AUTO: 0.02 K/UL (ref 0–0.04)
IMM GRANULOCYTES NFR BLD AUTO: 0.4 % (ref 0–0.5)
LDLC SERPL CALC-MCNC: 100 MG/DL (ref 63–159)
LYMPHOCYTES # BLD AUTO: 2.1 K/UL (ref 1–4.8)
LYMPHOCYTES NFR BLD: 41.9 % (ref 18–48)
MCH RBC QN AUTO: 26.2 PG (ref 27–31)
MCHC RBC AUTO-ENTMCNC: 31.9 G/DL (ref 32–36)
MCV RBC AUTO: 82 FL (ref 82–98)
MONOCYTES # BLD AUTO: 0.5 K/UL (ref 0.3–1)
MONOCYTES NFR BLD: 10.9 % (ref 4–15)
NEUTROPHILS # BLD AUTO: 2.2 K/UL (ref 1.8–7.7)
NEUTROPHILS NFR BLD: 44.4 % (ref 38–73)
NONHDLC SERPL-MCNC: 150 MG/DL
NRBC BLD-RTO: 0 /100 WBC
PLATELET # BLD AUTO: 279 K/UL (ref 150–450)
PMV BLD AUTO: 10.6 FL (ref 9.2–12.9)
POTASSIUM SERPL-SCNC: 4.2 MMOL/L (ref 3.5–5.1)
PROT SERPL-MCNC: 8 G/DL (ref 6–8.4)
RBC # BLD AUTO: 5.38 M/UL (ref 4.6–6.2)
SODIUM SERPL-SCNC: 139 MMOL/L (ref 136–145)
TRIGL SERPL-MCNC: 250 MG/DL (ref 30–150)
TSH SERPL DL<=0.005 MIU/L-ACNC: 2 UIU/ML (ref 0.4–4)
WBC # BLD AUTO: 4.97 K/UL (ref 3.9–12.7)

## 2022-04-18 PROCEDURE — 99396 PR PREVENTIVE VISIT,EST,40-64: ICD-10-PCS | Mod: S$GLB,,, | Performed by: INTERNAL MEDICINE

## 2022-04-18 PROCEDURE — 3079F PR MOST RECENT DIASTOLIC BLOOD PRESSURE 80-89 MM HG: ICD-10-PCS | Mod: CPTII,S$GLB,, | Performed by: INTERNAL MEDICINE

## 2022-04-18 PROCEDURE — 1159F PR MEDICATION LIST DOCUMENTED IN MEDICAL RECORD: ICD-10-PCS | Mod: CPTII,S$GLB,, | Performed by: INTERNAL MEDICINE

## 2022-04-18 PROCEDURE — 1160F RVW MEDS BY RX/DR IN RCRD: CPT | Mod: CPTII,S$GLB,, | Performed by: INTERNAL MEDICINE

## 2022-04-18 PROCEDURE — 99999 PR PBB SHADOW E&M-EST. PATIENT-LVL IV: ICD-10-PCS | Mod: PBBFAC,,, | Performed by: INTERNAL MEDICINE

## 2022-04-18 PROCEDURE — 99396 PREV VISIT EST AGE 40-64: CPT | Mod: S$GLB,,, | Performed by: INTERNAL MEDICINE

## 2022-04-18 PROCEDURE — 80061 LIPID PANEL: CPT | Performed by: INTERNAL MEDICINE

## 2022-04-18 PROCEDURE — 85025 COMPLETE CBC W/AUTO DIFF WBC: CPT | Performed by: INTERNAL MEDICINE

## 2022-04-18 PROCEDURE — 1159F MED LIST DOCD IN RCRD: CPT | Mod: CPTII,S$GLB,, | Performed by: INTERNAL MEDICINE

## 2022-04-18 PROCEDURE — 3074F SYST BP LT 130 MM HG: CPT | Mod: CPTII,S$GLB,, | Performed by: INTERNAL MEDICINE

## 2022-04-18 PROCEDURE — 80053 COMPREHEN METABOLIC PANEL: CPT | Performed by: INTERNAL MEDICINE

## 2022-04-18 PROCEDURE — 3008F BODY MASS INDEX DOCD: CPT | Mod: CPTII,S$GLB,, | Performed by: INTERNAL MEDICINE

## 2022-04-18 PROCEDURE — 4010F ACE/ARB THERAPY RXD/TAKEN: CPT | Mod: CPTII,S$GLB,, | Performed by: INTERNAL MEDICINE

## 2022-04-18 PROCEDURE — 4010F PR ACE/ARB THEARPY RXD/TAKEN: ICD-10-PCS | Mod: CPTII,S$GLB,, | Performed by: INTERNAL MEDICINE

## 2022-04-18 PROCEDURE — 3079F DIAST BP 80-89 MM HG: CPT | Mod: CPTII,S$GLB,, | Performed by: INTERNAL MEDICINE

## 2022-04-18 PROCEDURE — 83036 HEMOGLOBIN GLYCOSYLATED A1C: CPT | Performed by: INTERNAL MEDICINE

## 2022-04-18 PROCEDURE — 3074F PR MOST RECENT SYSTOLIC BLOOD PRESSURE < 130 MM HG: ICD-10-PCS | Mod: CPTII,S$GLB,, | Performed by: INTERNAL MEDICINE

## 2022-04-18 PROCEDURE — 3008F PR BODY MASS INDEX (BMI) DOCUMENTED: ICD-10-PCS | Mod: CPTII,S$GLB,, | Performed by: INTERNAL MEDICINE

## 2022-04-18 PROCEDURE — 36415 COLL VENOUS BLD VENIPUNCTURE: CPT | Mod: PO | Performed by: INTERNAL MEDICINE

## 2022-04-18 PROCEDURE — 99999 PR PBB SHADOW E&M-EST. PATIENT-LVL IV: CPT | Mod: PBBFAC,,, | Performed by: INTERNAL MEDICINE

## 2022-04-18 PROCEDURE — 1160F PR REVIEW ALL MEDS BY PRESCRIBER/CLIN PHARMACIST DOCUMENTED: ICD-10-PCS | Mod: CPTII,S$GLB,, | Performed by: INTERNAL MEDICINE

## 2022-04-18 PROCEDURE — 84443 ASSAY THYROID STIM HORMONE: CPT | Performed by: INTERNAL MEDICINE

## 2022-04-18 RX ORDER — ACETAMINOPHEN 325 MG/1
325 TABLET ORAL EVERY 6 HOURS PRN
COMMUNITY

## 2022-04-18 NOTE — PATIENT INSTRUCTIONS
Patient Education       Guide to Eating When You Have Diabetes   About this topic   This guide will help you control your blood sugar along with exercise and the drugs you are taking. You want to have a balanced amount of carbohydrates, fats, and protein in your meal. Following these diet guidelines may also help control your blood pressure and cholesterol.     What will the results be?   When you follow these diet guidelines, your blood sugar may be easier to keep within the goal blood sugar ranges. Ask your doctor for your goal blood sugar ranges.  What changes to diet are needed?   You need to balance how much carbohydrate, fat, and protein is in your meals. You also need to control the amount or portion size of the food you eat. Eat meals at about the same time every day. Do not skip a meal. Talk to your dietitian about making a personal meal plan for you.     Who should use this diet?   This diet is helpful to people with high blood sugar or diabetes.   What foods are good to eat?   Whole grains like:  1/3 cup (80 grams) brown rice  1/3 cup (80 grams) wild rice  1/3 cup (80 grams) whole wheat pasta  1 slice whole wheat or whole grain bread  3/4 cup (180 grams) high-fiber cereal  1/2 cup (120 grams) cooked oatmeal  1/2 (120 grams) English muffin  Fruits and vegetables like:  1/2 cup (120 grams) sweet potatoes  1/2 cup (120 grams) cooked vegetables, like squash, green beans, cauliflower, carrots, and cabbage  1 cup (240 grams) raw vegetables or salad greens  1 small apples or oranges  1/2 cup (120 grams) unsweetened fruit juice  Proteins like:  1 ounce (30 grams) lean beef or pork  1 ounce (30 grams) chicken, skin removed  1 ounce (30 grams) turkey, skin removed  1 ounce (30 grams) fish  1 ounce (30 grams) low-fat cheese or lunch meat  1/2 cup (120 grams) cooked beans ? black, kidney, chickpeas, or lentils  1 whole egg  What foods should be limited or avoided?   High fat or processed foods  like:  Bond  Sausage  Hot dogs  Processed snacks  Fats and oils like:  Margarine  Salad dressings  Foods that are high in salt like:  Table salt  Salted breads, rolls, crackers  Commercially-prepared potatoes and vegetable mixes  Canned vegetables and juices  Canned soups  Smoked, cured, or salted meats  Starches that are not whole grain like:  White rice  White potatoes  French fries  Pasta  White bread  Sugary cereals  Instant oatmeal  Baked goods, pastries  Croissants  What can be done to prevent this health problem?   Type 1 diabetes is a lifelong problem and you cannot prevent it. Type 2 diabetes can be prevented or delayed with lifestyle changes. You can still lead a normal life. Diabetes can be managed through diet, exercise, and drugs. Family members and friends can help you practice good health behaviors.  When do I need to call the doctor?   Blood sugar level is above 240 for more than a day  Blood sugar level drops to less than 40  Abnormal urine test results  Trouble breathing  Very sleepy  Throw up more than once  Many loose stools  Questions about your diet plan  Helpful tips   Try to eat smaller portions of a healthy balanced diet throughout the day. Take time to plan your meals and snacks.  Where can I learn more?   American Diabetes Association  https://www.cdc.gov/diabetes/managing/eat-well/meal-plan-method.html   HelpGuide.org  http://www.helpguide.org/home-pages/healthy-eating.htm   HelpGuide.org  http://www.helpguide.org/articles/diet-weight-loss/diabetes-diet-and-food-tips.htm   Last Reviewed Date   2021-07-21  Consumer Information Use and Disclaimer   This information is not specific medical advice and does not replace information you receive from your health care provider. This is only a brief summary of general information. It does NOT include all information about conditions, illnesses, injuries, tests, procedures, treatments, therapies, discharge instructions or life-style choices that  may apply to you. You must talk with your health care provider for complete information about your health and treatment options. This information should not be used to decide whether or not to accept your health care providers advice, instructions or recommendations. Only your health care provider has the knowledge and training to provide advice that is right for you.   Copyright   Copyright © 2021 [employer] and its affiliates and/or licensors. All rights reserved.

## 2022-04-18 NOTE — PROGRESS NOTES
Subjective:       Patient ID: Juan Pablo Saeed is a 41 y.o. male.    Chief Complaint: Annual Exam and Medication Problem (Stated BP medication causes his BP to fluctuate )    HPI patient is a 41-year-old male presenting today for updated physical exam review of chronic health issues.  Patient has history of hypertension, hyperlipidemia, insulin resistance, sleep apnea, bilateral carpal tunnel and idiopathic peripheral neuropathy.  In regards to his chronic conditions everything seems to be relatively stable at this time.  He has good control the blood pressure and his cholesterol has been stable over time.  Will be updating labs on those issues.  His insulin resistance he continues take his metformin and we will be updating his A1c.  He continues to wear his sleep apnea in device but does note that he struggles with it.  He states that the device does wake him up through the night knee often times does not finish the night with it on.  He also notes that he has continued to struggle with some nocturnal symptoms of night sweats which have not been defined by cause.    Patient has an ongoing issue with bilateral carpal tunnel syndrome.  He is interested in talking to surgeon about surgical options at this time.  He finds it very difficult to get [facility] to see a surgeon so he wanted to see if there is somebody in the [facility] area that could help him with this.    Patient has ongoing idiopathic peripheral neuropathy.  He has been on gabapentin which has been helpful to some extent.  He is demonstrating that he is now having issues with sweats.  He states he gets night sweats also his palms will get sweaty.  Reviewing his medication list liver is not anything specific on his medications that would be suspicious for this symptom.  We talked about the possibility that his neuropathy may have an autonomic component which could give him some of these issues around temperature and could cause  sweating.    Review of Systems   Constitutional: Positive for diaphoresis. Negative for fever and unexpected weight change.   HENT: Negative for hearing loss, postnasal drip and rhinorrhea.    Eyes: Negative for pain and visual disturbance.   Respiratory: Negative for cough, shortness of breath and wheezing.    Cardiovascular: Negative for chest pain and palpitations.   Gastrointestinal: Negative for constipation, diarrhea, nausea and vomiting.   Genitourinary: Negative for dysuria and hematuria.   Musculoskeletal: Negative for arthralgias, back pain, myalgias and neck stiffness.   Skin: Negative for pallor and rash.   Neurological: Negative for seizures, syncope and headaches.   Hematological: Negative for adenopathy.   Psychiatric/Behavioral: Negative for dysphoric mood. The patient is not nervous/anxious.        Objective:   /82   Pulse 84   Temp 96.8 °F (36 °C)   Ht 6' (1.829 m)   Wt (!) 168.7 kg (371 lb 14.7 oz)   SpO2 97%   BMI 50.44 kg/m²      Physical Exam  Vitals reviewed.   Constitutional:       General: He is not in acute distress.     Appearance: He is well-developed.   HENT:      Head: Normocephalic and atraumatic.      Right Ear: Tympanic membrane and ear canal normal.      Left Ear: Tympanic membrane and ear canal normal.   Eyes:      Pupils: Pupils are equal, round, and reactive to light.   Neck:      Thyroid: No thyromegaly.      Vascular: No JVD.   Cardiovascular:      Rate and Rhythm: Normal rate and regular rhythm.      Heart sounds: Normal heart sounds. No murmur heard.    No friction rub. No gallop.   Pulmonary:      Effort: Pulmonary effort is normal.      Breath sounds: Normal breath sounds. No wheezing or rales.   Abdominal:      General: Bowel sounds are normal. There is no distension.      Palpations: Abdomen is soft.      Tenderness: There is no abdominal tenderness. There is no guarding or rebound.   Musculoskeletal:         General: Normal range of motion.      Cervical  back: Normal range of motion and neck supple.   Lymphadenopathy:      Cervical: No cervical adenopathy.   Skin:     General: Skin is warm and dry.      Findings: No rash.   Neurological:      General: No focal deficit present.      Mental Status: He is alert and oriented to person, place, and time.      Cranial Nerves: No cranial nerve deficit.      Deep Tendon Reflexes: Reflexes are normal and symmetric.   Psychiatric:         Mood and Affect: Mood normal.         Judgment: Judgment normal.             Assessment:       1. Routine general medical examination at health care facility    2. Essential hypertension    3. Mixed hyperlipidemia    4. Insulin resistance    5. MOSHE (obstructive sleep apnea)    6. Bilateral carpal tunnel syndrome    7. Idiopathic peripheral neuropathy        Plan:   Essential hypertension  Blood pressure is under good control.  We will continue the current regimen.  Will work on regular aerobic exercise and a low salt diet.        Insulin resistance  Continue metformin,  Update a1c    Mixed hyperlipidemia  Continue atorvastatin, update labs    MOSHE (obstructive sleep apnea)  Using cpap, struggling with using all night.  Follow up in sleep clinic    Routine general medical examination at health care facility  Comments:  Focus on good health habits, low carb diet, regular exercise, seatbelt use, sunscreen use  Orders:  -     CBC Auto Differential; Future; Expected date: 04/18/2022  -     Comprehensive Metabolic Panel; Future; Expected date: 04/18/2022  -     Hemoglobin A1C; Future; Expected date: 04/18/2022  -     Lipid Panel; Future; Expected date: 04/18/2022  -     TSH; Future; Expected date: 04/18/2022    Essential hypertension    Mixed hyperlipidemia    Insulin resistance    MOSHE (obstructive sleep apnea)  -     Ambulatory referral/consult to Sleep Disorders; Future; Expected date: 04/25/2022    Bilateral carpal tunnel syndrome  -     Ambulatory referral/consult to Orthopedics; Future;  Expected date: 04/25/2022    Idiopathic peripheral neuropathy  Comments:  COntinue gabpentin,  Consider increasing dose if autonomic symptoms persist.            Follow up in about 4 months (around 8/18/2022).

## 2022-04-25 ENCOUNTER — TELEPHONE (OUTPATIENT)
Dept: PULMONOLOGY | Facility: CLINIC | Age: 42
End: 2022-04-25
Payer: COMMERCIAL

## 2022-04-25 NOTE — TELEPHONE ENCOUNTER
Called patient- offered sooner appt at the [place] location. Patient refused that location. Offered the [facility]. Patient stated he would rather [facility].   Scheduled patient and informed him that he was placed on wait list if something came available sooner   Patient stated understanding

## 2022-06-02 ENCOUNTER — OFFICE VISIT (OUTPATIENT)
Dept: PULMONOLOGY | Facility: CLINIC | Age: 42
End: 2022-06-02
Payer: COMMERCIAL

## 2022-06-02 VITALS
HEART RATE: 88 BPM | RESPIRATION RATE: 16 BRPM | BODY MASS INDEX: 42.66 KG/M2 | SYSTOLIC BLOOD PRESSURE: 128 MMHG | OXYGEN SATURATION: 97 % | WEIGHT: 315 LBS | DIASTOLIC BLOOD PRESSURE: 84 MMHG | HEIGHT: 72 IN

## 2022-06-02 DIAGNOSIS — Z72.820 LACK OF ADEQUATE SLEEP: ICD-10-CM

## 2022-06-02 DIAGNOSIS — G47.33 OSA (OBSTRUCTIVE SLEEP APNEA): Primary | ICD-10-CM

## 2022-06-02 DIAGNOSIS — G47.26 SHIFTING SLEEP-WORK SCHEDULE: ICD-10-CM

## 2022-06-02 DIAGNOSIS — E66.01 MORBID OBESITY WITH BMI OF 45.0-49.9, ADULT: ICD-10-CM

## 2022-06-02 PROCEDURE — 1159F MED LIST DOCD IN RCRD: CPT | Mod: CPTII,S$GLB,, | Performed by: NURSE PRACTITIONER

## 2022-06-02 PROCEDURE — 1160F PR REVIEW ALL MEDS BY PRESCRIBER/CLIN PHARMACIST DOCUMENTED: ICD-10-PCS | Mod: CPTII,S$GLB,, | Performed by: NURSE PRACTITIONER

## 2022-06-02 PROCEDURE — 99999 PR PBB SHADOW E&M-EST. PATIENT-LVL III: CPT | Mod: PBBFAC,,, | Performed by: NURSE PRACTITIONER

## 2022-06-02 PROCEDURE — 3074F PR MOST RECENT SYSTOLIC BLOOD PRESSURE < 130 MM HG: ICD-10-PCS | Mod: CPTII,S$GLB,, | Performed by: NURSE PRACTITIONER

## 2022-06-02 PROCEDURE — 3079F DIAST BP 80-89 MM HG: CPT | Mod: CPTII,S$GLB,, | Performed by: NURSE PRACTITIONER

## 2022-06-02 PROCEDURE — 4010F PR ACE/ARB THEARPY RXD/TAKEN: ICD-10-PCS | Mod: CPTII,S$GLB,, | Performed by: NURSE PRACTITIONER

## 2022-06-02 PROCEDURE — 99999 PR PBB SHADOW E&M-EST. PATIENT-LVL III: ICD-10-PCS | Mod: PBBFAC,,, | Performed by: NURSE PRACTITIONER

## 2022-06-02 PROCEDURE — 3079F PR MOST RECENT DIASTOLIC BLOOD PRESSURE 80-89 MM HG: ICD-10-PCS | Mod: CPTII,S$GLB,, | Performed by: NURSE PRACTITIONER

## 2022-06-02 PROCEDURE — 1160F RVW MEDS BY RX/DR IN RCRD: CPT | Mod: CPTII,S$GLB,, | Performed by: NURSE PRACTITIONER

## 2022-06-02 PROCEDURE — 1159F PR MEDICATION LIST DOCUMENTED IN MEDICAL RECORD: ICD-10-PCS | Mod: CPTII,S$GLB,, | Performed by: NURSE PRACTITIONER

## 2022-06-02 PROCEDURE — 3044F HG A1C LEVEL LT 7.0%: CPT | Mod: CPTII,S$GLB,, | Performed by: NURSE PRACTITIONER

## 2022-06-02 PROCEDURE — 99214 OFFICE O/P EST MOD 30 MIN: CPT | Mod: S$GLB,,, | Performed by: NURSE PRACTITIONER

## 2022-06-02 PROCEDURE — 99214 PR OFFICE/OUTPT VISIT, EST, LEVL IV, 30-39 MIN: ICD-10-PCS | Mod: S$GLB,,, | Performed by: NURSE PRACTITIONER

## 2022-06-02 PROCEDURE — 4010F ACE/ARB THERAPY RXD/TAKEN: CPT | Mod: CPTII,S$GLB,, | Performed by: NURSE PRACTITIONER

## 2022-06-02 PROCEDURE — 3008F BODY MASS INDEX DOCD: CPT | Mod: CPTII,S$GLB,, | Performed by: NURSE PRACTITIONER

## 2022-06-02 PROCEDURE — 3008F PR BODY MASS INDEX (BMI) DOCUMENTED: ICD-10-PCS | Mod: CPTII,S$GLB,, | Performed by: NURSE PRACTITIONER

## 2022-06-02 PROCEDURE — 3074F SYST BP LT 130 MM HG: CPT | Mod: CPTII,S$GLB,, | Performed by: NURSE PRACTITIONER

## 2022-06-02 PROCEDURE — 3044F PR MOST RECENT HEMOGLOBIN A1C LEVEL <7.0%: ICD-10-PCS | Mod: CPTII,S$GLB,, | Performed by: NURSE PRACTITIONER

## 2022-06-02 NOTE — PROGRESS NOTES
Subjective:      Patient ID: Juan Pablo Saeed is a 41 y.o. male.    Chief Complaint: Apnea    HPI  Follow up for moderate MOSHE. He has received new APAP machine from recall but feels different. He would like it checked. His mask is not fitting as snug as before.   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 He only sleeps approx 4 hr with 1 hr nap.  [place] Sleepiness scale score: 4    Patient Active Problem List   Diagnosis    Essential hypertension    MOSHE (obstructive sleep apnea)    Psychophysiological insomnia    Sleep-related bruxism    Idiopathic peripheral neuropathy    Lateral epicondylitis of left elbow    Ganglion cyst of left foot    Proteinuria    Mixed hyperlipidemia    Other hemorrhoids    GERD (gastroesophageal reflux disease)    Morbid obesity with BMI of 50.0-59.9, adult    Insulin resistance     /84   Pulse 88   Resp 16   Ht 6' (1.829 m)   Wt (!) 164.3 kg (362 lb 3.5 oz)   SpO2 97%   BMI 49.13 kg/m²   Body mass index is 49.13 kg/m².    Review of Systems   Constitutional: Positive for fatigue.   HENT: Negative.    Respiratory: Positive for snoring.    Cardiovascular: Negative.    Musculoskeletal: Negative.    Gastrointestinal: Negative.    Neurological: Negative.    Psychiatric/Behavioral: Positive for sleep disturbance.     Objective:      Physical Exam  Constitutional:       Appearance: He is well-developed. He is obese.   HENT:      Head: Normocephalic and atraumatic.      Mouth/Throat:      Comments: Wearing mask due to COVID-19 protocol  Neck:      Thyroid: No thyroid mass or thyromegaly.      Trachea: Trachea normal.   Cardiovascular:      Rate and Rhythm: Normal rate and regular rhythm.      Heart sounds: Normal heart sounds.   Pulmonary:      Effort: Pulmonary effort is normal.      Breath sounds: Normal breath sounds. No wheezing, rhonchi or rales.   Chest:      Chest wall: There is no dullness to percussion.   Abdominal:      Palpations: Abdomen is soft. There is no splenomegaly.    Musculoskeletal:         General: Normal range of motion.      Cervical back: Normal range of motion and neck supple.   Skin:     General: Skin is warm and dry.   Neurological:      Mental Status: He is alert and oriented to person, place, and time.   Psychiatric:         Mood and Affect: Mood normal.         Behavior: Behavior normal.       Personal Diagnostic Review  Compliance Summary  11/9/2021 - 5/7/2022 (180 days)  Days with Device Usage 32 days  Days without Device Usage 148 days  Percent Days with Device Usage 17.8%  Cumulative Usage 5 days 4 hrs. 59 mins. 16 secs.  Maximum Usage (1 Day) 6 hrs. 58 mins. 44 secs.  Average Usage (All Days) 41 mins. 39 secs.  Average Usage (Days Used) 3 hrs. 54 mins. 21 secs.  Minimum Usage (1 Day) 41 mins. 5 secs.  Percent of Days with Usage >= 4 Hours 8.3%  Percent of Days with Usage < 4 Hours 91.7%  Date Range  Total Blower Time 6 days 1 hrs. 50 mins. 50 secs.  Average AHI 1.5  Auto-CPAP Summary  Auto-CPAP Mean Pressure 8.8 cmH2O  Auto-CPAP Peak Average Pressure 11.7 cmH2O  Device Pressure <= 90% of Time 11.8 cmH2O  Average Time in Large Leak Per Day 4 secs.      Assessment:       1. MOSHE (obstructive sleep apnea)    2. Morbid obesity with BMI of 45.0-49.9, adult    3. Lack of adequate sleep    4. Shifting sleep-work schedule        Outpatient Encounter Medications as of 6/2/2022   Medication Sig Dispense Refill    acetaminophen (TYLENOL) 325 MG tablet Take 325 mg by mouth every 6 (six) hours as needed for Pain.      atorvastatin (LIPITOR) 20 MG tablet Take 1 tablet (20 mg total) by mouth once daily. 30 tablet 3    gabapentin (NEURONTIN) 300 MG capsule Take 1 capsule by mouth twice daily 60 capsule 0    ibuprofen (ADVIL,MOTRIN) 800 MG tablet Take 800 mg by mouth 3 (three) times daily.      lisinopriL (PRINIVIL,ZESTRIL) 40 MG tablet Take 1 tablet (40 mg total) by mouth once daily. 90 tablet 3    metFORMIN (GLUCOPHAGE) 500 MG tablet Take 1 tablet (500 mg total) by mouth  2 (two) times daily with meals. 180 tablet 3    multivitamin with minerals tablet Take 1 tablet by mouth.      vitamin E 8.9 unit/g Crea Apply to affected area twice daily 112 g 2     No facility-administered encounter medications on file as of 6/2/2022.     Orders Placed This Encounter   Procedures    CPAP/BIPAP SUPPLIES     Order Specific Question:   Length of need (1-99 months):     Answer:   99     Order Specific Question:   Choose ONE mask type and its corresponding cushions and/or pillows:     Answer:    Full Face Mask, 1 per 90 days:  Full Face Cushion, (3 per 90 days)     Order Specific Question:   Choose EITHER Heated or Non-Heated Tubjing     Answer:    Non-Heated Tubing, 1 per 90 days     Order Specific Question:   All other supplies as needed as listed below:     Answer:    Headgear, 1 per 180 days     Order Specific Question:   All other supplies as needed as listed below:     Answer:    Disposable Filter, 6 per 90 days     Order Specific Question:   All other supplies as needed as listed below:     Answer:    Non-Disposable Filter, 1 per 180 days     Order Specific Question:   All other supplies as needed as listed below:     Answer:    Humidifier Chamber, 1 per 180 days     Plan:        Problem List Items Addressed This Visit        Other    MOSHE (obstructive sleep apnea) - Primary    Overview     AHI 23/hr           Relevant Orders    CPAP/BIPAP SUPPLIES      Other Visit Diagnoses     Morbid obesity with BMI of 45.0-49.9, adult        Lack of adequate sleep        Shifting sleep-work schedule          20lb weight loss from 2020. Continue weight loss.   Wear PAP daily over 4 hours.   Try to obtain more sleep time.  Follow up 6 months

## 2022-08-18 ENCOUNTER — TELEPHONE (OUTPATIENT)
Dept: INTERNAL MEDICINE | Facility: CLINIC | Age: 42
End: 2022-08-18

## 2022-08-18 ENCOUNTER — OFFICE VISIT (OUTPATIENT)
Dept: INTERNAL MEDICINE | Facility: CLINIC | Age: 42
End: 2022-08-18
Payer: COMMERCIAL

## 2022-08-18 VITALS
HEIGHT: 72 IN | OXYGEN SATURATION: 96 % | BODY MASS INDEX: 42.66 KG/M2 | SYSTOLIC BLOOD PRESSURE: 130 MMHG | TEMPERATURE: 97 F | WEIGHT: 315 LBS | HEART RATE: 77 BPM | DIASTOLIC BLOOD PRESSURE: 84 MMHG

## 2022-08-18 DIAGNOSIS — G47.33 OSA (OBSTRUCTIVE SLEEP APNEA): ICD-10-CM

## 2022-08-18 DIAGNOSIS — E66.01 MORBID OBESITY WITH BMI OF 50.0-59.9, ADULT: ICD-10-CM

## 2022-08-18 DIAGNOSIS — I10 ESSENTIAL HYPERTENSION: Primary | ICD-10-CM

## 2022-08-18 DIAGNOSIS — E88.819 INSULIN RESISTANCE: ICD-10-CM

## 2022-08-18 DIAGNOSIS — N18.31 STAGE 3A CHRONIC KIDNEY DISEASE: ICD-10-CM

## 2022-08-18 PROCEDURE — 3079F DIAST BP 80-89 MM HG: CPT | Mod: CPTII,S$GLB,, | Performed by: INTERNAL MEDICINE

## 2022-08-18 PROCEDURE — 3044F PR MOST RECENT HEMOGLOBIN A1C LEVEL <7.0%: ICD-10-PCS | Mod: CPTII,S$GLB,, | Performed by: INTERNAL MEDICINE

## 2022-08-18 PROCEDURE — 3075F PR MOST RECENT SYSTOLIC BLOOD PRESS GE 130-139MM HG: ICD-10-PCS | Mod: CPTII,S$GLB,, | Performed by: INTERNAL MEDICINE

## 2022-08-18 PROCEDURE — 3044F HG A1C LEVEL LT 7.0%: CPT | Mod: CPTII,S$GLB,, | Performed by: INTERNAL MEDICINE

## 2022-08-18 PROCEDURE — 99214 PR OFFICE/OUTPT VISIT, EST, LEVL IV, 30-39 MIN: ICD-10-PCS | Mod: S$GLB,,, | Performed by: INTERNAL MEDICINE

## 2022-08-18 PROCEDURE — 3075F SYST BP GE 130 - 139MM HG: CPT | Mod: CPTII,S$GLB,, | Performed by: INTERNAL MEDICINE

## 2022-08-18 PROCEDURE — 3008F PR BODY MASS INDEX (BMI) DOCUMENTED: ICD-10-PCS | Mod: CPTII,S$GLB,, | Performed by: INTERNAL MEDICINE

## 2022-08-18 PROCEDURE — 4010F PR ACE/ARB THEARPY RXD/TAKEN: ICD-10-PCS | Mod: CPTII,S$GLB,, | Performed by: INTERNAL MEDICINE

## 2022-08-18 PROCEDURE — 1160F RVW MEDS BY RX/DR IN RCRD: CPT | Mod: CPTII,S$GLB,, | Performed by: INTERNAL MEDICINE

## 2022-08-18 PROCEDURE — 3079F PR MOST RECENT DIASTOLIC BLOOD PRESSURE 80-89 MM HG: ICD-10-PCS | Mod: CPTII,S$GLB,, | Performed by: INTERNAL MEDICINE

## 2022-08-18 PROCEDURE — 1159F PR MEDICATION LIST DOCUMENTED IN MEDICAL RECORD: ICD-10-PCS | Mod: CPTII,S$GLB,, | Performed by: INTERNAL MEDICINE

## 2022-08-18 PROCEDURE — 3008F BODY MASS INDEX DOCD: CPT | Mod: CPTII,S$GLB,, | Performed by: INTERNAL MEDICINE

## 2022-08-18 PROCEDURE — 1159F MED LIST DOCD IN RCRD: CPT | Mod: CPTII,S$GLB,, | Performed by: INTERNAL MEDICINE

## 2022-08-18 PROCEDURE — 99214 OFFICE O/P EST MOD 30 MIN: CPT | Mod: S$GLB,,, | Performed by: INTERNAL MEDICINE

## 2022-08-18 PROCEDURE — 99999 PR PBB SHADOW E&M-EST. PATIENT-LVL IV: ICD-10-PCS | Mod: PBBFAC,,, | Performed by: INTERNAL MEDICINE

## 2022-08-18 PROCEDURE — 4010F ACE/ARB THERAPY RXD/TAKEN: CPT | Mod: CPTII,S$GLB,, | Performed by: INTERNAL MEDICINE

## 2022-08-18 PROCEDURE — 99999 PR PBB SHADOW E&M-EST. PATIENT-LVL IV: CPT | Mod: PBBFAC,,, | Performed by: INTERNAL MEDICINE

## 2022-08-18 PROCEDURE — 1160F PR REVIEW ALL MEDS BY PRESCRIBER/CLIN PHARMACIST DOCUMENTED: ICD-10-PCS | Mod: CPTII,S$GLB,, | Performed by: INTERNAL MEDICINE

## 2022-08-18 NOTE — PROGRESS NOTES
Subjective:       Patient ID: Juan Pablo Saeed is a 41 y.o. male.    Chief Complaint: Follow-up    HPI  Following up on chronic issues.  BP stable, taking meds.  Continues metformin for insulin resistance.  Tolerating well.  Struggling with weight.  Would like to have more success with weight loss.  Discussed lifestyle wellness clinic.  Discussed long term possibility of bariatrics if not successful.  Interested in lifestyle wellness.      Review of Systems    Objective:   /84   Pulse 77   Temp 97.3 °F (36.3 °C) (Temporal)   Ht 6' (1.829 m)   Wt (!) 166.5 kg (367 lb 1.1 oz)   SpO2 96%   BMI 49.78 kg/m²      Physical Exam  Vitals reviewed.   Constitutional:       Appearance: He is well-developed.   HENT:      Head: Normocephalic and atraumatic.      Right Ear: External ear normal.      Left Ear: External ear normal.   Eyes:      Pupils: Pupils are equal, round, and reactive to light.   Neck:      Thyroid: No thyromegaly.   Cardiovascular:      Rate and Rhythm: Normal rate and regular rhythm.      Heart sounds: Normal heart sounds. No murmur heard.    No friction rub. No gallop.   Pulmonary:      Effort: Pulmonary effort is normal.      Breath sounds: Normal breath sounds. No wheezing or rales.   Abdominal:      General: Bowel sounds are normal. There is no distension.      Palpations: Abdomen is soft.      Tenderness: There is no abdominal tenderness.   Musculoskeletal:      Cervical back: Neck supple.   Psychiatric:         Mood and Affect: Mood normal.         No visits with results within 2 Week(s) from this visit.   Latest known visit with results is:   Lab Visit on 04/18/2022   Component Date Value    WBC 04/18/2022 4.97     RBC 04/18/2022 5.38     Hemoglobin 04/18/2022 14.1     Hematocrit 04/18/2022 44.2     MCV 04/18/2022 82     MCH 04/18/2022 26.2 (A)    MCHC 04/18/2022 31.9 (A)    RDW 04/18/2022 13.6     Platelets 04/18/2022 279     MPV 04/18/2022 10.6     Immature Granulocytes  04/18/2022 0.4     Gran # (ANC) 04/18/2022 2.2     Immature Grans (Abs) 04/18/2022 0.02     Lymph # 04/18/2022 2.1     Mono # 04/18/2022 0.5     Eos # 04/18/2022 0.1     Baso # 04/18/2022 0.04     nRBC 04/18/2022 0     Gran % 04/18/2022 44.4     Lymph % 04/18/2022 41.9     Mono % 04/18/2022 10.9     Eosinophil % 04/18/2022 1.6     Basophil % 04/18/2022 0.8     Differential Method 04/18/2022 Automated     Sodium 04/18/2022 139     Potassium 04/18/2022 4.2     Chloride 04/18/2022 103     CO2 04/18/2022 25     Glucose 04/18/2022 88     BUN 04/18/2022 15     Creatinine 04/18/2022 1.6 (A)    Calcium 04/18/2022 10.0     Total Protein 04/18/2022 8.0     Albumin 04/18/2022 4.0     Total Bilirubin 04/18/2022 1.0     Alkaline Phosphatase 04/18/2022 55     AST 04/18/2022 27     ALT 04/18/2022 26     Anion Gap 04/18/2022 11     eGFR if African American 04/18/2022 >60.0     eGFR if non African Amer* 04/18/2022 52.4 (A)    Hemoglobin A1C 04/18/2022 5.8 (A)    Estimated Avg Glucose 04/18/2022 120     Cholesterol 04/18/2022 181     Triglycerides 04/18/2022 250 (A)    HDL 04/18/2022 31 (A)    LDL Cholesterol 04/18/2022 100.0     HDL/Cholesterol Ratio 04/18/2022 17.1 (A)    Total Cholesterol/HDL Ra* 04/18/2022 5.8 (A)    Non-HDL Cholesterol 04/18/2022 150     TSH 04/18/2022 2.004        Assessment:       1. Essential hypertension    2. Insulin resistance    3. Morbid obesity with BMI of 50.0-59.9, adult    4. MOSHE (obstructive sleep apnea)    5. Stage 3a chronic kidney disease        Plan:   Essential hypertension  Blood pressure is under good control.  We will continue the current regimen.  Will work on regular aerobic exercise and a low salt diet.        Insulin resistance  Continue current meds, consider switch to ozempic.  Refer to lifestyle wellness    Morbid obesity with BMI of 50.0-59.9, adult  Refer to lifestyle and wellness.    MOSHE (obstructive sleep apnea)  Continues to wear cpap.   Struggles some.  Overall improving.    Essential hypertension    Insulin resistance  -     Ambulatory referral/consult to [facility]; Future; Expected date: 08/25/2022  -     Hemoglobin A1C; Future; Expected date: 10/16/2022    Morbid obesity with BMI of 50.0-59.9, adult  -     Ambulatory referral/consult to [facility]; Future; Expected date: 08/25/2022    MOSHE (obstructive sleep apnea)    Stage 3a chronic kidney disease  Comments:  avoid nsaids, stay well hydrated.          Follow up in about 6 months (around 2/18/2023).

## 2022-08-18 NOTE — PATIENT INSTRUCTIONS
Patient Education       Acid Reflux and GERD in Adults Discharge Instructions   About this topic   GERD stands for gastroesophageal reflux disease. It is sometimes called reflux or acid reflux. Acid reflux happens when your stomach acid backs up into your esophagus, the tube that carries your food from your mouth to your stomach. This can be uncomfortable. You may have stomach or chest pain (heartburn), trouble swallowing, or an upset stomach. Some people have a cough or sore throat.  Most of the time, you can use over-the-counter medicines to help with this problem.       What care is needed at home?   Ask your doctor what you need to do when you go home. Make sure you ask questions if you do not understand what the doctor says.  Raise the head of your bed by 6 to 8 inches (15 to 20 cm). Use wood or rubber blocks under 2 legs or try a foam wedge under your mattress. Just sleeping with your head raised on pillows is not enough.  Avoid beer, wine, and mixed drinks and avoid caffeine.  Keep a healthy weight. If you are too heavy, lose weight.  If you smoke, try to quit. Your doctor or nurse can help.  Keep a diary of your signs. Write down what you had to eat before you had reflux. This will help you learn which foods cause you problems. For some people, they need to avoid coffee, chocolate, alcohol, spicy or fatty foods, or peppermint.  Avoid eating for 2 to 3 hours before bedtime. Lying down after you eat can make reflux worse.  Avoid belts and clothes that are too tight.  What follow-up care is needed?   Your doctor may ask you to make visits to the office to check on your progress. Be sure to keep these visits.  What drugs may be needed?   The doctor may order drugs to:  Relieve heartburn  Prevent reflux  Lessen acid production  Heal the esophageal lining  Will physical activity be limited?   Your physical activities will not be limited.  What problems could happen?   Asthma  Precancerous changes in the food  pipe  Long-term cough  Dental problems  Higher risk of cancer of the food pipe. This is esophageal cancer.  Narrowing of the food pipe. This is a stricture.  Open sore in the food pipe. This is an ulcer.  When do I need to call the doctor?   You have signs of a heart attack, which may include:  Severe chest pain, pressure, or discomfort with:  Breathing trouble, sweating, upset stomach, or cold, clammy skin.  Pain in your arms, back, or jaw.  Worse pain with activity like walking up stairs.  Fast or irregular heartbeat.  Feeling dizzy, faint, or weak.  You have sudden, severe belly pain or the belly pain is constant.  You have blood in the undigested food and acid that comes up, or stool that looks red, black, or like tar.  You feel like your food gets stuck or you have pain when you swallow.  You lose weight when you are not trying to.  You choke when you are eating.  Your reflux is very bad, very frequent, or not helped by over-the-counter medicines.  You keep throwing up.  Teach Back: Helping You Understand   The Teach Back Method helps you understand the information we are giving you. After you talk with the staff, tell them in your own words what you learned. This helps to make sure the staff has described each thing clearly. It also helps to explain things that may have been confusing. Before going home, make sure you can do these:  I can tell you about my condition.  I can tell you what changes I need to make with my eating habits to ease the reflux.  I can tell you what I will do if I am throwing up fluid that looks like blood or coffee grounds.  Where can I learn more?   American Academy of Family Physicians  [email or web address]   NHS Choices  [email or web address]://[email or web address].uk/conditions/heartburn-[email or web address]-acid-[email or web address]   Last Reviewed Date   2021-06-09  Consumer Information Use and Disclaimer   This information is not specific medical advice and does not replace information you receive from  your health care provider. This is only a brief summary of general information. It does NOT include all information about conditions, illnesses, injuries, tests, procedures, treatments, therapies, discharge instructions or life-style choices that may apply to you. You must talk with your health care provider for complete information about your health and treatment options. This information should not be used to decide whether or not to accept your health care providers advice, instructions or recommendations. Only your health care provider has the knowledge and training to provide advice that is right for you.  Copyright   Copyright © 2021 [employer] and its affiliates and/or licensors. All rights reserved.

## 2022-08-24 ENCOUNTER — OFFICE VISIT (OUTPATIENT)
Dept: PRIMARY CARE CLINIC | Facility: CLINIC | Age: 42
End: 2022-08-24
Payer: COMMERCIAL

## 2022-08-24 VITALS
OXYGEN SATURATION: 100 % | DIASTOLIC BLOOD PRESSURE: 90 MMHG | BODY MASS INDEX: 49.33 KG/M2 | WEIGHT: 315 LBS | SYSTOLIC BLOOD PRESSURE: 156 MMHG | HEART RATE: 94 BPM | RESPIRATION RATE: 18 BRPM

## 2022-08-24 DIAGNOSIS — E66.01 MORBID OBESITY WITH BMI OF 50.0-59.9, ADULT: ICD-10-CM

## 2022-08-24 DIAGNOSIS — N18.31 STAGE 3A CHRONIC KIDNEY DISEASE: ICD-10-CM

## 2022-08-24 DIAGNOSIS — E78.1 HYPERTRIGLYCERIDEMIA: ICD-10-CM

## 2022-08-24 DIAGNOSIS — E88.819 INSULIN RESISTANCE: ICD-10-CM

## 2022-08-24 DIAGNOSIS — I10 ESSENTIAL HYPERTENSION: Primary | ICD-10-CM

## 2022-08-24 PROCEDURE — 4010F ACE/ARB THERAPY RXD/TAKEN: CPT | Mod: CPTII,S$GLB,, | Performed by: PHYSICIAN ASSISTANT

## 2022-08-24 PROCEDURE — 1159F MED LIST DOCD IN RCRD: CPT | Mod: CPTII,S$GLB,, | Performed by: PHYSICIAN ASSISTANT

## 2022-08-24 PROCEDURE — 3077F SYST BP >= 140 MM HG: CPT | Mod: CPTII,S$GLB,, | Performed by: PHYSICIAN ASSISTANT

## 2022-08-24 PROCEDURE — 99999 PR PBB SHADOW E&M-EST. PATIENT-LVL IV: ICD-10-PCS | Mod: PBBFAC,,, | Performed by: PHYSICIAN ASSISTANT

## 2022-08-24 PROCEDURE — 3044F PR MOST RECENT HEMOGLOBIN A1C LEVEL <7.0%: ICD-10-PCS | Mod: CPTII,S$GLB,, | Performed by: PHYSICIAN ASSISTANT

## 2022-08-24 PROCEDURE — 1159F PR MEDICATION LIST DOCUMENTED IN MEDICAL RECORD: ICD-10-PCS | Mod: CPTII,S$GLB,, | Performed by: PHYSICIAN ASSISTANT

## 2022-08-24 PROCEDURE — 3077F PR MOST RECENT SYSTOLIC BLOOD PRESSURE >= 140 MM HG: ICD-10-PCS | Mod: CPTII,S$GLB,, | Performed by: PHYSICIAN ASSISTANT

## 2022-08-24 PROCEDURE — 4010F PR ACE/ARB THEARPY RXD/TAKEN: ICD-10-PCS | Mod: CPTII,S$GLB,, | Performed by: PHYSICIAN ASSISTANT

## 2022-08-24 PROCEDURE — 99215 PR OFFICE/OUTPT VISIT, EST, LEVL V, 40-54 MIN: ICD-10-PCS | Mod: S$GLB,,, | Performed by: PHYSICIAN ASSISTANT

## 2022-08-24 PROCEDURE — 3008F PR BODY MASS INDEX (BMI) DOCUMENTED: ICD-10-PCS | Mod: CPTII,S$GLB,, | Performed by: PHYSICIAN ASSISTANT

## 2022-08-24 PROCEDURE — 3080F PR MOST RECENT DIASTOLIC BLOOD PRESSURE >= 90 MM HG: ICD-10-PCS | Mod: CPTII,S$GLB,, | Performed by: PHYSICIAN ASSISTANT

## 2022-08-24 PROCEDURE — 3008F BODY MASS INDEX DOCD: CPT | Mod: CPTII,S$GLB,, | Performed by: PHYSICIAN ASSISTANT

## 2022-08-24 PROCEDURE — 3080F DIAST BP >= 90 MM HG: CPT | Mod: CPTII,S$GLB,, | Performed by: PHYSICIAN ASSISTANT

## 2022-08-24 PROCEDURE — 3044F HG A1C LEVEL LT 7.0%: CPT | Mod: CPTII,S$GLB,, | Performed by: PHYSICIAN ASSISTANT

## 2022-08-24 PROCEDURE — 99999 PR PBB SHADOW E&M-EST. PATIENT-LVL IV: CPT | Mod: PBBFAC,,, | Performed by: PHYSICIAN ASSISTANT

## 2022-08-24 PROCEDURE — 99215 OFFICE O/P EST HI 40 MIN: CPT | Mod: S$GLB,,, | Performed by: PHYSICIAN ASSISTANT

## 2022-08-24 RX ORDER — SEMAGLUTIDE 1.34 MG/ML
0.25 INJECTION, SOLUTION SUBCUTANEOUS
Qty: 1 PEN | Refills: 5 | Status: SHIPPED | OUTPATIENT
Start: 2022-08-24 | End: 2023-08-02

## 2022-08-24 NOTE — PROGRESS NOTES
Subjective:       Patient ID: Juan Pablo Saeed is a 41 y.o. male.    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   Starting to get better into a routine   Diet: southern foods, potatoes, starches, comfort foods   Sleep: shift work, poor   Stress: moderate/high   Overall wellbeing: good   Social support: good   Barriers to goals: timing/shift work      Weight goal 10% wt loss     Wt Readings from Last 3 Encounters:   08/24/22 (!) 165 kg (363 lb 12.1 oz)   08/18/22 (!) 166.5 kg (367 lb 1.1 oz)   06/02/22 (!) 164.3 kg (362 lb 3.5 oz)       Current stage of change pre contemplation   Next stage of change contemplation       Current Outpatient Medications   Medication Instructions    acetaminophen (TYLENOL) 325 mg, Oral, Every 6 hours PRN    atorvastatin (LIPITOR) 20 mg, Oral, Daily    gabapentin (NEURONTIN) 300 MG capsule Take 1 capsule by mouth twice daily    lisinopriL (PRINIVIL,ZESTRIL) 40 mg, Oral, Daily    metFORMIN (GLUCOPHAGE) 500 mg, Oral, 2 times daily with meals    multivitamin with minerals tablet 1 tablet, Oral    OZEMPIC 0.25 mg, Subcutaneous, Every 7 days    vitamin E 8.9 unit/g Crea Apply to affected area twice daily            Review of Systems   Constitutional: Negative for fatigue, fever and unexpected weight change.   HENT: Negative for sore throat and trouble swallowing.    Respiratory: Negative for  cough and shortness of breath.    Cardiovascular: Negative for chest pain.   Gastrointestinal: Negative for abdominal pain.   Hematological: Negative for adenopathy. Does not bruise/bleed easily.   All other systems reviewed and are negative.      Objective:   BP (!) 156/90   Pulse 94   Resp 18   Wt (!) 165 kg (363 lb 12.1 oz)   SpO2 100%   BMI 49.33 kg/m²      Physical Exam  Constitutional:       Appearance: Normal appearance. He is obese.   HENT:      Head: Normocephalic and atraumatic.   Pulmonary:      Effort: Pulmonary effort is normal.   Neurological:      General: No focal deficit present.      Mental Status: He is alert.   Psychiatric:         Mood and Affect: Mood normal.         Behavior: Behavior normal.           Lab Results   Component Value Date    WBC 4.97 04/18/2022    HGB 14.1 04/18/2022    HCT 44.2 04/18/2022     04/18/2022    CHOL 181 04/18/2022    TRIG 250 (H) 04/18/2022    HDL 31 (L) 04/18/2022    ALT 26 04/18/2022    AST 27 04/18/2022     04/18/2022    K 4.2 04/18/2022     04/18/2022    CREATININE 1.6 (H) 04/18/2022    BUN 15 04/18/2022    CO2 25 04/18/2022    TSH 2.004 04/18/2022    HGBA1C 5.8 (H) 04/18/2022       Assessment:       1. Essential hypertension    2. Insulin resistance    3. Morbid obesity with BMI of 50.0-59.9, adult    4. Stage 3a chronic kidney disease    5. Hypertriglyceridemia        Plan:   Essential hypertension  -     semaglutide (OZEMPIC) 0.25 mg or 0.5 mg(2 mg/1.5 mL) pen injector; Inject 0.25 mg into the skin every 7 days.  Dispense: 1 pen; Refill: 5    Insulin resistance  -     Ambulatory referral/consult to [facility]  -     semaglutide (OZEMPIC) 0.25 mg or 0.5 mg(2 mg/1.5 mL) pen injector; Inject 0.25 mg into the skin every 7 days.  Dispense: 1 pen; Refill: 5    Morbid obesity with BMI of 50.0-59.9, adult  -     Ambulatory referral/consult to [facility]  -     semaglutide (OZEMPIC) 0.25 mg or 0.5 mg(2 mg/1.5  mL) pen injector; Inject 0.25 mg into the skin every 7 days.  Dispense: 1 pen; Refill: 5    Stage 3a chronic kidney disease  -     semaglutide (OZEMPIC) 0.25 mg or 0.5 mg(2 mg/1.5 mL) pen injector; Inject 0.25 mg into the skin every 7 days.  Dispense: 1 pen; Refill: 5    Hypertriglyceridemia        Patient would greatly benefit from wt loss  Shopping list   Fresh fruits/veggies/whole grains   LOW SODIUM for renal dz/htn   This means cutting back or out fried foods/processed   Nutrition guide handouts with meal/snack ideas  70 oz of water a day at least   Avoid sugared beverages!!   Gym on days he can 30+ mins     Time spent: 60 minutes in face to face and with review prior and after of chart notes from specialists, in regards to diagnosis, prognosis, review of lab and test results, benefits of treatment as well as management of disease, counseling of patient and coordination of care between various health care providers .

## 2022-08-24 NOTE — PATIENT INSTRUCTIONS
"      PRODUCE  [] All fresh fruit   [] All fresh vegetables   [] All fresh herbs  [] All herb purees + pastes  [] Pre-spiralized vegetable noodles   [] Steam-In-The-Bag begetables  [] Riced cauliflower  [] Jicama sticks  [] Love Beets  all varieties  [] Wholly Guacamole  all varieties  [] Hummus  all varieties, chickpea + vegetable  [] Tofu Shirataki noodles    [] Tofu  all varieties  [] Tempeh  all varieties    PROTEIN  CHICKEN   [] Boneless, skinless breasts  [] Boneless, skinless thighs  [] Ground chicken breast, at least 93% lean  [] Chicken breast cutlet  [] Aidell's  Chicken Sausage + Chicken Meatballs    TURKEY   [] Turkey breast tenderloin   [] Ground turkey breast, at least 93% lean  [] [place] Naturals  Turkey Sausage    BEEF  [] Tenderloin  [] Sirloin  [] Top Loin  [] Flank Steak  [] Round Steak  [] Filet  [] Lean ground beef, at least 93% lean + grass-fed preferable    PORK  [] Tenderloin  [] Pork Chop  [] Center Cut  [] [place] Naturals  No-Sugar Bond    BISON  [] Berino  90 - 95% lean    SEAFOOD  [] All fresh fish + seafood; locally sourced when possible  [] Smoked salmon    HEAT + EAT ENTREES   [] Azar's Natural Foods  Chicken, Pork, Beef  [] Juan  "All Natural" Grilled Chicken Breast + Strips, all varieties    SAUCES SPREADS + DIPS  [] Bitchin Sauce  Original, Chipotle, Cilantro [place]  [] Kayla's Kitchen  Tzatziki Yogurt Dip, Babaganoush, Hummus  [] Wholly Guacamole  all varieties  [] Hummus  all varieties  [] Boomer Gringo Salsa  all varieties  [] Mrs. Manuel's Salsa  all varieties  [] Stubb's All Natural BBQ Sauce  [] Primal Kitchen  Bhandari, Ketchup, BBQ Sauce  [] Primal Kitchen Pasta Sauce  Roasted Garlic, Tomato Basil, no-dairy Vodka Sauce  [] Sal & Kirstin's  HeartSmart Pasta Sauce    DAIRY/DAIRY SUBSTITUTES/EGGS  EGGS   [] All eggs  cage-free, pasture-raise preferable  [] Creludyi  egg wraps  [] Vital Farms  Pasture-Raised Egg Bites  [] JUST Egg [vegan]     CREAMERS "   [] Califia  Better Half, original + vanilla unsweetened  [] NutPods  all varieties    MILK   [] Horizon Organic  all varieties except chocolate  [] Organic Valley  all varieties except chocolate  [] Organic Valley  ultra-filtered, reduced fat milk     PLANT_BASED MILK ALTERNATIVES  [] All unsweetened almond milks  original, vanilla + chocolate  [] Ripple  unsweetened   [] Milkadamia  original +_ vanilla, unsweetened   [] Forager  original + vanilla, unsweetened   [] Silk Organic  soy milk, unsweetened  [] Oatly  unsweetened  [] Califia  regular + protein-fortified oat milk, unsweetened     CHEESES  [] Regular or reduced fat cheeses  [] BelGioso  Fresh Mozzarella Snack Packs, Parmesan Power-full Snack   [] Goat cheese  [] Fresh mozzarella  [] String cheese  all varieties  [] Neisha Cottage Cheese  [] Shannen's Cultured Cottage Cheese  [] Margy Life 'Just Like Mozzarella'  plant-based shreds and other varieties  [] Parmela Creamery  plant-based shredded cheese    YOGURT  [] Fage  2% low-fat, plain  [] Siggi's  plain, vanilla  [] Chobani Greek  nonfat + whole milk yogurt, plain   [] Chobani Less Sugar  all flavored varieties   [] Oikos Greek  nonfat, plain  [] Two Good  all varieties   [] [facility] Provisions  plain  [] [place] Organic  low-fat + nonfat, plain  [] [facility]  goat milk yogurt, plain  [] Kefit  unsweetened, plain  [] Forager  Greek style unsweetened, plain [dairy-free]  [] [place]  unsweetened Greek style, plain [dairy-free]  [] [facility]  almond milk yogurt, vanilla or plain, unsweetened [dairy-free]    FREEZER SECTION  FROZEN VEGGIES  [] All plain frozen veggies + greens [e.g. broccoli, brussels, carrots, okra, mushrooms, zucchini, yellow squash, butternut squash, kale, spinach, cheryl greens]  [] Riced veggies [e.g. cauliflower, broccoli, butternut squash]  [] Edamame  all varieties  [] Green Giant  [] Veggie Spirals  [] Marinated Veggies [e.g. eggplant, peppers,  zucchini]  [] Simply Steam Caney Sprouts  [] Birds Eye  [] Power Blend Italian Style  lentils, broccoli, kale, zucchini  []  Caney Sprouts & Carrots  [] Oven Roasters Broccoli & Cauliflower  [] [place] Blend  [] Tattooed   [] Green Bean Blend  [] Farmer's Market Ratatouille  [] Butter Balsamic Glazed Vegetables  [] Riced Cauliflower & Quinoa Mediterranean Style  [] Crissy's Good Life  Southern Style Greens [sauteed kale + onion]    FROZEN FRUITS  [] All unsweetened frozen fruits  all varieties  [] Dole Fruits & Veggie Blends  Berries 'n Kale  [] Dole Mix-ins  Triple Berry     FROZEN ENTREES  [] The Good Kitchen meals  all varieties [ e.g. Chili Lime Chicken Over Riced Cauliflower]  [] Premium Paleo  Not Eric Momma's Meatloaf  [] Primal Kitchen  Chicken Pesto + Steak Fajitas w/ Peppers & Onions  [] Eating Well Frozen Entrees  Butter Chicken Masala, Steak Carne Asada, Creamy Pesto Chicken, Chicken + Wild Rice Stroganoff, Yellow Bernabe Chicken, Sun-dried Tomato Chicken, Chicken Lo Mein  [] Realgood Entree Bowls  [place] citizen Inspired Beef Bowl over Riced Cauliflower, Chicken Burrito Bowl   [] Great Karma Coconut Bernabe  [] Madison's  [place] with Black Beans, Vegetable Lasagna  [] Kashi Mayan [place] Bake  [] Healthy Choice  Simply Steamers Chicken Fried Rice  [] Basil Pesto Chicken & Cameroonian Style Pork Power Bowls  [] Tattooed   Enchilada Bowl  [] Ricky Farms  Spicy Black Bean Burgers    FROZEN PIZZAS  [] Cauli'flour Foods  Cauliflower Pizza Crusts  [] Outer Aisle  Cauliflower Crust  [] Madison's  Veggie Crust Cheese Pizza  [] Quest Pizza     VEGETARIAN PRODUCTS  [] Beyond Meat  ground 'meat' + grilled 'chicken' strips  [] Tofurkey  Original Italian Sausage + Original Tempeh  [] Gardein  Beefless Ground + Meatless Meatballs  [] Ricky Farms Grillers  Original Burger, Crumbles, Meatballs    ICE CREAMS + FROZEN DESSERTS  [] Halo Top  regular + keto series, pops  [] Raymon   ice cream + dessert sandwiches  [] Enlightened  ice cream + bars  [] Nightfood  ice cream  [] Realgood  ice cream  [] Arctic Zero Fit  frozen pint  [] The Frozen Farmer  sorbets  [] Wholly Rollies  Protein Balls, all varieties  [] Dream Pops  Coconut Latte    FROZEN BREAKFASTS  [] Realgood  Breakfast Sandwiches on Cauliflower Cheesy Bread  [] Rebel  ice cream + dessert sandwiches  [] Enlightened  ice cream + bars  [] Nightfood  ice cream  [] Realgood  ice cream  [] Arctic Zero Fit  frozen pint  [] The Frozen Farmer  sorbets  [] Wholly Rollies  Protein Balls, all varieties  [] Dream Pops  Coconut Latte    BREADS/BUNS/WRAPS  [] Sesar Bread: All Types - In Freezer Section   Juliano's killer 21 bread   [] Flat Out Light Wraps - All Varieties   [] Flat Out Protein Up Carb Down Flat Bread   [] Kontos Whole Wheat Pocket Sara   [] Carroll DAVID 100% Whole Wheat Tortillas   [] LaTortilla Factory Tortillas - Smart & Delicious; 50 or 80-calorie   [] Nature's Own 100% Whole Wheat Bread   [] Orowheat Healthful - 100% Whole Wheat Slice Bread and Good Thunder Thins   [] Orowheat Healthful - Whole Wheat Nuts & Grain Bread; Flax & Seed Bread   [] Pepperidge Farm Natural Whole Grain 15 Bread   [] Pepperidge Farm Natural Whole Grain English Muffin - 100% Whole Wheat   [] Pepperidge Farm Very Thin 100% Whole Wheat   [] Danielle Whitaker 45 Calories and Delightful   [] Arjun 100% Whole Wheat Thin-Sliced Bagels and English Muffins   [] Western Bagel: Perfect 10     GLUTEN FREE  [] Tab's Gluten Free Bread   [] Disney Bakehouse 7-Grain Gluten Free Bread     LEGUME PASTA   [] Explore Asian Organic Black Bean Spaghetti   [] Modern Table   [] Tolerant Foods       NUT BUTTERS & JELLIES    NUT BUTTERS   [] Better'n Chocolate: Coconut Chocolate Peanut Butter Spread   [] Better'n Peanut Butter - All Types   [] Earth Balance Coconut and Peanut Spread   [] Abhijeet's Nut [place]   [] MaraNatha: All Natural Roasted Cashew Butter - [place] or Creamy    [] MaraNatha: Roasted Peanut Butter   [] Nuts 'N More Peanut [place] - All Flavors   [] PB2 Powder - Original or Chocolate   [] Skippy Natural - Creamy, Super Chunk   [] Smart Balance Peanut Butter - Shannon or Creamy   [] Peanut Butter & Company:   [] Smooth , Crunch Time, The Heat Is On, Old Fashioned Smooth, Mighty Nut- Powdered Peanut Butter, Squeeze Pack   [] Smucker's Natural Peanut Butter - [place] or Creamy   [] Sunbutter Nut Butter   [] Wild Friends Protein Peanut Butter/[place] o Butter - Vanilla or Chocolate     JELLIES  o Polaner's All Fruit   o Clearly Organic Best Choice: Strawberry Fruit Spread       SNACKS    BARS  [] Kashi Bars - Chewy or Crunchy; Honey [place] o Flax or Peanut Butter   [] KIND Bars - 5 Grams of Sugar or Less   [] KIND Protein Bars - Strong and KIND   [] [facility] Protein Bar - All Varieties   [] Nature Valley Roasted Nut Crunch - [place] Crunch; Peanut Crunch   [] [facility] Simple Nut Bar - Roasted Peanut & Honey   [] [facility] Simple Nut Bar - [place], Cashew & Sea Salt   [] [facility] Nut Callahan Bar - Salted Caramel Peanut   [] Think Thin Protein Bars   [] Quest Bars, Power Crunch Bars, Pure Protein Bars     BEEF JERKY - NITRATE FREE   [] Game On   [] Grass Run Farms   [] Krave   [] Ostrim   [] Perky Jerky   [] Primal Strips Meatless Vegan Jerky   [] [place]     CHIPS   [] Beanitos Chips   [] Fruit Crisps - e.g. Brother's-All-Natural, Bare Fruit, Yoga Chips   [] Dinah's Soy Crisps: 1.3 ounce bag   [] Quest Protein Chips   [] Wasa Whole Wheat Crisp Bread     CRACKERS  [] Rama's Gone Crackers   [] Nabisco Triscuit: Regular and Thin Crisp Crackers   [] Vans Say Cheese Crackers (G-F)     POPCORN/NUTS   [] Jamie Briggs's Smart Pop Popcorn - Single Serving   [] 100-Calorie Pack of Nuts - All Varieties     PROTEIN POWDERS & DRINKS  []  Protein -  Whey Protein Powder   [] Garden of Life Raw Protein Powder   [] Iconic Ready-To-Drink Protein Drink    [] Lin One Protein Powder   [] VegaSport Protein Powder     SALSA/HUMMUS/DIPS   [] Eat Well Embrace Life: Zesty Sriracha Carrot o Hummus   [] Pre-Portioned Guacamole Packs   [] Campbell's   [] Tostitos Restaurant Style Salsa       SOUPS   [] Madison's Organic Soups - Lentil, Vegetable, Split Pea, Low-Sodium     CANNED GOODS   [] 100% Pure Pumpkin   [] BlueRunner Creole Cream-Style Red Beans or Navy Beans   [] Cajun Power Chicken Gumbo Base   [] Chicken of the [place]   [] Petra Fresh Cut Sliced Beets   [] Hormel Breast of Chicken in Water   [] LeSuer Tender Baby Whole Carrots   [] nUa Tabasco [place] Starter   [] StarKist: Chunk Lite Tuna in Water, Gourmet Select Pouches   [] StarKist: Yellowfin Tuna Fillets   [] Trappey's: Kidney, Butter, Snyder, Black Eye, Field, and Black Beans   [] ALICIA Caicedo's Turnip Greens or Payam Spinach     CONDIMENTS/ SAUCES/SPREADS/ SPICES  [] Nile Lopez's Magic Seasonings - Regular or Salt Free   [] Gifty Camarillo's Sauces - All Flavors   [] Laughing Cow Light - All Flavors   [] Dash Salt-Free Marinade - All Flavors   [] Ian & Kirstin's: Heart Smart Pasta Sauces   [] Tabasco     SALAD DRESSINGS  [] Radha's Naturals: Lite Honey Mustard   [] Irvin's Own: Lighten Up Salad Dressing - All Varieties   [] OPA Greek Yogurt Dressings - Ranch, Blue o Cheese, Caesar, Feta Dill     SWEETENERS  [] Sweet Pyote Sweetener   [] Swerve   [] Truvia     BEVERAGES  [] Coconut Water   [] Crystal Light PURE - All Flavors   [] Honest Tea: Just Green Tea, Unsweetened   [] Kombucha Tea   [] [place]   [] [facility] Bloody Rama Mix   [] Metromint - Zero-Sugar; All Natural Flavored   [] Star - Plain or Flavored   [] Juany Blackmon   [] Steaz - Zero-Sugar, All-Natural, Sparkling Tea   [] Tea Bags: Any Brand - e.g. Crystal, Yogi, Tazzo, Celestial   [] V8 100% Vegetable Juice   [] Vitamin Water Zero   [] Water   [] Zevia - Stevia Sweetened Soft Drink     BEER/MIKE/LIQUORS  []Naman's Premier  Light 64 Calories   [] Bud Select - 55 Calories   [] [facility] Bloody Rama Mix   [] Temple Genuine Draft - 64 Calories   [] Red or White Wine - All Varieties     CEREALS: HOT/COLD   [] Elena's Puffin's Original Cereal  [] Ganesh's Mill Oat Bran Hot Cereal - Cracked Wheat, Multi-Grain  [] Kashi GoLean Cereal  [] Kashi GoLean Hot Cereal packets - Vanilla; Honey Cinnamon  [] Holland's Special K Protein Cereal  [] Polly's Steel Cut Kourtney Oatmeal  [] Nature's Path Smart Bran  [] Mandaeism Instant Oatmeal packet, Original  [] Mandaeism Old Fashioned Mandaeism Oats  []  Ronni's Whole Wheat & Flaxseed Original Cereal

## 2022-09-08 ENCOUNTER — PATIENT MESSAGE (OUTPATIENT)
Dept: PRIMARY CARE CLINIC | Facility: CLINIC | Age: 42
End: 2022-09-08
Payer: COMMERCIAL

## 2022-10-17 ENCOUNTER — LAB VISIT (OUTPATIENT)
Dept: LAB | Facility: HOSPITAL | Age: 42
End: 2022-10-17
Attending: INTERNAL MEDICINE
Payer: COMMERCIAL

## 2022-10-17 DIAGNOSIS — E88.819 INSULIN RESISTANCE: ICD-10-CM

## 2022-10-17 LAB
ESTIMATED AVG GLUCOSE: 114 MG/DL (ref 68–131)
HBA1C MFR BLD: 5.6 % (ref 4–5.6)

## 2022-10-17 PROCEDURE — 36415 COLL VENOUS BLD VENIPUNCTURE: CPT | Mod: PO | Performed by: INTERNAL MEDICINE

## 2022-10-17 PROCEDURE — 83036 HEMOGLOBIN GLYCOSYLATED A1C: CPT | Performed by: INTERNAL MEDICINE

## 2022-12-21 ENCOUNTER — PATIENT MESSAGE (OUTPATIENT)
Dept: INTERNAL MEDICINE | Facility: CLINIC | Age: 42
End: 2022-12-21
Payer: COMMERCIAL

## 2023-01-18 ENCOUNTER — PATIENT MESSAGE (OUTPATIENT)
Dept: INTERNAL MEDICINE | Facility: CLINIC | Age: 43
End: 2023-01-18
Payer: COMMERCIAL

## 2023-01-18 DIAGNOSIS — G56.03 BILATERAL CARPAL TUNNEL SYNDROME: Primary | ICD-10-CM

## 2023-02-01 ENCOUNTER — CLINICAL SUPPORT (OUTPATIENT)
Dept: REHABILITATION | Facility: HOSPITAL | Age: 43
End: 2023-02-01
Payer: COMMERCIAL

## 2023-02-01 DIAGNOSIS — M79.642 PAIN IN BOTH HANDS: ICD-10-CM

## 2023-02-01 DIAGNOSIS — G56.03 BILATERAL CARPAL TUNNEL SYNDROME: ICD-10-CM

## 2023-02-01 DIAGNOSIS — R29.898 DECREASED GRIP STRENGTH: ICD-10-CM

## 2023-02-01 DIAGNOSIS — M79.641 PAIN IN BOTH HANDS: ICD-10-CM

## 2023-02-01 PROCEDURE — 97165 OT EVAL LOW COMPLEX 30 MIN: CPT | Mod: PN

## 2023-02-01 NOTE — PATIENT INSTRUCTIONS
Issued Via [email or web address] (see logo above) scan QR code for pt specific program          View at [email or web address] using code: [email or web address]

## 2023-02-02 PROBLEM — R29.898 DECREASED GRIP STRENGTH: Status: ACTIVE | Noted: 2023-02-02

## 2023-02-02 PROBLEM — M79.642 PAIN IN BOTH HANDS: Status: ACTIVE | Noted: 2023-02-02

## 2023-02-02 PROBLEM — M79.641 PAIN IN BOTH HANDS: Status: ACTIVE | Noted: 2023-02-02

## 2023-02-03 ENCOUNTER — PATIENT MESSAGE (OUTPATIENT)
Dept: PULMONOLOGY | Facility: CLINIC | Age: 43
End: 2023-02-03
Payer: COMMERCIAL

## 2023-02-03 NOTE — PLAN OF CARE
[facility]  Occupational Therapy Initial Evaluation    Date: 2/1/2023  Name: Juan Pablo Saeed  Clinic Number: [id number]    Therapy Diagnosis:   Encounter Diagnoses   Name Primary?    Bilateral carpal tunnel syndrome     Decreased  strength     Pain in both hands      Physician: Jordin Hampton MD    Physician Orders: Evaluate and treat   Medical Diagnosis: G56.03 (ICD-10-CM) - Bilateral carpal tunnel syndrome   Surgical Procedure and Date: N/A , N/A   Evaluation Date: 2/1/2023  Insurance Authorization Period Expiration: 12/29/2023  Plan of Care Certification Period: 2/2/2023 - 3/30/2023  Progress Note Due: 3/9/2023   Date of Return to MD: none noted in chart   Visit # / Visits authorized: 1 / 1  FOTO: 1/3  Initial=15% / Goal=23%      Precautions:  Standard    Time In: 8:30 am  Time Out: 9:15 am   Total Appointment Time (timed & untimed codes): 45 minutes    SUBJECTIVE     Date of Onset: symptoms began more than two years ago.      History of Current Condition/Mechanism of Injury: Juan Pablo reports: He was treated for carpal tunnel symptoms approximately two years ago at [facility] for a period of 3-4 months.  Juan Pablo reports that his carpal tunnel pain/symptoms have increased for over the past year.  He wears his wrist braces almost 24 hours, only taking off for bathing and self-care.  He is a  and wears them during work hours as well.  He also reports increased night time pain where he has to remove braces and shake hands to relieve the pain.      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     Current Functional Status   Home/Living environment: lives with  "their spouse      Limitation of Functional Status as follows:   ADLs/IADLs:     - Feeding: no difficulty     - Bathing: no difficulty     - Dressing/Grooming: no difficulty     - Driving: minimal difficulty due to hand pain        Pain:  Functional Pain Scale Rating 0-10: Current 6/10, worst 10/10, best 1/10 ; left with greater pain   Location:   Right- ulnar side of hand. Over P1 dorsal, cuticles and under nail   Left- thumb thenar in palm, ulnar sided pain, over P1 and P2  Description: Aching and Numb; N/T thumb/index finger   Aggravating Factors: Night Time and with increased activity, cold weather   Easing Factors: pain medication, rest, and braces; gabapentin    Patient's Goals for Therapy: "To relieve the pain in my hands."     Medical History:   Past Medical History:   Diagnosis Date    Back pain     GERD (gastroesophageal reflux disease)     HTN (hypertension)     Obesity        Surgical History:    has a past surgical history that includes Circumcision (2010).    Medications:   has a current medication list which includes the following prescription(s): acetaminophen, atorvastatin, gabapentin, lisinopril, metformin, multivitamin with minerals, ozempic, and vitamin e.    Allergies:   Review of patient's allergies indicates:  No Known Allergies       OBJECTIVE     Observation/Appearance:  Skin intact     Edema. Measured in centimeters.   2/1/2023 2/1/2023    Left Right   Proximal Wrist Crease 22.4 22.8   Web space 28.0 28.1   MCPs 24.7 24.7       Hand ROM. Measured in degrees.   2/2/2023 2/2/2023      Left Right   Wrist:      Extension  70 63   Flexion  50 60   Radial deviation 20 24   Ulnar deviation  30 25   Supination   75 70   Pronation  90 80        Thumb: MP            Opposition WNL  WNL         Fisting:     Composite  WNL  WNL    Hook  -5.0 -5.3   Flat  WNL  WNL         Sensation  Light touch, temperature, sharp and dull are grossly intact in right and left forearms/wrists/hands  Patient does report " N/T in thumb and index fingers     Special Tests:   Bilateral   2/1/2023   Tinel's Test @ carpal tunnel  Left -MF/IF   Right- none          Strength (Dyanmometer) and Pinch Strength (Pinch Gauge)  Measured in pounds and psi. Average of three trials.   2/1/2023 2/1/2023    Left Right   Rung II 96/65/61 99/87/86   Key Pinch 25/26/25 26/27/26   3pt Pinch 21/22/22 12/14/18   2pt Pinch 15/16/14 12/14/12          Limitation/Restriction for FOTO hand Survey    Therapist reviewed FOTO scores for Juan Pablo Saeed on 2/1/2023.   FOTO documents entered into [employer] - see Media section.    Limitation Score: 15%         Treatment   Total Treatment time (time-based codes) separate from Evaluation: 5 minutes    Juan Pablo received the treatments listed below:       Therapeutic exercises to develop strength, ROM, and flexibility for 5 minutes, including:  -HEP issued/reviewed      Patient Education and Home Exercises      Education provided:   - Initial HEP   - Recommendation of night time wrist brace with pillows inserts to decrease night time pain     Written Home Exercises Provided: yes.  Exercises were reviewed and Juan Pablo was able to demonstrate them prior to the end of the session.  Juan Pablo demonstrated fair  understanding of the education provided. See EMR under Patient Instructions for exercises provided during therapy sessions.     Pt was advised to perform these exercises free of pain, and to stop performing them if pain occurs.    Patient/Family Education: role of OT, goals for OT, scheduling/cancellations - pt verbalized understanding. Discussed insurance limitations with patient.      ASSESSMENT     Juan Pablo Saeed is a 42 y.o. male referred to outpatient occupational therapy and presents with a medical diagnosis of G56.03 (ICD-10-CM) - Bilateral carpal tunnel syndrome .  Patient presents with the following therapy deficits: Decreased ROM, Decreased  strength, Decreased pinch strength, Decreased functional hand  use, and Increased pain and demonstrates limitations as described in the chart below. Following medical record review it is determined that pt will benefit from occupational therapy services in order to maximize pain free and/or functional use of bilateral hands. The following goals were discussed with the patient and patient is in agreement with them as to be addressed in the treatment plan. The patient's rehab potential is Good.     Anticipated barriers to occupational therapy: therapy attendance, compliance with HEP   Pt has no cultural, educational or language barriers to learning provided.    Profile and History Assessment of Occupational Performance Level of Clinical Decision Making Complexity Score   Occupational Profile:   Juan Pablo Saeed is a 42 y.o. male who lives with their spouse and is currently employed Juan Pablo Saeed has difficulty with  ADLs and IADLs as listed previously, which  Affecting hisdaily functional abilities.      Comorbidities:    has a past medical history of Back pain, GERD (gastroesophageal reflux disease), HTN (hypertension), and Obesity.    Medical and Therapy History Review:   Brief               Performance Deficits    Physical:  Muscle Power/Strength  Muscle Endurance   Strength  Pinch Strength  Pain    Cognitive:  No Deficits    Psychosocial:    No Deficits     Clinical Decision Making:  low    Assessment Process:  Problem-Focused Assessments    Modification/Need for Assistance:  Not Necessary    Intervention Selection:  Several Treatment Options       low  Based on PMHX, co morbidities , data from assessments and functional level of assistance required with task and clinical presentation directly impacting function.       The following goals were discussed with the patient and patient is in agreement with them as to be addressed in the treatment plan.     Goals:   Short Term Goals: (in 4 weeks)  1) Patient will be independent in HEP  2) Decrease pain in bilateral  hands to no more than 4-5/10 at worst in ADL/IADL's  3) Increase total AROM in bilateral wrists by 10-15 degrees for improved functioning in ADL/IADL's  4) Increase bilateral  strength by 10% of original measures for increased functional use  5) Perform 9 hole peg test to assess fine motor coordination for increased functional use of bilateral hands in ADL/IADL's    Long Term Goals: (in 8 weeks)  1) Decrease pain in bilateral hands to no more than 2-3/10 at worst in ADL/IADL's  2) Increase AROM of bilateral wrists to WFL for increased functioning in ADL/IADL's  3) Increase bilateral  strength by 15-20% or original measures for improved functioning in ADL/IADL's  4) Increase hook fisting by .2 cm  in bilateral hands for improved functioning in ADL/IADL's  5) Increased functioning in ADL/IADL's, as evidenced by a FOTO impairment rating of no more than 15%        PLAN   Plan of Care Certification: 2/1/2023 to 3/30/2023.     Outpatient Occupational Therapy 2 times weekly for 8 weeks to include the following interventions: Paraffin, Fluidotherapy, Manual therapy/joint mobilizations, Modalities for pain management, Therapeutic exercises/activities., and Strengthening.      Ayan Crockett OT      I CERTIFY THE NEED FOR THESE SERVICES FURNISHED UNDER THIS PLAN OF TREATMENT AND WHILE UNDER MY CARE  Physician's comments:      Physician's Signature: ___________________________________________________

## 2023-02-06 NOTE — PROGRESS NOTES
" [facility]  Occupational Therapy Treatment Note    Date: 2/8/2023  Name: Juan Pabol Saeed  Clinic Number: [id number]    Therapy Diagnosis:   Encounter Diagnoses   Name Primary?    Decreased  strength Yes    Pain in both hands      Physician: Jordin Hampton MD    Physician Orders: Evaluate and treat   Medical Diagnosis: G56.03 (ICD-10-CM) - Bilateral carpal tunnel syndrome   Surgical Procedure and Date: N/A , N/A   Evaluation Date: 2/1/2023  Insurance Authorization Period Expiration: 12/29/2023  Plan of Care Certification Period: 2/2/2023 - 3/30/2023  Progress Note Due: 3/9/2023   Date of Return to MD: none noted in chart   Visit # / Visits authorized: 1 / 20  FOTO: 1/3  Initial=15% / Goal=23%       Precautions:  Standard     Time In: 9: 15 am   Time Out: 10: 15 am   Total Billable Time: 60 minutes (1P, 2MT, 1TE)    SUBJECTIVE     Pt reports: he received his night time wrist braces with pillow supports and feels they are helping.  He has only awoken once with increased pain and need to "shake out" left hand.  He also reports that his numbness has decreased since he has been wearing them.  Juan Pablo reports that he continues to have pain across his knuckles on the back of his hand.  Juan Pablo reports that also participates in roberto frequently. In addition, he has a pair of gloves (arthritic compression gloves) that he wears to mange his hand pain when needed.  He wears wrist braces at all times during work hours.    He was compliant with home exercise program given last session.   Response to previous treatment:first treatment following evaluation   Functional change: first treatment following evaluation     Pain: 6/10 pre-treatment   Location: bilateral hands      OBJECTIVE     Objective Measures updated at progress report unless specified.    Treatment     Juan Pablo received the treatments listed below:     Supervised modalities after being cleared for contradictions:   Paraffin bath - " Paraffin w/ MHP to bilateral hands for 10 min, pre-tx to decrease pain & increase tissue extensibility       Manual therapy techniques: Joint mobilizations, Myofacial release, and Soft tissue Mobilization were applied to the: right and left forearms/hands for 30 minutes, including:  - use of hand techniques, cupping and IASTM tools to increase blood flow/circulation, improve soft tissue pliability and decrease pain.     Therapeutic exercises to develop strength, endurance, ROM, flexibility, and posture for 20 minutes, including:  *Exercises performed bilaterally unless otherwise specified.    Passive stretch    -supination  3 reps X 10 sec hold    -to carpal tunnel  30 sec hold        FA stretches 3 ways  5 X 10 sec hold each   FA stretch/bicep stretch on wall  5 X 10 sec hold        Self carpal tunnel stretches     -butterfly 30 sec    -on table with thumb/thenar eminence extension  30 sec           Patient Education and Home Exercises      Education provided:   - Activity modification to limit repetitive activities requiring gripping, wrist flexed positions as much as possible (outside of work requirements) to decrease symptoms of overuse syndrome  - Initial HEP   - Recommendation of night time wrist brace with pillows inserts to decrease night time pain   - Progress towards goals     Written Home Exercises Provided: Patient instructed to cont prior HEP.  Exercises were reviewed and Juan Pablo was able to demonstrate them prior to the end of the session.  Juan Pablo demonstrated good  understanding of the HEP provided. See EMR under Patient Instructions for exercises provided during therapy sessions.       Assessment   Juan Pablo Saeed is a 42 y.o. male referred to outpatient occupational therapy and presents with a medical diagnosis of G56.03 (ICD-10-CM) - Bilateral carpal tunnel syndrome .  Bilateral forearm musculature bulks with moderate tightness on dorsal and volar surfaces.  L volar forearm with moderate fibrotic  tissue in lateral muscle bulk tissue; there was denser area of tissue present as possible muscle spasm.  Hypothenar eminence L hand was noted to be moderately tight as well.  Patient did report increased comfort following soft tissue mobilization of forearms/hand.  Juan Pablo was able to perform all above listed therapeutic stretches without increased pain or difficulty today.   Pt would continue to benefit from skilled OT for Decreased ROM, Decreased  strength, Decreased pinch strength, Decreased functional hand use, and Increased pain.       Juan Pablo is progressing well towards his goals and there are no updates to goals at this time. Pt prognosis is Good.     Pt will continue to benefit from skilled outpatient occupational therapy to address the deficits listed in the problem list on initial evaluation provide pt/family education and to maximize pt's level of independence in the home and community environment.     Pt's spiritual, cultural and educational needs considered and pt agreeable to plan of care and goals.    Anticipated barriers to occupational therapy: therapy attendance, compliance with HEP       Goals:   Short Term Goals: (in 4 weeks)  1) Patient will be independent in HEP  2) Decrease pain in bilateral hands to no more than 4-5/10 at worst in ADL/IADL's  3) Increase total AROM in bilateral wrists by 10-15 degrees for improved functioning in ADL/IADL's  4) Increase bilateral  strength by 10% of original measures for increased functional use  5) Perform 9 hole peg test to assess fine motor coordination for increased functional use of bilateral hands in ADL/IADL's     Long Term Goals: (in 8 weeks)  1) Decrease pain in bilateral hands to no more than 2-3/10 at worst in ADL/IADL's  2) Increase AROM of bilateral wrists to WFL for increased functioning in ADL/IADL's  3) Increase bilateral  strength by 15-20% or original measures for improved functioning in ADL/IADL's  4) Increase hook fisting by .2 cm   in bilateral hands for improved functioning in ADL/IADL's  5) Increased functioning in ADL/IADL's, as evidenced by a FOTO impairment rating of no more than 15%          PLAN     Plan of Care Certification: 2/1/2023 to 3/30/2023.      Outpatient Occupational Therapy 2 times weekly for 8 weeks to include the following interventions: Paraffin, Fluidotherapy, Manual therapy/joint mobilizations, Modalities for pain management, Therapeutic exercises/activities., and Strengthening.  Updates/Grading for next session: continue soft tissue management, progress stretches as tolerated, continue education on repetitive stress activity modifications      Ayan Crockett, OT

## 2023-02-08 ENCOUNTER — CLINICAL SUPPORT (OUTPATIENT)
Dept: REHABILITATION | Facility: HOSPITAL | Age: 43
End: 2023-02-08
Payer: COMMERCIAL

## 2023-02-08 DIAGNOSIS — M79.641 PAIN IN BOTH HANDS: ICD-10-CM

## 2023-02-08 DIAGNOSIS — M79.642 PAIN IN BOTH HANDS: ICD-10-CM

## 2023-02-08 DIAGNOSIS — R29.898 DECREASED GRIP STRENGTH: Primary | ICD-10-CM

## 2023-02-08 PROCEDURE — 97140 MANUAL THERAPY 1/> REGIONS: CPT | Mod: PN

## 2023-02-08 PROCEDURE — 97018 PARAFFIN BATH THERAPY: CPT | Mod: PN,59

## 2023-02-08 PROCEDURE — 97110 THERAPEUTIC EXERCISES: CPT | Mod: PN

## 2023-02-09 ENCOUNTER — PATIENT OUTREACH (OUTPATIENT)
Dept: ADMINISTRATIVE | Facility: HOSPITAL | Age: 43
End: 2023-02-09
Payer: COMMERCIAL

## 2023-02-09 ENCOUNTER — OFFICE VISIT (OUTPATIENT)
Dept: PULMONOLOGY | Facility: CLINIC | Age: 43
End: 2023-02-09
Payer: COMMERCIAL

## 2023-02-09 VITALS
WEIGHT: 315 LBS | BODY MASS INDEX: 41.75 KG/M2 | RESPIRATION RATE: 16 BRPM | HEART RATE: 81 BPM | HEIGHT: 73 IN | OXYGEN SATURATION: 98 % | DIASTOLIC BLOOD PRESSURE: 80 MMHG | SYSTOLIC BLOOD PRESSURE: 132 MMHG

## 2023-02-09 DIAGNOSIS — G47.26 SHIFTING SLEEP-WORK SCHEDULE: ICD-10-CM

## 2023-02-09 DIAGNOSIS — Z72.820 LACK OF ADEQUATE SLEEP: ICD-10-CM

## 2023-02-09 DIAGNOSIS — G47.33 OSA (OBSTRUCTIVE SLEEP APNEA): Primary | ICD-10-CM

## 2023-02-09 DIAGNOSIS — E66.01 MORBID OBESITY WITH BMI OF 45.0-49.9, ADULT: ICD-10-CM

## 2023-02-09 PROCEDURE — 3008F PR BODY MASS INDEX (BMI) DOCUMENTED: ICD-10-PCS | Mod: CPTII,S$GLB,, | Performed by: NURSE PRACTITIONER

## 2023-02-09 PROCEDURE — 1159F PR MEDICATION LIST DOCUMENTED IN MEDICAL RECORD: ICD-10-PCS | Mod: CPTII,S$GLB,, | Performed by: NURSE PRACTITIONER

## 2023-02-09 PROCEDURE — 99999 PR PBB SHADOW E&M-EST. PATIENT-LVL IV: CPT | Mod: PBBFAC,,, | Performed by: NURSE PRACTITIONER

## 2023-02-09 PROCEDURE — 3079F PR MOST RECENT DIASTOLIC BLOOD PRESSURE 80-89 MM HG: ICD-10-PCS | Mod: CPTII,S$GLB,, | Performed by: NURSE PRACTITIONER

## 2023-02-09 PROCEDURE — 1160F RVW MEDS BY RX/DR IN RCRD: CPT | Mod: CPTII,S$GLB,, | Performed by: NURSE PRACTITIONER

## 2023-02-09 PROCEDURE — 4010F PR ACE/ARB THEARPY RXD/TAKEN: ICD-10-PCS | Mod: CPTII,S$GLB,, | Performed by: NURSE PRACTITIONER

## 2023-02-09 PROCEDURE — 99999 PR PBB SHADOW E&M-EST. PATIENT-LVL IV: ICD-10-PCS | Mod: PBBFAC,,, | Performed by: NURSE PRACTITIONER

## 2023-02-09 PROCEDURE — 99214 OFFICE O/P EST MOD 30 MIN: CPT | Mod: S$GLB,,, | Performed by: NURSE PRACTITIONER

## 2023-02-09 PROCEDURE — 3075F PR MOST RECENT SYSTOLIC BLOOD PRESS GE 130-139MM HG: ICD-10-PCS | Mod: CPTII,S$GLB,, | Performed by: NURSE PRACTITIONER

## 2023-02-09 PROCEDURE — 1160F PR REVIEW ALL MEDS BY PRESCRIBER/CLIN PHARMACIST DOCUMENTED: ICD-10-PCS | Mod: CPTII,S$GLB,, | Performed by: NURSE PRACTITIONER

## 2023-02-09 PROCEDURE — 1159F MED LIST DOCD IN RCRD: CPT | Mod: CPTII,S$GLB,, | Performed by: NURSE PRACTITIONER

## 2023-02-09 PROCEDURE — 99214 PR OFFICE/OUTPT VISIT, EST, LEVL IV, 30-39 MIN: ICD-10-PCS | Mod: S$GLB,,, | Performed by: NURSE PRACTITIONER

## 2023-02-09 PROCEDURE — 4010F ACE/ARB THERAPY RXD/TAKEN: CPT | Mod: CPTII,S$GLB,, | Performed by: NURSE PRACTITIONER

## 2023-02-09 PROCEDURE — 3008F BODY MASS INDEX DOCD: CPT | Mod: CPTII,S$GLB,, | Performed by: NURSE PRACTITIONER

## 2023-02-09 PROCEDURE — 3079F DIAST BP 80-89 MM HG: CPT | Mod: CPTII,S$GLB,, | Performed by: NURSE PRACTITIONER

## 2023-02-09 PROCEDURE — 3075F SYST BP GE 130 - 139MM HG: CPT | Mod: CPTII,S$GLB,, | Performed by: NURSE PRACTITIONER

## 2023-02-09 NOTE — PROGRESS NOTES
"Subjective:      Patient ID: Juan Pablo Saeed is a 42 y.o. male.    Chief Complaint: Sleep Apnea    HPI  Follow up for moderate MOSHE. 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 He only sleeps approx 4 hr with 1 hr nap.  [place] Sleepiness scale score: 7    Patient Active Problem List   Diagnosis    Essential hypertension    MOSHE (obstructive sleep apnea)    Psychophysiological insomnia    Sleep-related bruxism    Idiopathic peripheral neuropathy    Lateral epicondylitis of left elbow    Ganglion cyst of left foot    Proteinuria    Mixed hyperlipidemia    Stage 3a chronic kidney disease    Other hemorrhoids    GERD (gastroesophageal reflux disease)    Morbid obesity with BMI of 50.0-59.9, adult    Insulin resistance    Decreased  strength    Pain in both hands     /80   Pulse 81   Resp 16   Ht 6' 1" (1.854 m)   Wt (!) 168.6 kg (371 lb 11.1 oz)   SpO2 98%   BMI 49.04 kg/m²   Body mass index is 49.04 kg/m².    Review of Systems   Constitutional:  Positive for fatigue.   HENT: Negative.     Respiratory:  Positive for snoring.    Cardiovascular: Negative.    Musculoskeletal: Negative.    Gastrointestinal: Negative.    Neurological: Negative.    Psychiatric/Behavioral:  Positive for sleep disturbance.    Objective:      Physical Exam  Constitutional:       Appearance: He is well-developed. He is obese.   HENT:      Head: Normocephalic and atraumatic.      Mouth/Throat:      Comments: Wearing mask due to COVID-19 protocol  Neck:      Thyroid: No thyroid mass or thyromegaly.      Trachea: Trachea normal.   Cardiovascular:      Rate and Rhythm: Normal rate and regular rhythm.      Heart sounds: Normal heart sounds.   Pulmonary:      Effort: Pulmonary effort is normal.      Breath sounds: Normal breath sounds. No wheezing, rhonchi or rales.   Chest:      Chest wall: There is no dullness to percussion.   Abdominal:      Palpations: Abdomen is " soft. There is no splenomegaly.   Musculoskeletal:         General: Normal range of motion.      Cervical back: Normal range of motion and neck supple.   Skin:     General: Skin is warm and dry.   Neurological:      Mental Status: He is alert and oriented to person, place, and time.   Psychiatric:         Mood and Affect: Mood normal.         Behavior: Behavior normal.     Personal Diagnostic Review  Autopap download: Patient wears on average 3 hrs and 6 minutes. Greater than 4 hrs 12 % of the time. 90% percentile pressure 11 with AHI 1.7 events/hr       Assessment:       1. MOSHE (obstructive sleep apnea)    2. Morbid obesity with BMI of 45.0-49.9, adult    3. Shifting sleep-work schedule    4. Lack of adequate sleep        Outpatient Encounter Medications as of 2/9/2023   Medication Sig Dispense Refill    acetaminophen (TYLENOL) 325 MG tablet Take 325 mg by mouth every 6 (six) hours as needed for Pain.      atorvastatin (LIPITOR) 20 MG tablet Take 1 tablet by mouth once daily 90 tablet 1    gabapentin (NEURONTIN) 300 MG capsule Take 1 capsule (300 mg total) by mouth 2 (two) times daily. 60 capsule 3    lisinopriL (PRINIVIL,ZESTRIL) 40 MG tablet Take 1 tablet (40 mg total) by mouth once daily. 90 tablet 3    multivitamin with minerals tablet Take 1 tablet by mouth.      semaglutide (OZEMPIC) 0.25 mg or 0.5 mg(2 mg/1.5 mL) pen injector Inject 0.25 mg into the skin every 7 days. 1 pen 5    vitamin E 8.9 unit/g Crea Apply to affected area twice daily 112 g 2    [DISCONTINUED] metFORMIN (GLUCOPHAGE) 500 MG tablet Take 1 tablet (500 mg total) by mouth 2 (two) times daily with meals. 180 tablet 3     No facility-administered encounter medications on file as of 2/9/2023.     Orders Placed This Encounter   Procedures    CPAP/BIPAP SUPPLIES     Order Specific Question:   Length of need (1-99 months):     Answer:   99     Order Specific Question:   Choose ONE mask type and its corresponding cushions and/or pillows:     Answer:     Full Face Mask, 1 per 90 days:  Full Face Cushion, (3 per 90 days)     Order Specific Question:   Choose EITHER Heated or Non-Heated Tubjing     Answer:    Non-Heated Tubing, 1 per 90 days     Order Specific Question:   All other supplies as needed as listed below:     Answer:    Headgear, 1 per 180 days     Order Specific Question:   All other supplies as needed as listed below:     Answer:    Disposable Filter, 6 per 90 days     Order Specific Question:   All other supplies as needed as listed below:     Answer:    Non-Disposable Filter, 1 per 180 days     Order Specific Question:   All other supplies as needed as listed below:     Answer:    Humidifier Chamber, 1 per 180 days     Plan:        Problem List Items Addressed This Visit          Other    MOSHE (obstructive sleep apnea) - Primary    Overview     AHI 23/hr         Relevant Orders    CPAP/BIPAP SUPPLIES     Other Visit Diagnoses       Morbid obesity with BMI of 45.0-49.9, adult        Shifting sleep-work schedule        Lack of adequate sleep            Weight loss and exercise to improve overall health. Benefits from use  Wear PAP daily over 4 hours.   Try to obtain more sleep time.  Follow up 6 months

## 2023-02-09 NOTE — PROGRESS NOTES
Health Maintenance Due   Topic Date Due    TETANUS VACCINE  Never done    COVID-19 Vaccine (4 - Booster for Moderna series) 02/18/2022

## 2023-02-13 ENCOUNTER — CLINICAL SUPPORT (OUTPATIENT)
Dept: REHABILITATION | Facility: HOSPITAL | Age: 43
End: 2023-02-13
Payer: COMMERCIAL

## 2023-02-13 DIAGNOSIS — M79.642 PAIN IN BOTH HANDS: ICD-10-CM

## 2023-02-13 DIAGNOSIS — R29.898 DECREASED GRIP STRENGTH: Primary | ICD-10-CM

## 2023-02-13 DIAGNOSIS — M79.641 PAIN IN BOTH HANDS: ICD-10-CM

## 2023-02-13 PROCEDURE — 97018 PARAFFIN BATH THERAPY: CPT | Mod: PN

## 2023-02-13 PROCEDURE — 97110 THERAPEUTIC EXERCISES: CPT | Mod: PN

## 2023-02-13 PROCEDURE — 97140 MANUAL THERAPY 1/> REGIONS: CPT | Mod: PN

## 2023-02-13 NOTE — PROGRESS NOTES
[facility]  Occupational Therapy Treatment Note    Date: 2/13/2023  Name: Juan Pablo Saeed  Clinic Number: [id number]    Therapy Diagnosis:   Encounter Diagnoses   Name Primary?    Decreased  strength Yes    Pain in both hands      Physician: Jordin Hampton MD    Physician Orders: Evaluate and treat   Medical Diagnosis: G56.03 (ICD-10-CM) - Bilateral carpal tunnel syndrome   Surgical Procedure and Date: N/A , N/A   Evaluation Date: 2/1/2023  Insurance Authorization Period Expiration: 12/29/2023  Plan of Care Certification Period: 2/2/2023 - 3/30/2023  Progress Note Due: 3/9/2023   Date of Return to MD: none noted in chart   Visit # / Visits authorized: 2 / 20  FOTO: 1/3  Initial=15% / Goal=23%       Precautions:  Standard     Time In: 9:15 am   Time Out: 10:00 am   Total Billable Time: 45 minutes (1P, 1MT, 1TE)    SUBJECTIVE     Pt reports: he likes the night time wrist braces and feels they continue to help decrease night time pain.  Juan Pablo reports that he continues to have pain across his knuckles on the back of his hand.    He was compliant with home exercise program given last session. Performing stretches 2-3 times per day.    Response to previous treatment: decreased night time pain and numbness   Functional change: none reported     Pain: 6/10 pre-treatment   Location: bilateral hands      OBJECTIVE     Objective Measures updated at progress report unless specified.    Treatment     Juan Pablo received the treatments listed below:     Supervised modalities after being cleared for contradictions:   Paraffin bath - Paraffin w/ MHP to bilateral hands for 10 min, pre-tx to decrease pain & increase tissue extensibility       Manual therapy techniques: Joint mobilizations, Myofacial release, and Soft tissue Mobilization were applied to the: right and left forearms/hands for 23 minutes, including:  - use of hand techniques, cupping and IASTM tools to increase blood flow/circulation,  improve soft tissue pliability and decrease pain.     Therapeutic exercises to develop strength, endurance, ROM, flexibility, and posture for 12 minutes, including:  *Exercises performed bilaterally unless otherwise specified.    Passive stretch    -supination  3 reps X 10 sec hold    -to carpal tunnel  30 sec hold    -intrinsics  2 reps X 15 sec hold    FA stretches 3 ways  5 X 10 sec hold each   FA stretch/bicep stretch on wall  5 X 10 sec hold        Self carpal tunnel stretches     -butterfly 30 sec    -on table with thumb/thenar eminence extension  30 sec           Patient Education and Home Exercises      Education provided:   - Activity modification to limit repetitive activities requiring gripping, wrist flexed positions as much as possible (outside of work requirements) to decrease symptoms of overuse syndrome  - Initial HEP   - Recommendation of night time wrist brace with pillows inserts to decrease night time pain   - Progress towards goals     Written Home Exercises Provided: Patient instructed to cont prior HEP.  Exercises were reviewed and Juan Pablo was able to demonstrate them prior to the end of the session.  Juan Pablo demonstrated good  understanding of the HEP provided. See EMR under Patient Instructions for exercises provided during therapy sessions.       Assessment   Juan Pablo Saeed is a 42 y.o. male referred to outpatient occupational therapy and presents with a medical diagnosis of G56.03 (ICD-10-CM) - Bilateral carpal tunnel syndrome.  Bilateral forearm musculature bulks with moderate tightness on dorsal and volar surfaces.  L volar forearm continues with moderate fibrotic tissue in lateral muscle bulk; and with denser area of tissue present as possible muscle spasm.  Hypothenar eminence L hand was noted to be moderately tight as well.  Patient did report increased comfort following soft tissue mobilization of forearms/hand.  Juan Pablo was able to perform all above listed therapeutic stretches  without increased pain or difficulty today.   Pt would continue to benefit from skilled OT for Decreased ROM, Decreased  strength, Decreased pinch strength, Decreased functional hand use, and Increased pain.       Juan Pablo is progressing well towards his goals and there are no updates to goals at this time. Pt prognosis is Good.     Pt will continue to benefit from skilled outpatient occupational therapy to address the deficits listed in the problem list on initial evaluation provide pt/family education and to maximize pt's level of independence in the home and community environment.     Pt's spiritual, cultural and educational needs considered and pt agreeable to plan of care and goals.    Anticipated barriers to occupational therapy: therapy attendance, compliance with HEP       Goals:   Short Term Goals: (in 4 weeks)  1) Patient will be independent in HEP -Progressing   2) Decrease pain in bilateral hands to no more than 4-5/10 at worst in ADL/IADL's -Progressing   3) Increase total AROM in bilateral wrists by 10-15 degrees for improved functioning in ADL/IADL's -Progressing   4) Increase bilateral  strength by 10% of original measures for increased functional use-Progressing   5) Perform 9 hole peg test to assess fine motor coordination for increased functional use of bilateral hands in ADL/IADL's -Progressing      Long Term Goals: (in 8 weeks)  1) Decrease pain in bilateral hands to no more than 2-3/10 at worst in ADL/IADL's  2) Increase AROM of bilateral wrists to WFL for increased functioning in ADL/IADL's  3) Increase bilateral  strength by 15-20% or original measures for improved functioning in ADL/IADL's  4) Increase hook fisting by .2 cm  in bilateral hands for improved functioning in ADL/IADL's  5) Increased functioning in ADL/IADL's, as evidenced by a FOTO impairment rating of no more than 15%          PLAN     Plan of Care Certification: 2/1/2023 to 3/30/2023.      Outpatient Occupational  Therapy 2 times weekly for 8 weeks to include the following interventions: Paraffin, Fluidotherapy, Manual therapy/joint mobilizations, Modalities for pain management, Therapeutic exercises/activities., and Strengthening.  Updates/Grading for next session: continue soft tissue management, progress stretches as tolerated, continue education on repetitive stress activity modifications      Ayan Crockett, OT

## 2023-02-20 ENCOUNTER — CLINICAL SUPPORT (OUTPATIENT)
Dept: REHABILITATION | Facility: HOSPITAL | Age: 43
End: 2023-02-20
Payer: COMMERCIAL

## 2023-02-20 DIAGNOSIS — M79.641 PAIN IN BOTH HANDS: ICD-10-CM

## 2023-02-20 DIAGNOSIS — M79.642 PAIN IN BOTH HANDS: ICD-10-CM

## 2023-02-20 DIAGNOSIS — R29.898 DECREASED GRIP STRENGTH: Primary | ICD-10-CM

## 2023-02-20 PROCEDURE — 97110 THERAPEUTIC EXERCISES: CPT | Mod: PN

## 2023-02-20 PROCEDURE — 97140 MANUAL THERAPY 1/> REGIONS: CPT | Mod: PN

## 2023-02-20 PROCEDURE — 97018 PARAFFIN BATH THERAPY: CPT | Mod: PN,59

## 2023-02-20 NOTE — PROGRESS NOTES
[facility]  Occupational Therapy Treatment Note    Date: 2/20/2023  Name: Juan Pablo Saeed  Clinic Number: [id number]    Therapy Diagnosis:   Encounter Diagnoses   Name Primary?    Decreased  strength Yes    Pain in both hands      Physician: Jordin Hampton MD    Physician Orders: Evaluate and treat   Medical Diagnosis: G56.03 (ICD-10-CM) - Bilateral carpal tunnel syndrome   Surgical Procedure and Date: N/A , N/A   Evaluation Date: 2/1/2023  Insurance Authorization Period Expiration: 12/29/2023  Plan of Care Certification Period: 2/2/2023 - 3/30/2023  Progress Note Due: 3/9/2023   Date of Return to MD: none noted in chart   Visit # / Visits authorized: 3 / 20  FOTO: 1/3  Initial=15% / Goal=23%       Precautions:  Standard     Time In: 9:00 am   Time Out: 10:00 am   Total Billable Time: 60 minutes (1P, 2MT, 1TE)    SUBJECTIVE     Pt reports: night time wrist braces continue to help decrease night time pain.  He reported increased pain in palm of left hand and increased numbness thumb, index, and middle finger since last week.  He also continues to report bilateral ulnar sided hand pain, worse on left. He has ordered new wrist braces for use at work.   He was compliant with home exercise program given last session. Performing stretches 2-3 times per day.    Response to previous treatment: decreased night time pain   Functional change: none reported     Pain: 3/10 left, 1/10 right pre-treatment (6/10 previous session); 1-2/10 left post treatment   Location: bilateral hands      OBJECTIVE     Objective Measures updated at progress report unless specified.    Treatment     Juan Pablo received the treatments listed below:     Supervised modalities after being cleared for contradictions:   Paraffin bath - Paraffin w/ MHP to bilateral hands for 8 min, pre-tx to decrease pain & increase tissue extensibility       Manual therapy techniques: Joint mobilizations, Myofacial release, and Soft  tissue Mobilization were applied to the: right and left forearms/hands for 35 minutes, including:  - use of hand techniques, cupping and IASTM tools to increase blood flow/circulation, improve soft tissue pliability and decrease pain.   -Kinesiotaping: jelly fish pattern applied to area of denser soft tissue in left volar forearm to increase circulation and decrease pain. Patient instructed on purpose, wear, care, precautions to monitor and removal of KT. Patient verbalized understanding of all instructions provided.      Therapeutic exercises to develop strength, endurance, ROM, flexibility, and posture for 17 minutes, including:  *Exercises performed bilaterally unless otherwise specified.    Passive stretch    -supination  3 reps X 10 sec hold    -to carpal tunnel  30 sec hold    -intrinsics  2 reps X 15 sec hold    FA stretches 3 ways  5 X 10 sec hold each   FA stretch/bicep stretch on wall  5 X 10 sec hold        Self carpal tunnel stretches     -butterfly 30 sec    -on table with thumb/thenar eminence extension  30 sec        Nerve glides:    -median nerve glides for hand  3 reps each hand    -ulnar nerve glides (@ Guyons canal)  3 reps each hand               Patient Education and Home Exercises      Education provided:   - Patient instructed on purpose, wear, care, precautions to monitor and removal of KT. Patient verbalized understanding of all instructions provided.    - Median and ulnar nerve glides for hand reviewed; handout provided 2/20/23  - Activity modification to limit repetitive activities requiring gripping, wrist flexed positions as much as possible (outside of work requirements) to decrease symptoms of overuse syndrome  - Initial HEP   - Recommendation of night time wrist brace with pillows inserts to decrease night time pain   - Progress towards goals     Written Home Exercises Provided: Patient instructed to cont prior HEP.  Exercises were reviewed and Juan Pablo was able to demonstrate them  prior to the end of the session.  Juan Pablo demonstrated good  understanding of the HEP provided. See EMR under Patient Instructions for exercises provided during therapy sessions.       Assessment   Juan Pablo Saeed is a 42 y.o. male referred to outpatient occupational therapy and presents with a medical diagnosis of G56.03 (ICD-10-CM) - Bilateral carpal tunnel syndrome.  Tightness of bilateral forearm dorsal and volar musculature bulks improving with each session.  Denser area of tissue present as possible muscle spasm in L volar forearm lateral muscle bulk persists.  Patient does report increased comfort following paraffin and soft tissue mobilization of forearms/hand.  Initiated median and ulnar nerve glides for hand.  Juan Pablo was able to perform all above listed therapeutic stretches without increased pain or difficulty today.  Pt would continue to benefit from skilled OT for Decreased ROM, Decreased  strength, Decreased pinch strength, Decreased functional hand use, and Increased pain.       Juan Pablo is progressing well towards his goals and there are no updates to goals at this time. Pt prognosis is Good.     Pt will continue to benefit from skilled outpatient occupational therapy to address the deficits listed in the problem list on initial evaluation provide pt/family education and to maximize pt's level of independence in the home and community environment.     Pt's spiritual, cultural and educational needs considered and pt agreeable to plan of care and goals.    Anticipated barriers to occupational therapy: therapy attendance, compliance with HEP       Goals:   Short Term Goals: (in 4 weeks)  1) Patient will be independent in HEP -Progressing   2) Decrease pain in bilateral hands to no more than 4-5/10 at worst in ADL/IADL's -Progressing   3) Increase total AROM in bilateral wrists by 10-15 degrees for improved functioning in ADL/IADL's -Progressing   4) Increase bilateral  strength by 10% of  original measures for increased functional use-Progressing   5) Perform 9 hole peg test to assess fine motor coordination for increased functional use of bilateral hands in ADL/IADL's -Progressing      Long Term Goals: (in 8 weeks)  1) Decrease pain in bilateral hands to no more than 2-3/10 at worst in ADL/IADL's  2) Increase AROM of bilateral wrists to WFL for increased functioning in ADL/IADL's  3) Increase bilateral  strength by 15-20% or original measures for improved functioning in ADL/IADL's  4) Increase hook fisting by .2 cm  in bilateral hands for improved functioning in ADL/IADL's  5) Increased functioning in ADL/IADL's, as evidenced by a FOTO impairment rating of no more than 15%          PLAN     Plan of Care Certification: 2/1/2023 to 3/30/2023.      Outpatient Occupational Therapy 2 times weekly for 8 weeks to include the following interventions: Paraffin, Fluidotherapy, Manual therapy/joint mobilizations, Modalities for pain management, Therapeutic exercises/activities., and Strengthening.  Updates/Grading for next session: continue soft tissue management, progress stretches as tolerated, continue education on repetitive stress activity modifications      Ayan Crockett, OT

## 2023-02-20 NOTE — PATIENT INSTRUCTIONS
Issued Via [email or web address] (see logo above) scan QR code for pt specific program          View at [email or web address] using code: [email or web address]

## 2023-03-06 ENCOUNTER — CLINICAL SUPPORT (OUTPATIENT)
Dept: REHABILITATION | Facility: HOSPITAL | Age: 43
End: 2023-03-06
Payer: COMMERCIAL

## 2023-03-06 DIAGNOSIS — R29.898 DECREASED GRIP STRENGTH: Primary | ICD-10-CM

## 2023-03-06 DIAGNOSIS — M79.642 PAIN IN BOTH HANDS: ICD-10-CM

## 2023-03-06 DIAGNOSIS — M79.641 PAIN IN BOTH HANDS: ICD-10-CM

## 2023-03-06 PROCEDURE — 97018 PARAFFIN BATH THERAPY: CPT | Mod: PN

## 2023-03-06 PROCEDURE — 97110 THERAPEUTIC EXERCISES: CPT | Mod: PN

## 2023-03-06 PROCEDURE — 97140 MANUAL THERAPY 1/> REGIONS: CPT | Mod: PN

## 2023-03-06 NOTE — PROGRESS NOTES
"  [facility]  Occupational Therapy Treatment Note    Date: 3/6/2023  Name: Juan Pablo Saeed  Clinic Number: [id number]    Therapy Diagnosis:   Encounter Diagnoses   Name Primary?    Decreased  strength Yes    Pain in both hands        Physician: Jordin Hampton MD    Physician Orders: Evaluate and treat   Medical Diagnosis: G56.03 (ICD-10-CM) - Bilateral carpal tunnel syndrome   Surgical Procedure and Date: N/A , N/A   Evaluation Date: 2/1/2023  Insurance Authorization Period Expiration: 12/29/2023  Plan of Care Certification Period: 2/2/2023 - 3/30/2023  Progress Note Due: 3/9/2023   Date of Return to MD: none noted in chart   Visit # / Visits authorized: 4 / 20   FOTO: 1/3  Initial=15% / Goal=23%       Precautions:  Standard     Time In: 1:35 pm   Time Out: 2:30 pm   Total Billable Time: 55 minutes (1P, 2MT, 1TE)    SUBJECTIVE     Pt reports: "I had a good week and did not think about the pain."  He has ordered and received new wrist braces that are a tighter fit.  He wears with work activities as much as possible.    He was compliant with home exercise program given last session. Performing stretches 2-3 times per day.    Response to previous treatment: decreased hand pain since last visit   Functional change: none reported     Pain: 1/10 left, 1/10 right pre-treatment; 1/10 left post treatment   Location: bilateral hands      OBJECTIVE     Objective Measures updated at progress report unless specified.    Treatment     Juan Pablo received the treatments listed below:     Supervised modalities after being cleared for contradictions:   Paraffin bath - Paraffin w/ MHP to bilateral hands for 10 min, pre-tx to decrease pain & increase tissue extensibility       Manual therapy techniques: Joint mobilizations, Myofacial release, and Soft tissue Mobilization were applied to the: right and left forearms/hands for 30 minutes, including:  - use of hand techniques, cupping and IASTM tools to " increase blood flow/circulation, improve soft tissue pliability and decrease pain.   -  Therapeutic exercises to develop strength, endurance, ROM, flexibility, and posture for 15 minutes, including:  *Exercises performed bilaterally unless otherwise specified.    Passive stretch    -supination  3 reps X 10 sec hold    -to carpal tunnel  30 sec hold    -intrinsics  2 reps X 15 sec hold        FA stretches 3 ways  5 X 10 sec hold each   FA stretch/bicep stretch on wall  5 X 10 sec hold    Bicep stretch on table  5 X 10 sec hold       Self carpal tunnel stretches     -butterfly 30 sec    -on table with thumb/thenar eminence extension  30 sec        Nerve glides:    -median nerve glides for hand  3 reps each hand    -ulnar nerve glides (@ Guyons canal)  3 reps each hand               Patient Education and Home Exercises      Education provided:   - Patient instructed on purpose, wear, care, precautions to monitor and removal of KT. Patient verbalized understanding of all instructions provided.    - Median and ulnar nerve glides for hand reviewed; handout provided 2/20/23  - Activity modification to limit repetitive activities requiring gripping, wrist flexed positions as much as possible (outside of work requirements) to decrease symptoms of overuse syndrome  - Initial HEP   - Recommendation of night time wrist brace with pillows inserts to decrease night time pain   - Progress towards goals     Written Home Exercises Provided: Patient instructed to cont prior HEP.  Exercises were reviewed and Juan Pablo was able to demonstrate them prior to the end of the session.  Juan Pablo demonstrated good  understanding of the HEP provided. See EMR under Patient Instructions for exercises provided during therapy sessions.       Assessment   Juan Pablo Saeed is a 42 y.o. male referred to outpatient occupational therapy and presents with a medical diagnosis of G56.03 (ICD-10-CM) - Bilateral carpal tunnel syndrome.  Tightness of bilateral  forearm dorsal and volar musculature continues to improve with each session.  Denser area of tissue present as possible muscle spasm in L volar forearm lateral muscle bulk persists, but improved following soft tissue mobilization.  Juan Pablo is reporting overall decreased pain levels in bilateral hands today.  Pt would continue to benefit from skilled OT for Decreased ROM, Decreased  strength, Decreased pinch strength, Decreased functional hand use, and Increased pain.       Juan Pablo is progressing well towards his goals and there are no updates to goals at this time. Pt prognosis is Good.     Pt will continue to benefit from skilled outpatient occupational therapy to address the deficits listed in the problem list on initial evaluation provide pt/family education and to maximize pt's level of independence in the home and community environment.     Pt's spiritual, cultural and educational needs considered and pt agreeable to plan of care and goals.    Anticipated barriers to occupational therapy: therapy attendance, compliance with HEP       Goals:   Short Term Goals: (in 4 weeks)  1) Patient will be independent in HEP -Met 3/6/2023  2) Decrease pain in bilateral hands to no more than 4-5/10 at worst in ADL/IADL's -Progressing   3) Increase total AROM in bilateral wrists by 10-15 degrees for improved functioning in ADL/IADL's -Progressing   4) Increase bilateral  strength by 10% of original measures for increased functional use-Progressing   5) Perform 9 hole peg test to assess fine motor coordination for increased functional use of bilateral hands in ADL/IADL's -Progressing      Long Term Goals: (in 8 weeks)  1) Decrease pain in bilateral hands to no more than 2-3/10 at worst in ADL/IADL's  2) Increase AROM of bilateral wrists to WFL for increased functioning in ADL/IADL's  3) Increase bilateral  strength by 15-20% or original measures for improved functioning in ADL/IADL's  4) Increase hook fisting by .2 cm   in bilateral hands for improved functioning in ADL/IADL's  5) Increased functioning in ADL/IADL's, as evidenced by a FOTO impairment rating of no more than 15%          PLAN     Plan of Care Certification: 2/1/2023 to 3/30/2023.      Outpatient Occupational Therapy 2 times weekly for 8 weeks to include the following interventions: Paraffin, Fluidotherapy, Manual therapy/joint mobilizations, Modalities for pain management, Therapeutic exercises/activities., and Strengthening.  Updates/Grading for next session: continue soft tissue management, progress stretches as tolerated, continue education on repetitive stress activity modifications      Ayan Crockett, OT

## 2023-03-29 ENCOUNTER — PATIENT OUTREACH (OUTPATIENT)
Dept: ADMINISTRATIVE | Facility: HOSPITAL | Age: 43
End: 2023-03-29
Payer: COMMERCIAL

## 2023-03-29 NOTE — PROGRESS NOTES
.Working HTN report:     Called to discuss scheduling appointment and to get updated BP reading. No answer, LVM

## 2023-03-29 NOTE — PROGRESS NOTES
Working HTN report:     Called to discuss scheduling appointment and to get updated BP reading. No answer, LVM

## 2023-04-26 ENCOUNTER — PATIENT MESSAGE (OUTPATIENT)
Dept: INTERNAL MEDICINE | Facility: CLINIC | Age: 43
End: 2023-04-26
Payer: COMMERCIAL

## 2023-04-26 DIAGNOSIS — N18.31 STAGE 3A CHRONIC KIDNEY DISEASE: ICD-10-CM

## 2023-05-02 ENCOUNTER — DOCUMENTATION ONLY (OUTPATIENT)
Dept: REHABILITATION | Facility: HOSPITAL | Age: 43
End: 2023-05-02
Payer: COMMERCIAL

## 2023-05-02 NOTE — PROGRESS NOTES
[facility]   Discharge Note    Name: Juan Pablo Saeed  Clinic Number: [id number]    Therapy Diagnosis: No diagnosis found.  Physician: No ref. provider found    Physician Orders: Evaluate and treat   Medical Diagnosis: G56.03 (ICD-10-CM) - Bilateral carpal tunnel syndrome   Evaluation Date:2/1/2023       Date of Last visit: 3/6/2023   Total Visits Received: 5    ASSESSMENT      Tightness of bilateral forearm dorsal and volar musculature continues to improve with each session.  Denser area of tissue present as possible muscle spasm in L volar forearm lateral muscle bulk persists, but improved following soft tissue mobilization.  Juan Pablo is reporting overall decreased pain levels in bilateral hands today.  Pt would continue to benefit from skilled OT for Decreased ROM, Decreased  strength, Decreased pinch strength, Decreased functional hand use, and Increased pain.      Discharge reason: Patient has not attended therapy since 3/6/2023     Discharge FOTO Score: not taken; patient did not return to therapy     Goals:   Short Term Goals: (in 4 weeks)  1) Patient will be independent in HEP -Met 3/6/2023  2) Decrease pain in bilateral hands to no more than 4-5/10 at worst in ADL/IADL's -Progressing   3) Increase total AROM in bilateral wrists by 10-15 degrees for improved functioning in ADL/IADL's -Progressing   4) Increase bilateral  strength by 10% of original measures for increased functional use-Progressing   5) Perform 9 hole peg test to assess fine motor coordination for increased functional use of bilateral hands in ADL/IADL's -Progressing      Long Term Goals: (in 8 weeks)  1) Decrease pain in bilateral hands to no more than 2-3/10 at worst in ADL/IADL's  2) Increase AROM of bilateral wrists to WFL for increased functioning in ADL/IADL's  3) Increase bilateral  strength by 15-20% or original measures for improved functioning in ADL/IADL's  4) Increase hook fisting by .2 cm  in  bilateral hands for improved functioning in ADL/IADL's  5) Increased functioning in ADL/IADL's, as evidenced by a FOTO impairment rating of no more than 15%    PLAN   This patient is discharged from Occupational Therapy      Ayan Crockett, OT

## 2023-05-15 ENCOUNTER — OFFICE VISIT (OUTPATIENT)
Dept: INTERNAL MEDICINE | Facility: CLINIC | Age: 43
End: 2023-05-15
Payer: COMMERCIAL

## 2023-05-15 VITALS
BODY MASS INDEX: 48.86 KG/M2 | DIASTOLIC BLOOD PRESSURE: 88 MMHG | HEART RATE: 80 BPM | OXYGEN SATURATION: 98 % | TEMPERATURE: 97 F | WEIGHT: 315 LBS | SYSTOLIC BLOOD PRESSURE: 130 MMHG

## 2023-05-15 DIAGNOSIS — L29.3 ITCHING OF PENIS: Primary | ICD-10-CM

## 2023-05-15 DIAGNOSIS — R53.83 FATIGUE, UNSPECIFIED TYPE: ICD-10-CM

## 2023-05-15 DIAGNOSIS — N18.31 STAGE 3A CHRONIC KIDNEY DISEASE: ICD-10-CM

## 2023-05-15 DIAGNOSIS — I10 ESSENTIAL HYPERTENSION: ICD-10-CM

## 2023-05-15 DIAGNOSIS — E88.819 INSULIN RESISTANCE: ICD-10-CM

## 2023-05-15 DIAGNOSIS — G47.33 OSA (OBSTRUCTIVE SLEEP APNEA): ICD-10-CM

## 2023-05-15 LAB
BILIRUB SERPL-MCNC: NEGATIVE MG/DL
BLOOD URINE, POC: NEGATIVE
CLARITY, POC UA: CLEAR
COLOR, POC UA: YELLOW
GLUCOSE UR QL STRIP: NORMAL
KETONES UR QL STRIP: NEGATIVE
LEUKOCYTE ESTERASE URINE, POC: NEGATIVE
NITRITE, POC UA: NEGATIVE
PH, POC UA: 5
PROTEIN, POC: NORMAL
SPECIFIC GRAVITY, POC UA: 1.02
UROBILINOGEN, POC UA: NORMAL

## 2023-05-15 PROCEDURE — 1159F PR MEDICATION LIST DOCUMENTED IN MEDICAL RECORD: ICD-10-PCS | Mod: CPTII,S$GLB,, | Performed by: NURSE PRACTITIONER

## 2023-05-15 PROCEDURE — 81002 POCT URINE DIPSTICK WITHOUT MICROSCOPE: ICD-10-PCS | Mod: S$GLB,,, | Performed by: NURSE PRACTITIONER

## 2023-05-15 PROCEDURE — 3008F BODY MASS INDEX DOCD: CPT | Mod: CPTII,S$GLB,, | Performed by: NURSE PRACTITIONER

## 2023-05-15 PROCEDURE — 4010F ACE/ARB THERAPY RXD/TAKEN: CPT | Mod: CPTII,S$GLB,, | Performed by: NURSE PRACTITIONER

## 2023-05-15 PROCEDURE — 1159F MED LIST DOCD IN RCRD: CPT | Mod: CPTII,S$GLB,, | Performed by: NURSE PRACTITIONER

## 2023-05-15 PROCEDURE — 3079F DIAST BP 80-89 MM HG: CPT | Mod: CPTII,S$GLB,, | Performed by: NURSE PRACTITIONER

## 2023-05-15 PROCEDURE — 3008F PR BODY MASS INDEX (BMI) DOCUMENTED: ICD-10-PCS | Mod: CPTII,S$GLB,, | Performed by: NURSE PRACTITIONER

## 2023-05-15 PROCEDURE — 99999 PR PBB SHADOW E&M-EST. PATIENT-LVL IV: ICD-10-PCS | Mod: PBBFAC,,, | Performed by: NURSE PRACTITIONER

## 2023-05-15 PROCEDURE — 3079F PR MOST RECENT DIASTOLIC BLOOD PRESSURE 80-89 MM HG: ICD-10-PCS | Mod: CPTII,S$GLB,, | Performed by: NURSE PRACTITIONER

## 2023-05-15 PROCEDURE — 1160F PR REVIEW ALL MEDS BY PRESCRIBER/CLIN PHARMACIST DOCUMENTED: ICD-10-PCS | Mod: CPTII,S$GLB,, | Performed by: NURSE PRACTITIONER

## 2023-05-15 PROCEDURE — 99999 PR PBB SHADOW E&M-EST. PATIENT-LVL IV: CPT | Mod: PBBFAC,,, | Performed by: NURSE PRACTITIONER

## 2023-05-15 PROCEDURE — 3075F SYST BP GE 130 - 139MM HG: CPT | Mod: CPTII,S$GLB,, | Performed by: NURSE PRACTITIONER

## 2023-05-15 PROCEDURE — 81002 URINALYSIS NONAUTO W/O SCOPE: CPT | Mod: S$GLB,,, | Performed by: NURSE PRACTITIONER

## 2023-05-15 PROCEDURE — 1160F RVW MEDS BY RX/DR IN RCRD: CPT | Mod: CPTII,S$GLB,, | Performed by: NURSE PRACTITIONER

## 2023-05-15 PROCEDURE — 99214 OFFICE O/P EST MOD 30 MIN: CPT | Mod: S$GLB,,, | Performed by: NURSE PRACTITIONER

## 2023-05-15 PROCEDURE — 87591 N.GONORRHOEAE DNA AMP PROB: CPT | Performed by: NURSE PRACTITIONER

## 2023-05-15 PROCEDURE — 3075F PR MOST RECENT SYSTOLIC BLOOD PRESS GE 130-139MM HG: ICD-10-PCS | Mod: CPTII,S$GLB,, | Performed by: NURSE PRACTITIONER

## 2023-05-15 PROCEDURE — 4010F PR ACE/ARB THEARPY RXD/TAKEN: ICD-10-PCS | Mod: CPTII,S$GLB,, | Performed by: NURSE PRACTITIONER

## 2023-05-15 PROCEDURE — 99214 PR OFFICE/OUTPT VISIT, EST, LEVL IV, 30-39 MIN: ICD-10-PCS | Mod: S$GLB,,, | Performed by: NURSE PRACTITIONER

## 2023-05-15 RX ORDER — FLUCONAZOLE 150 MG/1
150 TABLET ORAL DAILY
Qty: 1 TABLET | Refills: 0 | Status: SHIPPED | OUTPATIENT
Start: 2023-05-15 | End: 2023-05-16

## 2023-05-15 NOTE — PROGRESS NOTES
Subjective:       Patient ID: Juan Pablo Saeed is a 42 y.o. male.    Chief Complaint: Penis Pain    Pt presents to clinic today for itching penis   Itching, sore tip of penis started last week  Found a hair in the tip of his penis, removed it and has felt uncomfortable since then  Denies any dysuria, pelvic pain or pressure   No penis pain or discharge  No testicular pain or swelling    Pt also complaining of fatigue  Feels like he is more tired than normal  Wears his CPAP most nights but still does not feel rested    He is also having some worsening of his neuropathy  Taking gapapentin as prescribed but doesn't feel like it helps as much  Uses braces on his hands, otc creams for feet    Due for annual with Dr. Hampton and updated labs  Unable to afford the ozmepic       /88   Pulse 80   Temp 96.8 °F (36 °C)   Wt (!) 168 kg (370 lb 6 oz)   SpO2 98%   BMI 48.86 kg/m²     Review of Systems   Constitutional:  Negative for activity change, appetite change, chills, diaphoresis, fatigue, fever and unexpected weight change.   HENT: Negative.     Eyes: Negative.    Respiratory:  Negative for apnea, chest tightness, shortness of breath and stridor.    Cardiovascular:  Negative for chest pain, palpitations and leg swelling.   Gastrointestinal: Negative.    Endocrine: Negative.    Genitourinary:  Positive for urgency. Negative for decreased urine volume, difficulty urinating, dysuria, flank pain, frequency, penile discharge, penile pain and penile swelling.   Musculoskeletal:  Negative for arthralgias and myalgias.   Skin:  Negative for color change, pallor, rash and wound.   Allergic/Immunologic: Negative.    Neurological:  Negative for dizziness, facial asymmetry, light-headedness and headaches.   Hematological:  Negative for adenopathy.   Psychiatric/Behavioral:  Negative for agitation and behavioral problems.      Objective:      Physical Exam  Vitals and nursing note reviewed.   Constitutional:       General:  He is not in acute distress.     Appearance: He is well-developed. He is not diaphoretic.   HENT:      Head: Normocephalic and atraumatic.   Cardiovascular:      Rate and Rhythm: Normal rate and regular rhythm.      Heart sounds: Normal heart sounds.   Pulmonary:      Effort: Pulmonary effort is normal. No respiratory distress.      Breath sounds: Normal breath sounds.   Genitourinary:     Comments: Deferred by pt   Skin:     General: Skin is warm and dry.      Findings: No rash.   Neurological:      Mental Status: He is alert and oriented to person, place, and time.   Psychiatric:         Behavior: Behavior normal.         Thought Content: Thought content normal.         Judgment: Judgment normal.       Assessment:       1. Itching of penis    2. Essential hypertension    3. Fatigue, unspecified type    4. MOSHE (obstructive sleep apnea)    5. Stage 3a chronic kidney disease    6. Insulin resistance    7. BMI 45.0-49.9, adult        Plan:       Juan Pablo was seen today for penis pain.    Diagnoses and all orders for this visit:    Itching of penis  -     POCT URINE DIPSTICK WITHOUT MICROSCOPE  -     C. trachomatis/N. gonorrhoeae by AMP DNA  -     fluconazole (DIFLUCAN) 150 MG Tab; Take 1 tablet (150 mg total) by mouth once daily. for 1 day  - Will treat as yeast and follow STI testing     Essential hypertension  -     CBC Auto Differential; Future  -     Comprehensive Metabolic Panel; Future  -     Lipid Panel; Future  - Chronic, stable on current meds. Continue     Fatigue, unspecified type  -     TSH; Future  -     TESTOSTERONE PANEL; Future  - Encouraged nightly CPAP, weight loss    MOSHE (obstructive sleep apnea)      - Chronic, encouraged nightly CPAP use   Stage 3a chronic kidney disease       - Chronic, will update labs today   Insulin resistance  -     Hemoglobin A1C; Future  - Diet and lifestyle discussed with pt.     BMI 45.0-49.9, adult      Labs today  Annual with DR. Hampton in 1 month  Follow up for worsening  or no improvement in symptoms and PRN.

## 2023-05-16 LAB
C TRACH DNA SPEC QL NAA+PROBE: NOT DETECTED
N GONORRHOEA DNA SPEC QL NAA+PROBE: NOT DETECTED

## 2023-08-02 ENCOUNTER — OFFICE VISIT (OUTPATIENT)
Dept: PRIMARY CARE CLINIC | Facility: CLINIC | Age: 43
End: 2023-08-02
Payer: COMMERCIAL

## 2023-08-02 VITALS
SYSTOLIC BLOOD PRESSURE: 132 MMHG | DIASTOLIC BLOOD PRESSURE: 80 MMHG | HEIGHT: 72 IN | BODY MASS INDEX: 42.66 KG/M2 | WEIGHT: 315 LBS | TEMPERATURE: 98 F | OXYGEN SATURATION: 97 % | HEART RATE: 83 BPM

## 2023-08-02 DIAGNOSIS — E78.5 HYPERLIPIDEMIA, UNSPECIFIED HYPERLIPIDEMIA TYPE: ICD-10-CM

## 2023-08-02 DIAGNOSIS — E78.2 MIXED HYPERLIPIDEMIA: ICD-10-CM

## 2023-08-02 DIAGNOSIS — E66.01 MORBID OBESITY WITH BMI OF 50.0-59.9, ADULT: ICD-10-CM

## 2023-08-02 DIAGNOSIS — E88.819 INSULIN RESISTANCE: ICD-10-CM

## 2023-08-02 DIAGNOSIS — Z00.00 ROUTINE GENERAL MEDICAL EXAMINATION AT HEALTH CARE FACILITY: Primary | ICD-10-CM

## 2023-08-02 DIAGNOSIS — G47.33 OSA (OBSTRUCTIVE SLEEP APNEA): ICD-10-CM

## 2023-08-02 DIAGNOSIS — N18.31 STAGE 3A CHRONIC KIDNEY DISEASE: ICD-10-CM

## 2023-08-02 DIAGNOSIS — G60.9 IDIOPATHIC PERIPHERAL NEUROPATHY: ICD-10-CM

## 2023-08-02 DIAGNOSIS — I10 ESSENTIAL HYPERTENSION: ICD-10-CM

## 2023-08-02 DIAGNOSIS — L20.84 INTRINSIC ECZEMA: ICD-10-CM

## 2023-08-02 PROCEDURE — 99214 PR OFFICE/OUTPT VISIT, EST, LEVL IV, 30-39 MIN: ICD-10-PCS | Mod: S$GLB,,, | Performed by: INTERNAL MEDICINE

## 2023-08-02 PROCEDURE — 3008F BODY MASS INDEX DOCD: CPT | Mod: CPTII,S$GLB,, | Performed by: INTERNAL MEDICINE

## 2023-08-02 PROCEDURE — 3075F PR MOST RECENT SYSTOLIC BLOOD PRESS GE 130-139MM HG: ICD-10-PCS | Mod: CPTII,S$GLB,, | Performed by: INTERNAL MEDICINE

## 2023-08-02 PROCEDURE — 99999 PR PBB SHADOW E&M-EST. PATIENT-LVL IV: ICD-10-PCS | Mod: PBBFAC,,, | Performed by: INTERNAL MEDICINE

## 2023-08-02 PROCEDURE — 4010F ACE/ARB THERAPY RXD/TAKEN: CPT | Mod: CPTII,S$GLB,, | Performed by: INTERNAL MEDICINE

## 2023-08-02 PROCEDURE — 99999 PR PBB SHADOW E&M-EST. PATIENT-LVL IV: CPT | Mod: PBBFAC,,, | Performed by: INTERNAL MEDICINE

## 2023-08-02 PROCEDURE — 3079F PR MOST RECENT DIASTOLIC BLOOD PRESSURE 80-89 MM HG: ICD-10-PCS | Mod: CPTII,S$GLB,, | Performed by: INTERNAL MEDICINE

## 2023-08-02 PROCEDURE — 99214 OFFICE O/P EST MOD 30 MIN: CPT | Mod: S$GLB,,, | Performed by: INTERNAL MEDICINE

## 2023-08-02 PROCEDURE — 4010F PR ACE/ARB THEARPY RXD/TAKEN: ICD-10-PCS | Mod: CPTII,S$GLB,, | Performed by: INTERNAL MEDICINE

## 2023-08-02 PROCEDURE — 3008F PR BODY MASS INDEX (BMI) DOCUMENTED: ICD-10-PCS | Mod: CPTII,S$GLB,, | Performed by: INTERNAL MEDICINE

## 2023-08-02 PROCEDURE — 1160F PR REVIEW ALL MEDS BY PRESCRIBER/CLIN PHARMACIST DOCUMENTED: ICD-10-PCS | Mod: CPTII,S$GLB,, | Performed by: INTERNAL MEDICINE

## 2023-08-02 PROCEDURE — 1159F MED LIST DOCD IN RCRD: CPT | Mod: CPTII,S$GLB,, | Performed by: INTERNAL MEDICINE

## 2023-08-02 PROCEDURE — 3075F SYST BP GE 130 - 139MM HG: CPT | Mod: CPTII,S$GLB,, | Performed by: INTERNAL MEDICINE

## 2023-08-02 PROCEDURE — 1160F RVW MEDS BY RX/DR IN RCRD: CPT | Mod: CPTII,S$GLB,, | Performed by: INTERNAL MEDICINE

## 2023-08-02 PROCEDURE — 3079F DIAST BP 80-89 MM HG: CPT | Mod: CPTII,S$GLB,, | Performed by: INTERNAL MEDICINE

## 2023-08-02 PROCEDURE — 1159F PR MEDICATION LIST DOCUMENTED IN MEDICAL RECORD: ICD-10-PCS | Mod: CPTII,S$GLB,, | Performed by: INTERNAL MEDICINE

## 2023-08-02 RX ORDER — CLOBETASOL PROPIONATE 0.5 MG/G
CREAM TOPICAL 2 TIMES DAILY
Qty: 30 G | Refills: 2 | Status: SHIPPED | OUTPATIENT
Start: 2023-08-02 | End: 2024-02-09

## 2023-08-02 RX ORDER — ATORVASTATIN CALCIUM 20 MG/1
20 TABLET, FILM COATED ORAL DAILY
Qty: 90 TABLET | Refills: 3 | Status: SHIPPED | OUTPATIENT
Start: 2023-08-02 | End: 2023-08-10

## 2023-08-02 RX ORDER — LISINOPRIL 40 MG/1
40 TABLET ORAL DAILY
Qty: 90 TABLET | Refills: 3 | Status: SHIPPED | OUTPATIENT
Start: 2023-08-02 | End: 2024-02-24 | Stop reason: SDUPTHER

## 2023-08-02 RX ORDER — GABAPENTIN 300 MG/1
300 CAPSULE ORAL 2 TIMES DAILY
Qty: 120 CAPSULE | Refills: 3 | Status: SHIPPED | OUTPATIENT
Start: 2023-08-02 | End: 2024-02-24 | Stop reason: SDUPTHER

## 2023-08-02 NOTE — PROGRESS NOTES
Subjective:       Patient ID: Juan Pablo Saeed is a 42 y.o. male.    Chief Complaint: Annual Exam (& Hand pain)      HPI:  Patient is a 42-year-old male presenting today for updated physical exam review of chronic health issues.  Patient has history of hypertension, insulin resistance, hyperlipidemia, sleep apnea, obesity, stage IIIA kidney disease.  Patient indicates he has been doing well overall.  He states medications as prescribed.  He is not had any major issues.      He is not been able take the Ozempic for the insulin resistance with the obesity.  He has issues that the insurance company did not cover it and he could not afford the out-of-pocket cost for it.    He is had ongoing issues for years with some irritation to the skin on his palms.  He gets the skin gets flaky and itchy is he gets little bumps.  It is on the palmar surfaces of both hands.  He is not had it treated before.    He continues uses CPAP consistently.    Review of Systems   Constitutional:  Negative for fever and unexpected weight change.   HENT:  Negative for hearing loss, postnasal drip and rhinorrhea.    Eyes:  Negative for pain and visual disturbance.   Respiratory:  Negative for cough, shortness of breath and wheezing.    Cardiovascular:  Negative for chest pain and palpitations.   Gastrointestinal:  Negative for constipation, diarrhea, nausea and vomiting.   Genitourinary:  Negative for dysuria and hematuria.   Musculoskeletal:  Negative for arthralgias, back pain, myalgias and neck stiffness.   Skin:  Positive for rash. Negative for pallor.   Neurological:  Negative for seizures, syncope and headaches.   Hematological:  Negative for adenopathy.   Psychiatric/Behavioral:  Negative for dysphoric mood. The patient is not nervous/anxious.        Objective:   /80   Pulse 83   Temp 97.5 °F (36.4 °C) (Tympanic)   Ht 6' (1.829 m)   Wt (!) 169.4 kg (373 lb 7.4 oz)   SpO2 97%   BMI 50.65 kg/m²      Physical Exam  Vitals  reviewed.   Constitutional:       General: He is not in acute distress.     Appearance: He is well-developed.   HENT:      Head: Normocephalic and atraumatic.      Right Ear: Tympanic membrane and ear canal normal.      Left Ear: Tympanic membrane and ear canal normal.   Eyes:      Pupils: Pupils are equal, round, and reactive to light.   Neck:      Thyroid: No thyromegaly.      Vascular: No JVD.   Cardiovascular:      Rate and Rhythm: Normal rate and regular rhythm.      Heart sounds: Normal heart sounds. No murmur heard.     No friction rub. No gallop.   Pulmonary:      Effort: Pulmonary effort is normal.      Breath sounds: Normal breath sounds. No wheezing or rales.   Abdominal:      General: Bowel sounds are normal. There is no distension.      Palpations: Abdomen is soft.      Tenderness: There is no abdominal tenderness. There is no guarding or rebound.   Musculoskeletal:         General: Normal range of motion.      Cervical back: Normal range of motion and neck supple.   Lymphadenopathy:      Cervical: No cervical adenopathy.   Skin:     General: Skin is warm and dry.      Findings: No rash.      Comments: Slight scaling of the skin of the palms bilaterally.   Neurological:      General: No focal deficit present.      Mental Status: He is alert and oriented to person, place, and time.      Cranial Nerves: No cranial nerve deficit.      Deep Tendon Reflexes: Reflexes are normal and symmetric.   Psychiatric:         Mood and Affect: Mood normal.         Judgment: Judgment normal.             Assessment:       1. Routine general medical examination at health care facility    2. Essential hypertension    3. Insulin resistance    4. Mixed hyperlipidemia    5. MOSHE (obstructive sleep apnea)    6. Morbid obesity with BMI of 50.0-59.9, adult    7. Stage 3a chronic kidney disease    8. Intrinsic eczema    9. Essential hypertension    10. Idiopathic peripheral neuropathy    11. Hyperlipidemia, unspecified  hyperlipidemia type        Plan:   Stage 3a chronic kidney disease  Avoid nsaids (ibuprofen, motrin, aleve,etc.), drink plenty of water.      Essential hypertension  Blood pressure is under good control.  We will continue the current regimen.  Will work on regular aerobic exercise and a low salt diet.        Mixed hyperlipidemia  Cholesterol numbers look good.  We will continue the current regimen at this time.  Remain focused on low fat diet and high dietary fiber intake.      MOSHE (obstructive sleep apnea)  Patient is compliant with use of cpap, using it the majority of nights.  Benefit from the use of the device is noted.      Morbid obesity with BMI of 50.0-59.9, adult  Could not afford ozempic.  Will continue to work on diet and exercise  Routine general medical examination at health care facility  -     CBC Auto Differential; Future; Expected date: 08/05/2023  -     Comprehensive Metabolic Panel; Future; Expected date: 08/05/2023  -     Lipid Panel; Future; Expected date: 08/05/2023  -     Hemoglobin A1C; Future; Expected date: 08/05/2023    Essential hypertension  -     lisinopriL (PRINIVIL,ZESTRIL) 40 MG tablet; Take 1 tablet (40 mg total) by mouth once daily.  Dispense: 90 tablet; Refill: 3    Insulin resistance    Mixed hyperlipidemia    MOSHE (obstructive sleep apnea)    Morbid obesity with BMI of 50.0-59.9, adult    Stage 3a chronic kidney disease    Intrinsic eczema    Essential hypertension  Comments:  stay on bp meds, stable  Orders:  -     lisinopriL (PRINIVIL,ZESTRIL) 40 MG tablet; Take 1 tablet (40 mg total) by mouth once daily.  Dispense: 90 tablet; Refill: 3    Idiopathic peripheral neuropathy  Comments:  Increase gabapentin to 300 mg twice daily for neuropathy.    Orders:  -     gabapentin (NEURONTIN) 300 MG capsule; Take 1 capsule (300 mg total) by mouth 2 (two) times daily.  Dispense: 120 capsule; Refill: 3    Hyperlipidemia, unspecified hyperlipidemia type  -     atorvastatin (LIPITOR) 20 MG  tablet; Take 1 tablet (20 mg total) by mouth once daily.  Dispense: 90 tablet; Refill: 3    Other orders  -     clobetasoL (TEMOVATE) 0.05 % cream; Apply topically 2 (two) times daily.  Dispense: 30 g; Refill: 2          Follow up in about 6 months (around 2/2/2024).

## 2023-08-08 ENCOUNTER — LAB VISIT (OUTPATIENT)
Dept: LAB | Facility: HOSPITAL | Age: 43
End: 2023-08-08
Attending: NURSE PRACTITIONER
Payer: COMMERCIAL

## 2023-08-08 DIAGNOSIS — Z00.00 ROUTINE GENERAL MEDICAL EXAMINATION AT HEALTH CARE FACILITY: ICD-10-CM

## 2023-08-08 DIAGNOSIS — R53.83 FATIGUE, UNSPECIFIED TYPE: ICD-10-CM

## 2023-08-08 PROCEDURE — 83036 HEMOGLOBIN GLYCOSYLATED A1C: CPT | Performed by: INTERNAL MEDICINE

## 2023-08-08 PROCEDURE — 84403 ASSAY OF TOTAL TESTOSTERONE: CPT | Performed by: NURSE PRACTITIONER

## 2023-08-08 PROCEDURE — 80053 COMPREHEN METABOLIC PANEL: CPT | Performed by: INTERNAL MEDICINE

## 2023-08-08 PROCEDURE — 84270 ASSAY OF SEX HORMONE GLOBUL: CPT | Performed by: NURSE PRACTITIONER

## 2023-08-08 PROCEDURE — 85025 COMPLETE CBC W/AUTO DIFF WBC: CPT | Performed by: INTERNAL MEDICINE

## 2023-08-08 PROCEDURE — 80061 LIPID PANEL: CPT | Performed by: INTERNAL MEDICINE

## 2023-08-08 PROCEDURE — 84443 ASSAY THYROID STIM HORMONE: CPT | Performed by: NURSE PRACTITIONER

## 2023-08-09 LAB
ALBUMIN SERPL BCP-MCNC: 3.9 G/DL (ref 3.5–5.2)
ALP SERPL-CCNC: 51 U/L (ref 55–135)
ALT SERPL W/O P-5'-P-CCNC: 24 U/L (ref 10–44)
ANION GAP SERPL CALC-SCNC: 12 MMOL/L (ref 8–16)
AST SERPL-CCNC: 24 U/L (ref 10–40)
BASOPHILS # BLD AUTO: 0.04 K/UL (ref 0–0.2)
BASOPHILS NFR BLD: 0.6 % (ref 0–1.9)
BILIRUB SERPL-MCNC: 0.7 MG/DL (ref 0.1–1)
BUN SERPL-MCNC: 16 MG/DL (ref 6–20)
CALCIUM SERPL-MCNC: 9.8 MG/DL (ref 8.7–10.5)
CHLORIDE SERPL-SCNC: 105 MMOL/L (ref 95–110)
CHOLEST SERPL-MCNC: 216 MG/DL (ref 120–199)
CHOLEST/HDLC SERPL: 6.2 {RATIO} (ref 2–5)
CO2 SERPL-SCNC: 23 MMOL/L (ref 23–29)
CREAT SERPL-MCNC: 1.6 MG/DL (ref 0.5–1.4)
DIFFERENTIAL METHOD: ABNORMAL
EOSINOPHIL # BLD AUTO: 0.1 K/UL (ref 0–0.5)
EOSINOPHIL NFR BLD: 1.4 % (ref 0–8)
ERYTHROCYTE [DISTWIDTH] IN BLOOD BY AUTOMATED COUNT: 13.6 % (ref 11.5–14.5)
EST. GFR  (NO RACE VARIABLE): 54.5 ML/MIN/1.73 M^2
ESTIMATED AVG GLUCOSE: 114 MG/DL (ref 68–131)
GLUCOSE SERPL-MCNC: 82 MG/DL (ref 70–110)
HBA1C MFR BLD: 5.6 % (ref 4–5.6)
HCT VFR BLD AUTO: 43 % (ref 40–54)
HDLC SERPL-MCNC: 35 MG/DL (ref 40–75)
HDLC SERPL: 16.2 % (ref 20–50)
HGB BLD-MCNC: 13.8 G/DL (ref 14–18)
IMM GRANULOCYTES # BLD AUTO: 0.04 K/UL (ref 0–0.04)
IMM GRANULOCYTES NFR BLD AUTO: 0.6 % (ref 0–0.5)
LDLC SERPL CALC-MCNC: 151 MG/DL (ref 63–159)
LYMPHOCYTES # BLD AUTO: 2.2 K/UL (ref 1–4.8)
LYMPHOCYTES NFR BLD: 35.2 % (ref 18–48)
MCH RBC QN AUTO: 26.6 PG (ref 27–31)
MCHC RBC AUTO-ENTMCNC: 32.1 G/DL (ref 32–36)
MCV RBC AUTO: 83 FL (ref 82–98)
MONOCYTES # BLD AUTO: 0.6 K/UL (ref 0.3–1)
MONOCYTES NFR BLD: 9.5 % (ref 4–15)
NEUTROPHILS # BLD AUTO: 3.3 K/UL (ref 1.8–7.7)
NEUTROPHILS NFR BLD: 52.7 % (ref 38–73)
NONHDLC SERPL-MCNC: 181 MG/DL
NRBC BLD-RTO: 0 /100 WBC
PLATELET # BLD AUTO: 263 K/UL (ref 150–450)
PMV BLD AUTO: 10.5 FL (ref 9.2–12.9)
POTASSIUM SERPL-SCNC: 4.4 MMOL/L (ref 3.5–5.1)
PROT SERPL-MCNC: 7.7 G/DL (ref 6–8.4)
RBC # BLD AUTO: 5.19 M/UL (ref 4.6–6.2)
SODIUM SERPL-SCNC: 140 MMOL/L (ref 136–145)
TRIGL SERPL-MCNC: 150 MG/DL (ref 30–150)
TSH SERPL DL<=0.005 MIU/L-ACNC: 1.49 UIU/ML (ref 0.4–4)
WBC # BLD AUTO: 6.23 K/UL (ref 3.9–12.7)

## 2023-08-10 ENCOUNTER — TELEPHONE (OUTPATIENT)
Dept: PRIMARY CARE CLINIC | Facility: CLINIC | Age: 43
End: 2023-08-10
Payer: COMMERCIAL

## 2023-08-10 DIAGNOSIS — E78.5 HYPERLIPIDEMIA, UNSPECIFIED HYPERLIPIDEMIA TYPE: ICD-10-CM

## 2023-08-10 RX ORDER — ATORVASTATIN CALCIUM 40 MG/1
40 TABLET, FILM COATED ORAL DAILY
Qty: 90 TABLET | Refills: 3 | Status: SHIPPED | OUTPATIENT
Start: 2023-08-10 | End: 2024-02-24 | Stop reason: SDUPTHER

## 2023-08-16 LAB
ALBUMIN SERPL-MCNC: 4.2 G/DL (ref 3.6–5.1)
SHBG SERPL-SCNC: 19 NMOL/L (ref 10–50)
TESTOST FREE SERPL-MCNC: 45.7 PG/ML (ref 46–224)
TESTOST SERPL-MCNC: 238 NG/DL (ref 250–1100)
TESTOSTERONE.FREE+WB SERPL-MCNC: 88.1 NG/DL (ref 110–575)

## 2023-08-17 DIAGNOSIS — R79.89 LOW TESTOSTERONE: Primary | ICD-10-CM

## 2023-08-21 ENCOUNTER — OFFICE VISIT (OUTPATIENT)
Dept: UROLOGY | Facility: CLINIC | Age: 43
End: 2023-08-21
Payer: COMMERCIAL

## 2023-08-21 ENCOUNTER — LAB VISIT (OUTPATIENT)
Dept: LAB | Facility: HOSPITAL | Age: 43
End: 2023-08-21
Attending: UROLOGY
Payer: COMMERCIAL

## 2023-08-21 VITALS
HEART RATE: 81 BPM | SYSTOLIC BLOOD PRESSURE: 140 MMHG | WEIGHT: 315 LBS | DIASTOLIC BLOOD PRESSURE: 85 MMHG | HEIGHT: 72 IN | BODY MASS INDEX: 42.66 KG/M2

## 2023-08-21 DIAGNOSIS — E29.1 TESTICULAR FAILURE: ICD-10-CM

## 2023-08-21 LAB
ESTRADIOL SERPL-MCNC: 28 PG/ML (ref 11–44)
FSH SERPL-ACNC: 4.03 MIU/ML (ref 0.95–11.95)
LH SERPL-ACNC: 1.6 MIU/ML (ref 0.6–12.1)
PROLACTIN SERPL IA-MCNC: 10.1 NG/ML (ref 3.5–19.4)
TESTOST SERPL-MCNC: 250 NG/DL (ref 304–1227)

## 2023-08-21 PROCEDURE — 3044F PR MOST RECENT HEMOGLOBIN A1C LEVEL <7.0%: ICD-10-PCS | Mod: CPTII,S$GLB,, | Performed by: UROLOGY

## 2023-08-21 PROCEDURE — 3008F BODY MASS INDEX DOCD: CPT | Mod: CPTII,S$GLB,, | Performed by: UROLOGY

## 2023-08-21 PROCEDURE — 3079F DIAST BP 80-89 MM HG: CPT | Mod: CPTII,S$GLB,, | Performed by: UROLOGY

## 2023-08-21 PROCEDURE — 82670 ASSAY OF TOTAL ESTRADIOL: CPT | Performed by: UROLOGY

## 2023-08-21 PROCEDURE — 4010F ACE/ARB THERAPY RXD/TAKEN: CPT | Mod: CPTII,S$GLB,, | Performed by: UROLOGY

## 2023-08-21 PROCEDURE — 1159F PR MEDICATION LIST DOCUMENTED IN MEDICAL RECORD: ICD-10-PCS | Mod: CPTII,S$GLB,, | Performed by: UROLOGY

## 2023-08-21 PROCEDURE — 3077F PR MOST RECENT SYSTOLIC BLOOD PRESSURE >= 140 MM HG: ICD-10-PCS | Mod: CPTII,S$GLB,, | Performed by: UROLOGY

## 2023-08-21 PROCEDURE — 3077F SYST BP >= 140 MM HG: CPT | Mod: CPTII,S$GLB,, | Performed by: UROLOGY

## 2023-08-21 PROCEDURE — 84146 ASSAY OF PROLACTIN: CPT | Performed by: UROLOGY

## 2023-08-21 PROCEDURE — 3044F HG A1C LEVEL LT 7.0%: CPT | Mod: CPTII,S$GLB,, | Performed by: UROLOGY

## 2023-08-21 PROCEDURE — 99203 OFFICE O/P NEW LOW 30 MIN: CPT | Mod: S$GLB,,, | Performed by: UROLOGY

## 2023-08-21 PROCEDURE — 1159F MED LIST DOCD IN RCRD: CPT | Mod: CPTII,S$GLB,, | Performed by: UROLOGY

## 2023-08-21 PROCEDURE — 99999 PR PBB SHADOW E&M-EST. PATIENT-LVL IV: ICD-10-PCS | Mod: PBBFAC,,, | Performed by: UROLOGY

## 2023-08-21 PROCEDURE — 84403 ASSAY OF TOTAL TESTOSTERONE: CPT | Performed by: UROLOGY

## 2023-08-21 PROCEDURE — 36415 COLL VENOUS BLD VENIPUNCTURE: CPT | Performed by: UROLOGY

## 2023-08-21 PROCEDURE — 99203 PR OFFICE/OUTPT VISIT, NEW, LEVL III, 30-44 MIN: ICD-10-PCS | Mod: S$GLB,,, | Performed by: UROLOGY

## 2023-08-21 PROCEDURE — 99999 PR PBB SHADOW E&M-EST. PATIENT-LVL IV: CPT | Mod: PBBFAC,,, | Performed by: UROLOGY

## 2023-08-21 PROCEDURE — 1160F PR REVIEW ALL MEDS BY PRESCRIBER/CLIN PHARMACIST DOCUMENTED: ICD-10-PCS | Mod: CPTII,S$GLB,, | Performed by: UROLOGY

## 2023-08-21 PROCEDURE — 83002 ASSAY OF GONADOTROPIN (LH): CPT | Performed by: UROLOGY

## 2023-08-21 PROCEDURE — 1160F RVW MEDS BY RX/DR IN RCRD: CPT | Mod: CPTII,S$GLB,, | Performed by: UROLOGY

## 2023-08-21 PROCEDURE — 83001 ASSAY OF GONADOTROPIN (FSH): CPT | Performed by: UROLOGY

## 2023-08-21 PROCEDURE — 4010F PR ACE/ARB THEARPY RXD/TAKEN: ICD-10-PCS | Mod: CPTII,S$GLB,, | Performed by: UROLOGY

## 2023-08-21 PROCEDURE — 3008F PR BODY MASS INDEX (BMI) DOCUMENTED: ICD-10-PCS | Mod: CPTII,S$GLB,, | Performed by: UROLOGY

## 2023-08-21 PROCEDURE — 3079F PR MOST RECENT DIASTOLIC BLOOD PRESSURE 80-89 MM HG: ICD-10-PCS | Mod: CPTII,S$GLB,, | Performed by: UROLOGY

## 2023-08-21 NOTE — LETTER
August 21, 2023        Ralph Padilla Jr., MD  [address]  [address]  [facility] [address]             [facility]  [address]  [facility] [address]  Phone: [phone]   Patient: Juan Pablo Saeed   MR Number: [id number]   YOB: 1980   Date of Visit: 8/21/2023       Dear Dr. Padilla:    Thank you for referring Juan Pablo Saeed to me for evaluation. Attached you will find relevant portions of my assessment and plan of care.    If you have questions, please do not hesitate to call me. I look forward to following Juan Pablo Saeed along with you.    Sincerely,      Roddy Cruz MD            CC  No Recipients    Enclosure

## 2023-08-21 NOTE — PROGRESS NOTES
"Chief Complaint:  Infertility    HPI:    Mr. Saeed is a 42 y.o.  male who has been  to his wife for the past 10 years. They have been trying to achieve a pregnancy for the past 15 years but without success. Juan Pablo Saeed has undergone a semen analysis x 1, but I don't have the results. He denies a history of erectile dysfunction and ejaculatory problems.    He has achieved 4 pregnancies in the past that resulted in miscarriage.    Lisa Saeed is 40 years old. ( [birth year]) Her menses are regular. She has undergone prior hysterosalpingogram. This was normal. She has achieved 5 prior pregnancies that resulted in miscarriage.  She sees Dr. Padilla.    The couple has undergone prior intrauterine insemination procedures x 2 without success.    The couple has undergone prior in-vitro fertilization procedures x 2 without success.    Juan Pablo Saeed denies a history of exposure to harmful chemicals, toxins, and radiation.    No history of recent fevers greater than 101.5 degrees Farenheit.    No history of recent exposure to "wet heat."    No history of urological trauma or testicular torsion.    No history of prostatitis, epididymitis, and orchitis.    No history of post-pubertal mumps.    There is no known family history of fertility problems.    REVIEW OF SYSTEMS:     He denies headache, blurred vision, fever, nausea, vomiting, chills, abdominal pain, chest pain, sore throat, bleeding per rectum, cough, SOB, recent loss of consciousness, recent mental status changes, seizures, dizziness, or upper or lower extremity weakness.    PHYSICAL EXAM:     The patient generally appears in good health, is appropriately interactive, and is in no apparent distress.     Eyes: anicteric sclerae, moist conjunctivae; no lid-lag; PERRLA     HENT: Atraumatic; oropharynx clear with moist mucous membranes and no mucosal ulcerations;normal hard and soft palate.  No evidence of lymphadenopathy.    Neck: Trachea midline.  No " "thyromegaly.    Skin: No lesions.    Mental: Cooperative with normal affect.  Is oriented to time, place, and person.    Neuro: Grossly intact.    Chest: Normal inspiratory effort.   No accessory muscles.  No audible wheezes.  Respirations symmetric on inspiration and expiration.    Heart: Regular rhythm.      Abdomen:  Soft, non-tender. No masses or organomegaly. Bladder is not palpable. No evidence of flank discomfort. No evidence of inguinal hernia.    Genitourinary: Penis is normal with no evidence of plaques or induration. Urethral meatus is normal. Scrotum is normal. Testes are descended bilaterally with no evidence of abnormal masses or tenderness. Epididymis, vas deferens, and cord structures are normal bilaterally.  Testicular volume is approximately 18 cc bilaterally.    Extremities: No cyanosis, clubbing, or edema.    IMPRESSION & PLAN:    Mr. Saeed is a 42 y.o.  male who has been  to his wife for the past 10 years. They have been trying to achieve a pregnancy for the past 15 years but without success. Juan Pablo Saeed has undergone a semen analysis x 1, but I don't have the results. He denies a history of erectile dysfunction and ejaculatory problems.    He has achieved 4 pregnancies in the past that resulted in miscarriage.    1.  FSH, LH, testosterone, prolactin, and estradiol serum levels today.  2.  Semen analysis x 1 more.  3.  Return to the clinic in 3 weeks to discuss test results and treatment plan.  4.  Recommend avoiding "wet heat."  5.  Recommend taking a multivitamin and 500 mg of vitamin c daily in addition to the multivitamin.  6.  Please send a copy of the note to Dr. Padilla.  Thank you for the consultation.    CC: Valerie      "

## 2023-08-21 NOTE — PROGRESS NOTES
CHIEF COMPLAINT:    Mr. Saeed is a 42 y.o. male presenting for a consultation at the request of Dr. Hampton. Patient presents with low testosterone on labs.    PRESENTING ILLNESS:    Juan Pablo Saeed is a 42 y.o. male referred for low Testosterone:238 on routine laboratory testing 8/8/23.    History of infertility for which they are going to repeat IVF implantation, last implantation 2 years prior.     LUTS present. Nocturia 2x. Not bothersome.    REVIEW OF SYSTEMS:    Juan Pablo Saeed denies headache, blurred vision, fever, nausea, vomiting, chills, abdominal pain, chest pain, sore throat, bleeding per rectum, cough, SOB, recent loss of consciousness, recent mental status changes, seizures, dizziness, or upper or lower extremity weakness.    NIKKI  1. 4  2. 5  3. 5  4. 4  5. 5      PATIENT HISTORY:    Past Medical History:   Diagnosis Date    Back pain     GERD (gastroesophageal reflux disease)     HTN (hypertension)     Mixed hyperlipidemia     Obesity        Past Surgical History:   Procedure Laterality Date    CIRCUMCISION  2010       Family History   Problem Relation Age of Onset    Arthritis Mother     Cancer Mother         stomach    Diabetes Brother     Alcohol abuse Brother     Stroke Maternal Grandmother        Social History     Socioeconomic History    Marital status:     Number of children: 0   Occupational History    Occupation: [employer]   Tobacco Use    Smoking status: Never    Smokeless tobacco: Never   Substance and Sexual Activity    Alcohol use: Yes     Comment: occasional    Drug use: Yes     Types: Marijuana    Sexual activity: Yes     Partners: Female     Social Determinants of Health     Financial Resource Strain: Low Risk  (4/8/2020)    Overall Financial Resource Strain (CARDIA)     Difficulty of Paying Living Expenses: Not very hard   Food Insecurity: No Food Insecurity (4/8/2020)    Hunger Vital Sign     Worried About Running Out of Food in the Last Year: Never true     Ran Out of  Food in the Last Year: Never true   Transportation Needs: No Transportation Needs (4/8/2020)    PRAPARE - Transportation     Lack of Transportation (Medical): No     Lack of Transportation (Non-Medical): No   Physical Activity: Insufficiently Active (4/8/2020)    Exercise Vital Sign     Days of Exercise per Week: 2 days     Minutes of Exercise per Session: 30 min   Stress: No Stress Concern Present (4/8/2020)    [employer] [place] [employer] - Occupational Stress Questionnaire     Feeling of Stress : Only a little   Social Connections: Unknown (4/8/2020)    Social Connection and Isolation Panel [NHANES]     Frequency of Communication with Friends and Family: More than three times a week     Frequency of Social Gatherings with Friends and Family: Patient refused     Active Member of Clubs or Organizations: Yes     Attends Club or Organization Meetings: More than 4 times per year     Marital Status:        Allergies:  Patient has no known allergies.    Medications:    Current Outpatient Medications:     acetaminophen (TYLENOL) 325 MG tablet, Take 325 mg by mouth every 6 (six) hours as needed for Pain., Disp: , Rfl:     atorvastatin (LIPITOR) 40 MG tablet, Take 1 tablet (40 mg total) by mouth once daily., Disp: 90 tablet, Rfl: 3    clobetasoL (TEMOVATE) 0.05 % cream, Apply topically 2 (two) times daily., Disp: 30 g, Rfl: 2    gabapentin (NEURONTIN) 300 MG capsule, Take 1 capsule (300 mg total) by mouth 2 (two) times daily., Disp: 120 capsule, Rfl: 3    lisinopriL (PRINIVIL,ZESTRIL) 40 MG tablet, Take 1 tablet (40 mg total) by mouth once daily., Disp: 90 tablet, Rfl: 3    multivitamin with minerals tablet, Take 1 tablet by mouth., Disp: , Rfl:     PHYSICAL EXAMINATION:    The patient generally appears in good health, is appropriately interactive, and is in no apparent distress.     Eyes: anicteric sclerae, moist conjunctivae; no lid-lag; PERRLA     HENT: Atraumatic; oropharynx clear with moist mucous  "membranes and no mucosal ulcerations;normal hard and soft palate.  No evidence of lymphadenopathy.    Neck: Trachea midline.  No thyromegaly.    Skin: No lesions.    Mental: Cooperative with normal affect.  Is oriented to time, place, and person.    Neuro: Grossly intact.    Chest: Normal inspiratory effort.   No accessory muscles.  No audible wheezes.  Respirations symmetric on inspiration and expiration.    Heart: Regular rhythm.      Abdomen:  Soft, non-tender. No masses or organomegaly. Bladder is not palpable. No evidence of flank discomfort. No evidence of inguinal hernia.    Genitourinary: The penis is circumcised with no evidence of plaques or induration. The urethral meatus is normal. The testes, epididymides, and cord structures are normal in size and contour bilaterally. The scrotum is normal in size and contour.    Normal anal sphincter tone. No rectal mass.    The prostate is *** g. Normal landmarks. Lateral sulci. Median furrow intact.  No nodularity or induration. Seminal vesicles are normal.    Extremities: No clubbing, cyanosis, or edema      LABS:    Testesterone 8/8/2023: 238  No results found for: "PSA", "PSADIAG", "PSATOTAL", "PSAFREE", "PSAFREEPCT"    IMPRESSION:    Encounter Diagnoses   Name Primary?    Low testosterone          PLAN:    1. Repeat early morning testosterone, LH, prolactin and FSH  2. SA 2x  3. Follow up results of SA and hormone panel    Copy to: Dr. Hampton      "

## 2023-08-31 ENCOUNTER — PATIENT MESSAGE (OUTPATIENT)
Dept: PRIMARY CARE CLINIC | Facility: CLINIC | Age: 43
End: 2023-08-31
Payer: COMMERCIAL

## 2023-09-11 ENCOUNTER — OFFICE VISIT (OUTPATIENT)
Dept: UROLOGY | Facility: CLINIC | Age: 43
End: 2023-09-11
Payer: COMMERCIAL

## 2023-09-11 VITALS
WEIGHT: 315 LBS | HEIGHT: 72 IN | BODY MASS INDEX: 42.66 KG/M2 | DIASTOLIC BLOOD PRESSURE: 83 MMHG | SYSTOLIC BLOOD PRESSURE: 118 MMHG | HEART RATE: 66 BPM

## 2023-09-11 DIAGNOSIS — E29.1 TESTICULAR FAILURE: Primary | ICD-10-CM

## 2023-09-11 PROCEDURE — 1160F RVW MEDS BY RX/DR IN RCRD: CPT | Mod: CPTII,S$GLB,, | Performed by: UROLOGY

## 2023-09-11 PROCEDURE — 3074F SYST BP LT 130 MM HG: CPT | Mod: CPTII,S$GLB,, | Performed by: UROLOGY

## 2023-09-11 PROCEDURE — 4010F ACE/ARB THERAPY RXD/TAKEN: CPT | Mod: CPTII,S$GLB,, | Performed by: UROLOGY

## 2023-09-11 PROCEDURE — 99214 OFFICE O/P EST MOD 30 MIN: CPT | Mod: S$GLB,,, | Performed by: UROLOGY

## 2023-09-11 PROCEDURE — 99214 PR OFFICE/OUTPT VISIT, EST, LEVL IV, 30-39 MIN: ICD-10-PCS | Mod: S$GLB,,, | Performed by: UROLOGY

## 2023-09-11 PROCEDURE — 3079F DIAST BP 80-89 MM HG: CPT | Mod: CPTII,S$GLB,, | Performed by: UROLOGY

## 2023-09-11 PROCEDURE — 3074F PR MOST RECENT SYSTOLIC BLOOD PRESSURE < 130 MM HG: ICD-10-PCS | Mod: CPTII,S$GLB,, | Performed by: UROLOGY

## 2023-09-11 PROCEDURE — 99999 PR PBB SHADOW E&M-EST. PATIENT-LVL III: ICD-10-PCS | Mod: PBBFAC,,, | Performed by: UROLOGY

## 2023-09-11 PROCEDURE — 3079F PR MOST RECENT DIASTOLIC BLOOD PRESSURE 80-89 MM HG: ICD-10-PCS | Mod: CPTII,S$GLB,, | Performed by: UROLOGY

## 2023-09-11 PROCEDURE — 4010F PR ACE/ARB THEARPY RXD/TAKEN: ICD-10-PCS | Mod: CPTII,S$GLB,, | Performed by: UROLOGY

## 2023-09-11 PROCEDURE — 3044F PR MOST RECENT HEMOGLOBIN A1C LEVEL <7.0%: ICD-10-PCS | Mod: CPTII,S$GLB,, | Performed by: UROLOGY

## 2023-09-11 PROCEDURE — 1159F PR MEDICATION LIST DOCUMENTED IN MEDICAL RECORD: ICD-10-PCS | Mod: CPTII,S$GLB,, | Performed by: UROLOGY

## 2023-09-11 PROCEDURE — 1160F PR REVIEW ALL MEDS BY PRESCRIBER/CLIN PHARMACIST DOCUMENTED: ICD-10-PCS | Mod: CPTII,S$GLB,, | Performed by: UROLOGY

## 2023-09-11 PROCEDURE — 3044F HG A1C LEVEL LT 7.0%: CPT | Mod: CPTII,S$GLB,, | Performed by: UROLOGY

## 2023-09-11 PROCEDURE — 3008F BODY MASS INDEX DOCD: CPT | Mod: CPTII,S$GLB,, | Performed by: UROLOGY

## 2023-09-11 PROCEDURE — 3008F PR BODY MASS INDEX (BMI) DOCUMENTED: ICD-10-PCS | Mod: CPTII,S$GLB,, | Performed by: UROLOGY

## 2023-09-11 PROCEDURE — 99999 PR PBB SHADOW E&M-EST. PATIENT-LVL III: CPT | Mod: PBBFAC,,, | Performed by: UROLOGY

## 2023-09-11 PROCEDURE — 1159F MED LIST DOCD IN RCRD: CPT | Mod: CPTII,S$GLB,, | Performed by: UROLOGY

## 2023-09-11 RX ORDER — ANASTROZOLE 1 MG/1
1 TABLET ORAL DAILY
Qty: 30 TABLET | Refills: 5 | Status: SHIPPED | OUTPATIENT
Start: 2023-09-11 | End: 2024-02-09

## 2023-09-11 RX ORDER — CEFDINIR 300 MG/1
300 CAPSULE ORAL 2 TIMES DAILY
COMMUNITY
Start: 2023-08-29 | End: 2024-02-09

## 2023-09-11 RX ORDER — CLOMIPHENE CITRATE 50 MG/1
TABLET ORAL
Qty: 15 TABLET | Refills: 5 | Status: SHIPPED | OUTPATIENT
Start: 2023-09-11

## 2023-09-11 NOTE — LETTER
September 11, 2023        Ralph Padilla Jr., MD  [address]  [address]  [facility] [address]             [facility]  [address]  [facility] [address]  Phone: [phone]   Patient: Juan Pablo Saeed   MR Number: [id number]   YOB: 1980   Date of Visit: 9/11/2023       Dear Dr. Padilla:    Thank you for referring Juan Pablo Saeed to me for evaluation. Attached you will find relevant portions of my assessment and plan of care.    If you have questions, please do not hesitate to call me. I look forward to following Juan Pablo Saeed along with you.    Sincerely,      Roddy Cruz MD            CC  No Recipients    Enclosure

## 2023-09-11 NOTE — PROGRESS NOTES
"Chief Complaint:  Infertility    HPI:    Mr. Saeed is a 42 y.o.  male who has been  to his wife for the past 10 years. They have been trying to achieve a pregnancy for the past 15 years but without success. 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  He denies a history of erectile dysfunction and ejaculatory problems.    Lab Results   Component Value Date    TOTALTESTOST 250 (L) 2023    LABLH 1.6 2023    FSH 4.03 2023    ESTRADIOL 28 2023    PROLACTIN 10.1 2023     SA 23([facility])--3.0 cc/7.3 million per cc/20%/3%    FOR REVIEW FROM PREVIOUS:  Mr. Saeed is a 42 y.o.  male who has been  to his wife for the past 10 years. They have been trying to achieve a pregnancy for the past 15 years but without success. Juan Pablo Saeed has undergone a semen analysis x 1, but I don't have the results. He denies a history of erectile dysfunction and ejaculatory problems.    He has achieved 4 pregnancies in the past that resulted in miscarriage.    Lisa Saeed is 40 years old. ( [birth year]) Her menses are regular. She has undergone prior hysterosalpingogram. This was normal. She has achieved 5 prior pregnancies that resulted in miscarriage.  She sees Dr. Padilla.    The couple has undergone prior intrauterine insemination procedures x 2 without success.    The couple has undergone prior in-vitro fertilization procedures x 2 without success.    Juan Pablo Saeed denies a history of exposure to harmful chemicals, toxins, and radiation.    No history of recent fevers greater than 101.5 degrees Farenheit.    No history of recent exposure to "wet heat."    No history of urological trauma or testicular torsion.    No history of prostatitis, epididymitis, and orchitis.    No history of post-pubertal mumps.    There is no known family history of fertility problems.    REVIEW OF SYSTEMS:     He denies headache, blurred vision, " fever, nausea, vomiting, chills, abdominal pain, chest pain, sore throat, bleeding per rectum, cough, SOB, recent loss of consciousness, recent mental status changes, seizures, dizziness, or upper or lower extremity weakness.    PHYSICAL EXAM:     The patient generally appears in good health, is appropriately interactive, and is in no apparent distress.     Eyes: anicteric sclerae, moist conjunctivae; no lid-lag; PERRLA     HENT: Atraumatic; oropharynx clear with moist mucous membranes and no mucosal ulcerations;normal hard and soft palate.  No evidence of lymphadenopathy.    Neck: Trachea midline.  No thyromegaly.    Skin: No lesions.    Mental: Cooperative with normal affect.  Is oriented to time, place, and person.    Neuro: Grossly intact.    Chest: Normal inspiratory effort.   No accessory muscles.  No audible wheezes.  Respirations symmetric on inspiration and expiration.    Heart: Regular rhythm.      Abdomen:  Soft, non-tender. No masses or organomegaly. Bladder is not palpable. No evidence of flank discomfort. No evidence of inguinal hernia.    Genitourinary: Penis is normal with no evidence of plaques or induration. Urethral meatus is normal. Scrotum is normal. Testes are descended bilaterally with no evidence of abnormal masses or tenderness. Epididymis, vas deferens, and cord structures are normal bilaterally.  Testicular volume is approximately 18 cc bilaterally.    Extremities: No cyanosis, clubbing, or edema.    IMPRESSION & PLAN:    Mr. Saeed is a 42 y.o.  male who has been  to his wife for the past 10 years. They have been trying to achieve a pregnancy for the past 15 years but without success. 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  He denies a history of erectile dysfunction and ejaculatory problems.    Lab Results   Component Value Date    TOTALTESTOST 250 (L) 08/21/2023    LABLH 1.6 08/21/2023    FSH 4.03 08/21/2023    ESTRADIOL 28  "08/21/2023    PROLACTIN 10.1 08/21/2023     SA 8/31/23([facility])--3.0 cc/7.3 million per cc/20%/3%    1.  Independently interpreted his labs and outside SA's today.   2.  Discussed that his T is low.  Will start clomid and arimidex. Side effects discussed.  A new Rx was given.  Will check a T and estradiol in 1 month.  3. RTC 3 months with a SA.  4.  Recommend avoiding "wet heat."  5.  Recommend taking a multivitamin and 500 mg of vitamin c daily in addition to the multivitamin.      CC: Valerie      "

## 2023-10-09 ENCOUNTER — LAB VISIT (OUTPATIENT)
Dept: LAB | Facility: HOSPITAL | Age: 43
End: 2023-10-09
Payer: COMMERCIAL

## 2023-10-09 DIAGNOSIS — E29.1 TESTICULAR FAILURE: ICD-10-CM

## 2023-10-09 LAB — ESTRADIOL SERPL-MCNC: 22 PG/ML (ref 11–44)

## 2023-10-09 PROCEDURE — 82670 ASSAY OF TOTAL ESTRADIOL: CPT | Performed by: UROLOGY

## 2023-10-09 PROCEDURE — 84403 ASSAY OF TOTAL TESTOSTERONE: CPT | Performed by: UROLOGY

## 2023-10-09 PROCEDURE — 36415 COLL VENOUS BLD VENIPUNCTURE: CPT | Performed by: UROLOGY

## 2023-10-10 LAB — TESTOST SERPL-MCNC: 810 NG/DL (ref 304–1227)

## 2023-10-14 ENCOUNTER — PATIENT MESSAGE (OUTPATIENT)
Dept: INTERNAL MEDICINE | Facility: CLINIC | Age: 43
End: 2023-10-14
Payer: COMMERCIAL

## 2023-10-16 NOTE — TELEPHONE ENCOUNTER
Spoke with pt notified he will have to have an appt for paperwork to be filled out verbalized understanding,

## 2023-10-19 ENCOUNTER — OFFICE VISIT (OUTPATIENT)
Dept: INTERNAL MEDICINE | Facility: CLINIC | Age: 43
End: 2023-10-19
Payer: COMMERCIAL

## 2023-10-19 VITALS
OXYGEN SATURATION: 98 % | SYSTOLIC BLOOD PRESSURE: 124 MMHG | HEART RATE: 90 BPM | WEIGHT: 315 LBS | BODY MASS INDEX: 42.66 KG/M2 | DIASTOLIC BLOOD PRESSURE: 92 MMHG | HEIGHT: 72 IN | TEMPERATURE: 98 F

## 2023-10-19 DIAGNOSIS — Z80.0 FAMILY HISTORY OF COLON CANCER: ICD-10-CM

## 2023-10-19 DIAGNOSIS — U07.1 COVID-19: ICD-10-CM

## 2023-10-19 DIAGNOSIS — R42 VERTIGO: Primary | ICD-10-CM

## 2023-10-19 DIAGNOSIS — I10 ESSENTIAL HYPERTENSION: ICD-10-CM

## 2023-10-19 DIAGNOSIS — K92.1 BLOOD IN STOOL: ICD-10-CM

## 2023-10-19 PROCEDURE — 3080F DIAST BP >= 90 MM HG: CPT | Mod: CPTII,S$GLB,, | Performed by: NURSE PRACTITIONER

## 2023-10-19 PROCEDURE — 99999 PR PBB SHADOW E&M-EST. PATIENT-LVL IV: CPT | Mod: PBBFAC,,, | Performed by: NURSE PRACTITIONER

## 2023-10-19 PROCEDURE — 4010F ACE/ARB THERAPY RXD/TAKEN: CPT | Mod: CPTII,S$GLB,, | Performed by: NURSE PRACTITIONER

## 2023-10-19 PROCEDURE — 3008F PR BODY MASS INDEX (BMI) DOCUMENTED: ICD-10-PCS | Mod: CPTII,S$GLB,, | Performed by: NURSE PRACTITIONER

## 2023-10-19 PROCEDURE — 99999 PR PBB SHADOW E&M-EST. PATIENT-LVL IV: ICD-10-PCS | Mod: PBBFAC,,, | Performed by: NURSE PRACTITIONER

## 2023-10-19 PROCEDURE — 99214 OFFICE O/P EST MOD 30 MIN: CPT | Mod: S$GLB,,, | Performed by: NURSE PRACTITIONER

## 2023-10-19 PROCEDURE — 3044F PR MOST RECENT HEMOGLOBIN A1C LEVEL <7.0%: ICD-10-PCS | Mod: CPTII,S$GLB,, | Performed by: NURSE PRACTITIONER

## 2023-10-19 PROCEDURE — 3074F SYST BP LT 130 MM HG: CPT | Mod: CPTII,S$GLB,, | Performed by: NURSE PRACTITIONER

## 2023-10-19 PROCEDURE — 99214 PR OFFICE/OUTPT VISIT, EST, LEVL IV, 30-39 MIN: ICD-10-PCS | Mod: S$GLB,,, | Performed by: NURSE PRACTITIONER

## 2023-10-19 PROCEDURE — 1160F RVW MEDS BY RX/DR IN RCRD: CPT | Mod: CPTII,S$GLB,, | Performed by: NURSE PRACTITIONER

## 2023-10-19 PROCEDURE — 3044F HG A1C LEVEL LT 7.0%: CPT | Mod: CPTII,S$GLB,, | Performed by: NURSE PRACTITIONER

## 2023-10-19 PROCEDURE — 4010F PR ACE/ARB THEARPY RXD/TAKEN: ICD-10-PCS | Mod: CPTII,S$GLB,, | Performed by: NURSE PRACTITIONER

## 2023-10-19 PROCEDURE — 1160F PR REVIEW ALL MEDS BY PRESCRIBER/CLIN PHARMACIST DOCUMENTED: ICD-10-PCS | Mod: CPTII,S$GLB,, | Performed by: NURSE PRACTITIONER

## 2023-10-19 PROCEDURE — 3008F BODY MASS INDEX DOCD: CPT | Mod: CPTII,S$GLB,, | Performed by: NURSE PRACTITIONER

## 2023-10-19 PROCEDURE — 3080F PR MOST RECENT DIASTOLIC BLOOD PRESSURE >= 90 MM HG: ICD-10-PCS | Mod: CPTII,S$GLB,, | Performed by: NURSE PRACTITIONER

## 2023-10-19 PROCEDURE — 1159F MED LIST DOCD IN RCRD: CPT | Mod: CPTII,S$GLB,, | Performed by: NURSE PRACTITIONER

## 2023-10-19 PROCEDURE — 1159F PR MEDICATION LIST DOCUMENTED IN MEDICAL RECORD: ICD-10-PCS | Mod: CPTII,S$GLB,, | Performed by: NURSE PRACTITIONER

## 2023-10-19 PROCEDURE — 3074F PR MOST RECENT SYSTOLIC BLOOD PRESSURE < 130 MM HG: ICD-10-PCS | Mod: CPTII,S$GLB,, | Performed by: NURSE PRACTITIONER

## 2023-10-19 NOTE — PROGRESS NOTES
Subjective:       Patient ID: Juan Pablo Saeed is a 43 y.o. male.    Chief Complaint: Follow-up (Needs to be cleared to go back to work)    Pt presents to clinic today for work clearance  He had an episodes of vertigo and dizziness last week  Then a few days later was dx with COVID  He is now 5 days out from that dx and reports all his symptoms have resolved  He denies any continued dizziness  He has not had to take any medication for it   Able to go from sitting to standing without problems  No lightheadedness with position changes    Pt also reports he has noticed over the past 6 months he has had issues with blood in his stool  He had this issue a few years back but it resolved  He reports blood on the tissue, in the stool and sometimes when he wipes  Comes and goes  Does suffer with constipation from time to time and does notice the blood is worse during that   Grandfather with colon cancer, mother questionable colon cancer-  in       BP (!) 124/92   Pulse 90   Temp 97.8 °F (36.6 °C) (Tympanic)   Ht 6' (1.829 m)   Wt (!) 167.9 kg (370 lb 2.4 oz)   SpO2 98%   BMI 50.20 kg/m²     Review of Systems   Constitutional:  Negative for activity change, appetite change, chills, diaphoresis, fatigue, fever and unexpected weight change.   HENT: Negative.     Eyes: Negative.    Respiratory:  Negative for apnea, chest tightness, shortness of breath and stridor.    Cardiovascular:  Negative for chest pain, palpitations and leg swelling.   Gastrointestinal:  Positive for blood in stool and constipation. Negative for abdominal distention and abdominal pain.   Endocrine: Negative.    Genitourinary: Negative.    Musculoskeletal:  Negative for arthralgias and myalgias.   Skin:  Negative for color change, pallor, rash and wound.   Allergic/Immunologic: Negative.    Neurological:  Negative for dizziness, facial asymmetry, light-headedness and headaches.   Hematological:  Negative for adenopathy.    Psychiatric/Behavioral:  Negative for agitation and behavioral problems.        Objective:      Physical Exam  Vitals reviewed.   Constitutional:       General: He is not in acute distress.     Appearance: Normal appearance. He is obese.   HENT:      Head: Normocephalic.      Right Ear: Tympanic membrane normal.      Left Ear: Tympanic membrane normal.      Nose: Nose normal.   Eyes:      Conjunctiva/sclera: Conjunctivae normal.      Pupils: Pupils are equal, round, and reactive to light.   Cardiovascular:      Rate and Rhythm: Normal rate.      Pulses: Normal pulses.   Pulmonary:      Effort: Pulmonary effort is normal.   Abdominal:      General: Abdomen is flat.      Palpations: Abdomen is soft.   Musculoskeletal:         General: Normal range of motion.      Cervical back: Normal range of motion.   Skin:     General: Skin is warm.      Capillary Refill: Capillary refill takes less than 2 seconds.   Neurological:      Mental Status: He is alert and oriented to person, place, and time. Mental status is at baseline.   Psychiatric:         Mood and Affect: Mood normal.         Assessment:       1. Vertigo    2. COVID-19    3. Essential hypertension    4. Blood in stool    5. Family history of colon cancer    6. BMI 50.0-59.9, adult        Plan:       Juan Pablo was seen today for follow-up.    Diagnoses and all orders for this visit:    Vertigo    COVID-19    Essential hypertension    Blood in stool  -     Ambulatory referral/consult to Gastroenterology; Future    Family history of colon cancer  -     Ambulatory referral/consult to Gastroenterology; Future    BMI 50.0-59.9, adult      There is no further episodes of vertigo or dizziness  He is out of his covid quarantine  Will clear to return to work tomorrow  GI referral for blood in stool  Follow up for worsening or no improvement in symptoms and PRN.

## 2023-10-31 ENCOUNTER — OFFICE VISIT (OUTPATIENT)
Dept: SURGERY | Facility: CLINIC | Age: 43
End: 2023-10-31
Payer: COMMERCIAL

## 2023-10-31 VITALS
OXYGEN SATURATION: 99 % | WEIGHT: 315 LBS | DIASTOLIC BLOOD PRESSURE: 85 MMHG | RESPIRATION RATE: 19 BRPM | HEART RATE: 81 BPM | SYSTOLIC BLOOD PRESSURE: 132 MMHG | BODY MASS INDEX: 50.18 KG/M2

## 2023-10-31 DIAGNOSIS — Z80.0 FAMILY HISTORY OF COLON CANCER: Primary | ICD-10-CM

## 2023-10-31 DIAGNOSIS — K92.1 BLOOD IN STOOL: ICD-10-CM

## 2023-10-31 PROCEDURE — 4010F PR ACE/ARB THEARPY RXD/TAKEN: ICD-10-PCS | Mod: CPTII,S$GLB,,

## 2023-10-31 PROCEDURE — 3044F HG A1C LEVEL LT 7.0%: CPT | Mod: CPTII,S$GLB,,

## 2023-10-31 PROCEDURE — 99999 PR PBB SHADOW E&M-EST. PATIENT-LVL V: CPT | Mod: PBBFAC,,,

## 2023-10-31 PROCEDURE — 1159F PR MEDICATION LIST DOCUMENTED IN MEDICAL RECORD: ICD-10-PCS | Mod: CPTII,S$GLB,,

## 2023-10-31 PROCEDURE — 3075F SYST BP GE 130 - 139MM HG: CPT | Mod: CPTII,S$GLB,,

## 2023-10-31 PROCEDURE — 99999 PR PBB SHADOW E&M-EST. PATIENT-LVL V: ICD-10-PCS | Mod: PBBFAC,,,

## 2023-10-31 PROCEDURE — 3075F PR MOST RECENT SYSTOLIC BLOOD PRESS GE 130-139MM HG: ICD-10-PCS | Mod: CPTII,S$GLB,,

## 2023-10-31 PROCEDURE — 1159F MED LIST DOCD IN RCRD: CPT | Mod: CPTII,S$GLB,,

## 2023-10-31 PROCEDURE — 99204 PR OFFICE/OUTPT VISIT, NEW, LEVL IV, 45-59 MIN: ICD-10-PCS | Mod: S$GLB,,,

## 2023-10-31 PROCEDURE — 4010F ACE/ARB THERAPY RXD/TAKEN: CPT | Mod: CPTII,S$GLB,,

## 2023-10-31 PROCEDURE — 3008F PR BODY MASS INDEX (BMI) DOCUMENTED: ICD-10-PCS | Mod: CPTII,S$GLB,,

## 2023-10-31 PROCEDURE — 3079F DIAST BP 80-89 MM HG: CPT | Mod: CPTII,S$GLB,,

## 2023-10-31 PROCEDURE — 99204 OFFICE O/P NEW MOD 45 MIN: CPT | Mod: S$GLB,,,

## 2023-10-31 PROCEDURE — 3044F PR MOST RECENT HEMOGLOBIN A1C LEVEL <7.0%: ICD-10-PCS | Mod: CPTII,S$GLB,,

## 2023-10-31 PROCEDURE — 3008F BODY MASS INDEX DOCD: CPT | Mod: CPTII,S$GLB,,

## 2023-10-31 PROCEDURE — 3079F PR MOST RECENT DIASTOLIC BLOOD PRESSURE 80-89 MM HG: ICD-10-PCS | Mod: CPTII,S$GLB,,

## 2023-10-31 RX ORDER — SODIUM, POTASSIUM,MAG SULFATES 17.5-3.13G
1 SOLUTION, RECONSTITUTED, ORAL ORAL DAILY
Qty: 1 KIT | Refills: 0 | Status: SHIPPED | OUTPATIENT
Start: 2023-10-31 | End: 2023-11-01 | Stop reason: ALTCHOICE

## 2023-11-01 ENCOUNTER — PATIENT MESSAGE (OUTPATIENT)
Dept: SURGERY | Facility: CLINIC | Age: 43
End: 2023-11-01
Payer: COMMERCIAL

## 2023-11-01 NOTE — PROGRESS NOTES
History & Physical    SUBJECTIVE:     CC: rectal bleeding   Ref: Mary Savage NP     History of Present Illness:  Patient is a 43 y.o. male presents with painless rectal bleeding, rectal mucus, hematochezia.  Patient states that he has had painless rectal bleeding for the last several years; however, it is now more frequent.  Patient reports mucous per rectum that leaks that started earlier this year.  He does report occasional hematochezia.  Patient states that the rectal bleeding drips in the toilet with bowel movements; also has blood dripping without a bowel movement occasionally at night or on the floor with urinating.  Mucous described as clear/yellow that also leaks without bowel movements occasionally.  Patient denies any rectal pain.  Reports worsened constipation lately.  Patient reports daily bowel movement, does not take any fiber, stool softeners, laxatives, does have to strain and has hard stools frequently, drinks more than 64 oz of water daily, sits on the toilet for more than 5 minutes at a time.  Patient states that he has left arm prescription steroid suppositories for hemorrhoids that he uses p.r.n. which have not really helped with any of his symptoms lately.  Family history includes maternal grandfather with colon cancer and mother with colon cancer - patient unsure of mother's age of diagnosis as he states that she has passed away, he remembers her having multiple colon surgeries and was told that she had some sort of intestinal cancer.  Patient not on anticoagulation.  Patient states that he previously had a colonoscopy age 24 due to fam hx with no abnormalities. Denies nausea, vomiting, fevers, chills., unintentional weight changes.    Chief Complaint   Patient presents with    Hemorrhoids       Review of patient's allergies indicates:  No Known Allergies    Current Outpatient Medications   Medication Sig Dispense Refill    acetaminophen (TYLENOL) 325 MG tablet Take 325 mg by mouth every 6  (six) hours as needed for Pain.      anastrozole (ARIMIDEX) 1 mg Tab Take 1 tablet (1 mg total) by mouth once daily. 30 tablet 5    atorvastatin (LIPITOR) 40 MG tablet Take 1 tablet (40 mg total) by mouth once daily. 90 tablet 3    cefdinir (OMNICEF) 300 MG capsule Take 300 mg by mouth 2 (two) times daily.      clobetasoL (TEMOVATE) 0.05 % cream Apply topically 2 (two) times daily. (Patient not taking: Reported on 10/19/2023) 30 g 2    clomiPHENE (CLOMID) 50 mg tablet Take 1/2 PO QD 15 tablet 5    gabapentin (NEURONTIN) 300 MG capsule Take 1 capsule (300 mg total) by mouth 2 (two) times daily. 120 capsule 3    lisinopriL (PRINIVIL,ZESTRIL) 40 MG tablet Take 1 tablet (40 mg total) by mouth once daily. 90 tablet 3    multivitamin with minerals tablet Take 1 tablet by mouth.      sodium,potassium,mag sulfates (SUPREP BOWEL PREP KIT) 17.5-3.13-1.6 gram SolR Take 177 mLs by mouth once daily. for 2 days 1 kit 0     No current facility-administered medications for this visit.       Past Medical History:   Diagnosis Date    Back pain     GERD (gastroesophageal reflux disease)     HTN (hypertension)     Mixed hyperlipidemia     Obesity      Past Surgical History:   Procedure Laterality Date    CIRCUMCISION  2010     Family History   Problem Relation Age of Onset    Arthritis Mother     Cancer Mother         stomach    Diabetes Brother     Alcohol abuse Brother     Stroke Maternal Grandmother      Social History     Tobacco Use    Smoking status: Former     Types: Cigars     Passive exposure: Never    Smokeless tobacco: Never   Substance Use Topics    Alcohol use: Yes     Comment: occasional    Drug use: Yes     Types: Marijuana        Review of Systems:  Review of Systems   Constitutional:  Negative for activity change, appetite change, chills, fatigue, fever and unexpected weight change.   HENT:  Negative for congestion.    Eyes:  Negative for visual disturbance.   Respiratory:  Negative for shortness of breath.     Cardiovascular:  Negative for chest pain.   Gastrointestinal:  Positive for anal bleeding, blood in stool and constipation. Negative for abdominal distention, abdominal pain, diarrhea, nausea, rectal pain and vomiting.   Genitourinary:  Negative for dysuria.   Musculoskeletal:  Negative for arthralgias.   Skin:  Negative for rash.   Neurological:  Negative for light-headedness.   Hematological:  Negative for adenopathy.       OBJECTIVE:     Vital Signs (Most Recent)  Pulse: 81 (10/31/23 1308)  Resp: 19 (10/31/23 1308)  BP: 132/85 (10/31/23 1308)  SpO2: 99 % (10/31/23 1308)     (!) 167.8 kg (370 lb)     Physical Exam:  Physical Exam  Vitals and nursing note reviewed. Exam conducted with a chaperone present.   Constitutional:       General: He is not in acute distress.     Appearance: Normal appearance. He is well-developed. He is not ill-appearing or toxic-appearing.   HENT:      Head: Normocephalic and atraumatic.      Right Ear: External ear normal.      Left Ear: External ear normal.      Nose: Nose normal.   Cardiovascular:      Rate and Rhythm: Normal rate.   Pulmonary:      Effort: Pulmonary effort is normal. No respiratory distress.   Abdominal:      General: There is no distension.      Palpations: Abdomen is soft.      Tenderness: There is no abdominal tenderness.   Genitourinary:     Comments: Anorectal: +prolapsing left lateral internal hemorrhoid - soft/NT/NT. No other external masses or lesions. No TTP. No abscess or fluctuance/induration palpated. ANSELMO difficult due to pt clenching; good tone, no blood, and no palpable masses or lesions - exam limited. Anoscopy attempted with moderately enlarged left lateral internal hemorrhoid; anoscopy aborted due to pt clenching/difficult exam.   Musculoskeletal:         General: Normal range of motion.      Cervical back: Normal range of motion and neck supple.   Skin:     General: Skin is warm and dry.   Neurological:      Mental Status: He is alert and oriented  to person, place, and time.   Psychiatric:         Mood and Affect: Mood normal.         Behavior: Behavior normal.       Laboratory  CBC: Reviewed  CMP: Reviewed    ASSESSMENT/PLAN:     43 y.o. male with painless rectal bleeding with fam hx of colon cancer in 1st degree relative    -Exam difficult due to pt clenching; unable to get a full exam. Pt with painless rectal bleeding and mucus. He also describes hematochezia. Pt with famhx of colon cancer in mother, so he is due for screening colonoscopy. Discussed that better exam will occur during colonoscopy under anesthesia. If no other cause of bleeding/mucus found on colonoscopy, can get hemorrhoid banding during procedure; I do not believe that pt would be able to tolerate in office hemorrhoid banding. Will plan for colonoscopy + possible banding 11/9/23 at 7AM. Golytely to pharmacy.   -Discussed causes and treatment of hemorrhoidal disease including improvement in bowel habits, banding, and excisional hemorrhoidectomy. Patient elected to proceed with improvement of bowel habits + possible banding.  -Discussed that a minimum I would recommend behavioral, lifestyle and medication modifications to improve bowel habits. Usual bowel management handout given to patient. This includes a stool softener twice per day, fiber powder supplementation daily, drinking at least 64oz of water/day, avoiding straining with bowel movements, spending less than 5 min on toilet per bowel movement, eating a high fiber diet, using miralax as needed to achieve a bowel movement daily and using wet wipes to wipe after bowel movements when irritated.   -rtc prn or if symptoms fail to improve/worsen after colonoscopy    Fay Sheldon PA-C  Colon and Rectal Surgery  [facility]

## 2023-11-09 ENCOUNTER — ANESTHESIA (OUTPATIENT)
Dept: ENDOSCOPY | Facility: HOSPITAL | Age: 43
End: 2023-11-09
Payer: COMMERCIAL

## 2023-11-09 ENCOUNTER — HOSPITAL ENCOUNTER (OUTPATIENT)
Facility: HOSPITAL | Age: 43
Discharge: HOME | End: 2023-11-09
Attending: STUDENT IN AN ORGANIZED HEALTH CARE EDUCATION/TRAINING PROGRAM | Admitting: STUDENT IN AN ORGANIZED HEALTH CARE EDUCATION/TRAINING PROGRAM
Payer: COMMERCIAL

## 2023-11-09 ENCOUNTER — ANESTHESIA EVENT (OUTPATIENT)
Dept: ENDOSCOPY | Facility: HOSPITAL | Age: 43
End: 2023-11-09
Payer: COMMERCIAL

## 2023-11-09 DIAGNOSIS — K92.1 BLOOD IN STOOL: Primary | ICD-10-CM

## 2023-11-09 PROCEDURE — 27201022: Performed by: STUDENT IN AN ORGANIZED HEALTH CARE EDUCATION/TRAINING PROGRAM

## 2023-11-09 PROCEDURE — 25000003 PHARM REV CODE 250: Performed by: STUDENT IN AN ORGANIZED HEALTH CARE EDUCATION/TRAINING PROGRAM

## 2023-11-09 PROCEDURE — 63600175 PHARM REV CODE 636 W HCPCS: Performed by: NURSE ANESTHETIST, CERTIFIED REGISTERED

## 2023-11-09 PROCEDURE — 37000008 HC ANESTHESIA 1ST 15 MINUTES: Performed by: STUDENT IN AN ORGANIZED HEALTH CARE EDUCATION/TRAINING PROGRAM

## 2023-11-09 PROCEDURE — 46221 PR HEMORRHOIDECTOMY INTERNAL RUBBER BAND LIGATIONS: ICD-10-PCS | Mod: ,,, | Performed by: STUDENT IN AN ORGANIZED HEALTH CARE EDUCATION/TRAINING PROGRAM

## 2023-11-09 PROCEDURE — 45378 DIAGNOSTIC COLONOSCOPY: CPT | Mod: 51,,, | Performed by: STUDENT IN AN ORGANIZED HEALTH CARE EDUCATION/TRAINING PROGRAM

## 2023-11-09 PROCEDURE — 45378 PR COLONOSCOPY,DIAGNOSTIC: ICD-10-PCS | Mod: 51,,, | Performed by: STUDENT IN AN ORGANIZED HEALTH CARE EDUCATION/TRAINING PROGRAM

## 2023-11-09 PROCEDURE — 46221 LIGATION OF HEMORRHOID(S): CPT | Performed by: STUDENT IN AN ORGANIZED HEALTH CARE EDUCATION/TRAINING PROGRAM

## 2023-11-09 PROCEDURE — 25000003 PHARM REV CODE 250: Performed by: NURSE ANESTHETIST, CERTIFIED REGISTERED

## 2023-11-09 PROCEDURE — 45378 DIAGNOSTIC COLONOSCOPY: CPT | Performed by: STUDENT IN AN ORGANIZED HEALTH CARE EDUCATION/TRAINING PROGRAM

## 2023-11-09 PROCEDURE — 37000009 HC ANESTHESIA EA ADD 15 MINS: Performed by: STUDENT IN AN ORGANIZED HEALTH CARE EDUCATION/TRAINING PROGRAM

## 2023-11-09 PROCEDURE — 46221 LIGATION OF HEMORRHOID(S): CPT | Mod: ,,, | Performed by: STUDENT IN AN ORGANIZED HEALTH CARE EDUCATION/TRAINING PROGRAM

## 2023-11-09 RX ORDER — DEXTROMETHORPHAN/PSEUDOEPHED 2.5-7.5/.8
DROPS ORAL
Status: DISCONTINUED | OUTPATIENT
Start: 2023-11-09 | End: 2023-11-09 | Stop reason: HOSPADM

## 2023-11-09 RX ORDER — PROPOFOL 10 MG/ML
VIAL (ML) INTRAVENOUS
Status: DISCONTINUED | OUTPATIENT
Start: 2023-11-09 | End: 2023-11-09

## 2023-11-09 RX ORDER — LIDOCAINE HYDROCHLORIDE 10 MG/ML
INJECTION, SOLUTION EPIDURAL; INFILTRATION; INTRACAUDAL; PERINEURAL
Status: DISCONTINUED | OUTPATIENT
Start: 2023-11-09 | End: 2023-11-09

## 2023-11-09 RX ORDER — SODIUM CHLORIDE, SODIUM LACTATE, POTASSIUM CHLORIDE, CALCIUM CHLORIDE 600; 310; 30; 20 MG/100ML; MG/100ML; MG/100ML; MG/100ML
INJECTION, SOLUTION INTRAVENOUS CONTINUOUS PRN
Status: DISCONTINUED | OUTPATIENT
Start: 2023-11-09 | End: 2023-11-09

## 2023-11-09 RX ADMIN — PROPOFOL 50 MG: 10 INJECTION, EMULSION INTRAVENOUS at 07:11

## 2023-11-09 RX ADMIN — PROPOFOL 50 MG: 10 INJECTION, EMULSION INTRAVENOUS at 06:11

## 2023-11-09 RX ADMIN — SODIUM CHLORIDE, SODIUM LACTATE, POTASSIUM CHLORIDE, AND CALCIUM CHLORIDE: 600; 310; 30; 20 INJECTION, SOLUTION INTRAVENOUS at 06:11

## 2023-11-09 RX ADMIN — LIDOCAINE HYDROCHLORIDE 50 MG: 10 SOLUTION INTRAVENOUS at 06:11

## 2023-11-09 NOTE — [employer] PATIENT INSTRUCTIONS
Discharge Summary/Instructions after an Endoscopic Procedure  Patient Name: Juan Pablo Saeed  Patient MRN: [id number]  Patient YOB: 1980 Thursday, November 9, 2023 Ce Herrera MD  Dear patient,  As a result of recent federal legislation (The Federal Cures Act), you may   receive lab or pathology results from your procedure in your [facility]   account before your physician is able to contact you. Your physician or   their representative will relay the results to you with their   recommendations at their soonest availability.  Thank you,  RESTRICTIONS:  During your procedure today, you received medications for sedation.  These   medications may affect your judgment, balance and coordination.  Therefore,   for 24 hours, you have the following restrictions:   - DO NOT drive a car, operate machinery, make legal/financial decisions,   sign important papers or drink alcohol.    ACTIVITY:  Today: no heavy lifting, straining or running due to procedural   sedation/anesthesia.  The following day: return to full activity including work.  DIET:  Eat and drink normally unless instructed otherwise.     TREATMENT FOR COMMON SIDE EFFECTS:  - Mild abdominal pain, nausea, belching, bloating or excessive gas:  rest,   eat lightly and use a heating pad.  - Sore Throat: treat with throat lozenges and/or gargle with warm salt   water.  - Because air was used during the procedure, expelling large amounts of air   from your rectum or belching is normal.  - If a bowel prep was taken, you may not have a bowel movement for 1-3 days.    This is normal.  SYMPTOMS TO WATCH FOR AND REPORT TO YOUR PHYSICIAN:  1. Abdominal pain or bloating, other than gas cramps.  2. Chest pain.  3. Back pain.  4. Signs of infection such as: chills or fever occurring within 24 hours   after the procedure.  5. Rectal bleeding, which would show as bright red, maroon, or black stools.   (A tablespoon of blood from the rectum is not serious, especially if    hemorrhoids are present.)  6. Vomiting.  7. Weakness or dizziness.  GO DIRECTLY TO THE NEAREST EMERGENCY ROOM IF YOU HAVE ANY OF THE FOLLOWING:      Difficulty breathing              Chills and/or fever over 101 F   Persistent vomiting and/or vomiting blood   Severe abdominal pain   Severe chest pain   Black, tarry stools   Bleeding- more than one tablespoon   Any other symptom or condition that you feel may need urgent attention  Your doctor recommends these additional instructions:  If any biopsies were taken, your doctors clinic will contact you in 1 to 2   weeks with any results.  - Discharge patient to home (ambulatory).   - Patient has a contact number available for emergencies.  The signs and   symptoms of potential delayed complications were discussed with the   patient.  Return to normal activities tomorrow.  Written discharge   instructions were provided to the patient.   - Resume previous diet.   - Continue present medications.   - Return to primary care physician as previously scheduled.   - Repeat colonoscopy in 7-10 years for screening purposes.  For questions, problems or results please call your physician Ce Herrera MD at Work:  [phone]  If you have any questions about the above instructions, call the GI   department at [phone] or call the endoscopy unit at [phone]   from 7am until 3 pm.  [facility], EMERGENCY ROOM PHONE NUMBER:   [phone]  IF A COMPLICATION OR EMERGENCY SITUATION ARISES AND YOU ARE UNABLE TO REACH   YOUR PHYSICIAN - GO DIRECTLY TO THE EMERGENCY ROOM.  I have read or have had read to me these discharge instructions for my   procedure and have received a written copy.  I understand these   instructions and will follow-up with my physician if I have any questions.     __________________________________       _____________________________________  Nurse Signature                                          Patient/Designated    Responsible Party Signature  Ce Herrera MD  11/9/2023 7:23:46 AM  This report has been verified and signed electronically.  Dear patient,  As a result of recent federal legislation (The Federal Cures Act), you may   receive lab or pathology results from your procedure in your [facility]   account before your physician is able to contact you. Your physician or   their representative will relay the results to you with their   recommendations at their soonest availability.  Thank you,  [employer]ATION

## 2023-11-09 NOTE — ANESTHESIA POSTPROCEDURE EVALUATION
Anesthesia Post Evaluation    Patient: Juan Pablo Saeed    Procedure(s) Performed: Procedure(s) (LRB):  COLONOSCOPY- possible banding (N/A)    Final Anesthesia Type: MAC      Patient location during evaluation: GI PACU  Patient participation: Yes- Able to Participate  Level of consciousness: awake and alert  Post-procedure vital signs: reviewed and stable  Pain management: adequate  Airway patency: patent    PONV status at discharge: No PONV  Anesthetic complications: no      Cardiovascular status: blood pressure returned to baseline, hemodynamically stable and stable  Respiratory status: unassisted, room air and spontaneous ventilation  Hydration status: euvolemic  Follow-up not needed.          Vitals Value Taken Time   /85 11/09/23 0754   Temp 36.4 °C (97.5 °F) 11/09/23 0734   Pulse 62 11/09/23 0754   Resp 20 11/09/23 0754   SpO2 99 % 11/09/23 0754         Event Time   Out of Recovery 08:10:55         Pain/Antonio Score: Antonio Score: 10 (11/9/2023  7:54 AM)

## 2023-11-09 NOTE — ANESTHESIA PREPROCEDURE EVALUATION
11/09/2023  Juan Pablo Saeed is a 43 y.o., male     Patient Active Problem List   Diagnosis    Essential hypertension    MOSHE (obstructive sleep apnea)    Psychophysiological insomnia    Sleep-related bruxism    Idiopathic peripheral neuropathy    Lateral epicondylitis of left elbow    Ganglion cyst of left foot    Proteinuria    Mixed hyperlipidemia    Stage 3a chronic kidney disease    Other hemorrhoids    GERD (gastroesophageal reflux disease)    Morbid obesity with BMI of 50.0-59.9, adult    Insulin resistance    Decreased  strength    Pain in both hands    Testicular failure   .      Pre-op Assessment    I have reviewed the Patient Summary Reports.     I have reviewed the Nursing Notes. I have reviewed the NPO Status.   I have reviewed the Medications.     Review of Systems  Pulmonary:   Sleep Apnea    Renal/:   Chronic Renal Disease    Hepatic/GI:   Bowel Prep. GERD    Psych:   Psychiatric History          Physical Exam  General: Well nourished, Cooperative, Alert and Oriented    Airway:  Mallampati: II   Mouth Opening: Normal  TM Distance: Normal  Tongue: Normal  Neck ROM: Normal ROM    Dental:  Intact        Anesthesia Plan  Type of Anesthesia, risks & benefits discussed:    Anesthesia Type: MAC, Gen ETT  Intra-op Monitoring Plan: Standard ASA Monitors  Post Op Pain Control Plan: multimodal analgesia  Induction:  IV  Informed Consent: Informed consent signed with the Patient and all parties understand the risks and agree with anesthesia plan.  All questions answered.   ASA Score: 3  Day of Surgery Review of History & Physical: H&P Update referred to the surgeon/provider.    Ready For Surgery From Anesthesia Perspective.     .

## 2023-11-09 NOTE — H&P
[facility] ([facility])  Colon and Rectal Surgery  History & Physical    Patient Name: Juan Pablo Saeed  MRN: [id number]  Admission Date: 11/9/2023  Attending Physician: Song Hollingsworth MD  Primary Care Provider: Jordin Hampton MD    Patient information was obtained from patient and medical records.    Subjective:     Chief Complaint/Reason for Admission: Here for Colonoscopy    History of Present Illness:  Patient is a 43 y.o. male presents for colonoscopy.has BRBPR and constipation. Sometimes bleeding occurs outside of BM. Has improved some simce managing constipation. Had prior colonoscopy at age 24    +FH for colon cancer in mother and grandfather.     No current facility-administered medications on file prior to encounter.     Current Outpatient Medications on File Prior to Encounter   Medication Sig    anastrozole (ARIMIDEX) 1 mg Tab Take 1 tablet (1 mg total) by mouth once daily.    atorvastatin (LIPITOR) 40 MG tablet Take 1 tablet (40 mg total) by mouth once daily.    clomiPHENE (CLOMID) 50 mg tablet Take 1/2 PO QD    gabapentin (NEURONTIN) 300 MG capsule Take 1 capsule (300 mg total) by mouth 2 (two) times daily.    lisinopriL (PRINIVIL,ZESTRIL) 40 MG tablet Take 1 tablet (40 mg total) by mouth once daily.    multivitamin with minerals tablet Take 1 tablet by mouth.    acetaminophen (TYLENOL) 325 MG tablet Take 325 mg by mouth every 6 (six) hours as needed for Pain.    cefdinir (OMNICEF) 300 MG capsule Take 300 mg by mouth 2 (two) times daily.    clobetasoL (TEMOVATE) 0.05 % cream Apply topically 2 (two) times daily. (Patient not taking: Reported on 10/19/2023)       Review of patient's allergies indicates:  No Known Allergies    Past Medical History:   Diagnosis Date    Back pain     GERD (gastroesophageal reflux disease)     HTN (hypertension)     Mixed hyperlipidemia     Obesity      Past Surgical History:   Procedure Laterality Date    CIRCUMCISION  2010     Family History       Problem  Relation (Age of Onset)    Alcohol abuse Brother    Arthritis Mother    Cancer Mother    Diabetes Brother    Stroke Maternal Grandmother          Tobacco Use    Smoking status: Former     Types: Cigars     Passive exposure: Never    Smokeless tobacco: Never   Substance and Sexual Activity    Alcohol use: Yes     Comment: occasional    Drug use: Never    Sexual activity: Yes     Partners: Female       Objective:     Vital Signs (Most Recent):  Temp: 97.2 °F (36.2 °C) (11/09/23 0555)  Pulse: 65 (11/09/23 0555)  Resp: 18 (11/09/23 0555)  BP: 122/75 (11/09/23 0606)  SpO2: 99 % (11/09/23 0555) Vital Signs (24h Range):  Temp:  [97.2 °F (36.2 °C)] 97.2 °F (36.2 °C)  Pulse:  [65] 65  Resp:  [18] 18  SpO2:  [99 %] 99 %  BP: (122)/(75) 122/75     Weight: (!) 164.7 kg (363 lb)  Body mass index is 49.23 kg/m².    Physical Exam  Constitutional:       Appearance: He is well-developed.   HENT:      Head: Normocephalic and atraumatic.   Eyes:      Conjunctiva/sclera: Conjunctivae normal.      Pupils: Pupils are equal, round, and reactive to light.   Neck:      Thyroid: No thyromegaly.   Cardiovascular:      Rate and Rhythm: Normal rate and regular rhythm.   Pulmonary:      Effort: Pulmonary effort is normal. No respiratory distress.   Abdominal:      General: There is no distension.      Palpations: Abdomen is soft. There is no mass.      Tenderness: There is no abdominal tenderness.   Musculoskeletal:         General: No tenderness. Normal range of motion.      Cervical back: Normal range of motion.   Skin:     General: Skin is warm and dry.      Capillary Refill: Capillary refill takes less than 2 seconds.   Neurological:      General: No focal deficit present.      Mental Status: He is alert and oriented to person, place, and time.           Assessment/Plan:     Patient is a 43 y.o. male who presents for colonoscopy     - Ok to proceed to endoscopy suite for colonoscopy  - Consent obtained. All risks, benefits and alternatives  fully explained to patient, including but not limited to bleeding, infection, perforation, and missed polyps. All questions appropriately answered to patient's satisfaction. Consent signed and placed on chart.    There are no hospital problems to display for this patient.    VTE Risk Mitigation (From admission, onward)      None            Ce Herrera MD  Colon and Rectal Surgery  [facility] ([facility])

## 2023-11-09 NOTE — PLAN OF CARE
DR MCNAMARA AT BEDSIDE TO SPEAK TO PT. REGARDING RESULTS.  VSS, NO GI BLEEDING, NO ABD. PAIN, NO N/V. PT. DISCHARGED FROM UNIT.

## 2023-11-09 NOTE — BRIEF OP NOTE
[facility])  Brief Operative Note     SUMMARY     Surgery Date: 11/9/2023     Surgeon(s) and Role:     * Ce Herrera MD - Primary    Assisting Surgeon: None    Pre-op Diagnosis:  Blood in stool [K92.1]  Family history of colon cancer [Z80.0]    Post-op Diagnosis:  Post-Op Diagnosis Codes:     * Blood in stool [K92.1]     * Family history of colon cancer [Z80.0]    Procedure(s) (LRB):  COLONOSCOPY- possible banding (N/A)    Anesthesia: Choice    Description of the findings of the procedure: See Op Note    Findings/Key Components: G3 internal hemorrhoids    Estimated Blood Loss: * No values recorded between 11/9/2023  6:52 AM and 11/9/2023  7:20 AM *         Specimens:   Specimen (24h ago, onward)      None            Discharge Note    SUMMARY     Admit Date: 11/9/2023    Discharge Date and Time: 11/9/2023 7:25 AM    Hospital Course Patient was seen in the preoperative area by both myself and anesthesia. All consents were verified and all questions appropriately answered. All risks, benefits and alternatives explained to patient. Patient proceeded to endoscopy suite for colonoscopy and was discharged home postoperative once cleared by anesthesia.    Final Diagnosis: Post-Op Diagnosis Codes:     * Blood in stool [K92.1]     * Family history of colon cancer [Z80.0]    Disposition: Home or Self Care    Follow Up/Patient Instructions: See Provation report    Medications:  Reconciled Home Medications:      Medication List        CONTINUE taking these medications      acetaminophen 325 MG tablet  Commonly known as: TYLENOL  Take 325 mg by mouth every 6 (six) hours as needed for Pain.     anastrozole 1 mg Tab  Commonly known as: ARIMIDEX  Take 1 tablet (1 mg total) by mouth once daily.     atorvastatin 40 MG tablet  Commonly known as: LIPITOR  Take 1 tablet (40 mg total) by mouth once daily.     clomiPHENE 50 mg tablet  Commonly known as: CLOMID  Take 1/2 PO QD     gabapentin 300 MG  capsule  Commonly known as: NEURONTIN  Take 1 capsule (300 mg total) by mouth 2 (two) times daily.     lisinopriL 40 MG tablet  Commonly known as: PRINIVIL,ZESTRIL  Take 1 tablet (40 mg total) by mouth once daily.     multivitamin with minerals tablet  Take 1 tablet by mouth.            ASK your doctor about these medications      cefdinir 300 MG capsule  Commonly known as: OMNICEF  Take 300 mg by mouth 2 (two) times daily.     clobetasoL 0.05 % cream  Commonly known as: TEMOVATE  Apply topically 2 (two) times daily.            Discharge Procedure Orders   Diet general      Follow-up Information       Fay Sheldon PA-C Follow up in 4 week(s).    Specialty: Colon and Rectal Surgery  Contact information:  [address] [place] [address]   [address] [address] [phone]

## 2023-11-09 NOTE — TRANSFER OF CARE
Anesthesia Transfer of Care Note    Patient: Juan Pablo Saeed    Procedure(s) Performed: Procedure(s) (LRB):  COLONOSCOPY- possible banding (N/A)    Patient location: GI    Anesthesia Type: MAC    Transport from OR: Transported from OR on room air with adequate spontaneous ventilation    Post pain: adequate analgesia    Post assessment: no apparent anesthetic complications and tolerated procedure well    Post vital signs: stable    Level of consciousness: responds to stimulation    Nausea/Vomiting: no nausea/vomiting    Complications: none    Transfer of care protocol was followed      Last vitals:   Visit Vitals  /75   Pulse 65   Temp 36.2 °C (97.2 °F)   Resp 18   Ht 6' (1.829 m)   Wt (!) 164.7 kg (363 lb)   SpO2 99%   BMI 49.23 kg/m²

## 2023-11-10 VITALS
WEIGHT: 315 LBS | SYSTOLIC BLOOD PRESSURE: 124 MMHG | TEMPERATURE: 98 F | RESPIRATION RATE: 20 BRPM | HEIGHT: 72 IN | OXYGEN SATURATION: 99 % | HEART RATE: 62 BPM | DIASTOLIC BLOOD PRESSURE: 85 MMHG | BODY MASS INDEX: 42.66 KG/M2

## 2023-12-06 ENCOUNTER — OFFICE VISIT (OUTPATIENT)
Dept: SURGERY | Facility: CLINIC | Age: 43
End: 2023-12-06
Payer: COMMERCIAL

## 2023-12-06 VITALS
HEART RATE: 80 BPM | DIASTOLIC BLOOD PRESSURE: 81 MMHG | BODY MASS INDEX: 42.66 KG/M2 | SYSTOLIC BLOOD PRESSURE: 174 MMHG | WEIGHT: 315 LBS | HEIGHT: 72 IN | TEMPERATURE: 99 F

## 2023-12-06 DIAGNOSIS — K64.8 INTERNAL HEMORRHOIDS: Primary | ICD-10-CM

## 2023-12-06 DIAGNOSIS — Z80.0 FAMILY HISTORY OF COLON CANCER: ICD-10-CM

## 2023-12-06 PROCEDURE — 1159F PR MEDICATION LIST DOCUMENTED IN MEDICAL RECORD: ICD-10-PCS | Mod: CPTII,S$GLB,,

## 2023-12-06 PROCEDURE — 4010F PR ACE/ARB THEARPY RXD/TAKEN: ICD-10-PCS | Mod: CPTII,S$GLB,,

## 2023-12-06 PROCEDURE — 1159F MED LIST DOCD IN RCRD: CPT | Mod: CPTII,S$GLB,,

## 2023-12-06 PROCEDURE — 3044F HG A1C LEVEL LT 7.0%: CPT | Mod: CPTII,S$GLB,,

## 2023-12-06 PROCEDURE — 3077F PR MOST RECENT SYSTOLIC BLOOD PRESSURE >= 140 MM HG: ICD-10-PCS | Mod: CPTII,S$GLB,,

## 2023-12-06 PROCEDURE — 99213 PR OFFICE/OUTPT VISIT, EST, LEVL III, 20-29 MIN: ICD-10-PCS | Mod: S$GLB,,,

## 2023-12-06 PROCEDURE — 3044F PR MOST RECENT HEMOGLOBIN A1C LEVEL <7.0%: ICD-10-PCS | Mod: CPTII,S$GLB,,

## 2023-12-06 PROCEDURE — 3077F SYST BP >= 140 MM HG: CPT | Mod: CPTII,S$GLB,,

## 2023-12-06 PROCEDURE — 3079F PR MOST RECENT DIASTOLIC BLOOD PRESSURE 80-89 MM HG: ICD-10-PCS | Mod: CPTII,S$GLB,,

## 2023-12-06 PROCEDURE — 4010F ACE/ARB THERAPY RXD/TAKEN: CPT | Mod: CPTII,S$GLB,,

## 2023-12-06 PROCEDURE — 99999 PR PBB SHADOW E&M-EST. PATIENT-LVL III: ICD-10-PCS | Mod: PBBFAC,,,

## 2023-12-06 PROCEDURE — 3079F DIAST BP 80-89 MM HG: CPT | Mod: CPTII,S$GLB,,

## 2023-12-06 PROCEDURE — 99213 OFFICE O/P EST LOW 20 MIN: CPT | Mod: S$GLB,,,

## 2023-12-06 PROCEDURE — 3008F BODY MASS INDEX DOCD: CPT | Mod: CPTII,S$GLB,,

## 2023-12-06 PROCEDURE — 3008F PR BODY MASS INDEX (BMI) DOCUMENTED: ICD-10-PCS | Mod: CPTII,S$GLB,,

## 2023-12-06 PROCEDURE — 99999 PR PBB SHADOW E&M-EST. PATIENT-LVL III: CPT | Mod: PBBFAC,,,

## 2023-12-06 NOTE — PROGRESS NOTES
History & Physical    SUBJECTIVE:     CC: rectal bleeding   Ref: Mary Savage NP     History of Present Illness:  Patient is a 43 y.o. male presents with painless rectal bleeding, rectal mucus, hematochezia.  Patient states that he has had painless rectal bleeding for the last several years; however, it is now more frequent.  Patient reports mucous per rectum that leaks that started earlier this year.  He does report occasional hematochezia.  Patient states that the rectal bleeding drips in the toilet with bowel movements; also has blood dripping without a bowel movement occasionally at night or on the floor with urinating.  Mucous described as clear/yellow that also leaks without bowel movements occasionally.  Patient denies any rectal pain.  Reports worsened constipation lately.  Patient reports daily bowel movement, does not take any fiber, stool softeners, laxatives, does have to strain and has hard stools frequently, drinks more than 64 oz of water daily, sits on the toilet for more than 5 minutes at a time.  Patient states that he has left arm prescription steroid suppositories for hemorrhoids that he uses p.r.n. which have not really helped with any of his symptoms lately.  Family history includes maternal grandfather with colon cancer and mother with colon cancer - patient unsure of mother's age of diagnosis as he states that she has passed away, he remembers her having multiple colon surgeries and was told that she had some sort of intestinal cancer.  Patient not on anticoagulation.  Patient states that he previously had a colonoscopy age 24 due to fam hx with no abnormalities. Denies nausea, vomiting, fevers, chills., unintentional weight changes.    Interval history:   Since the last clinic visit, the patient underwent a colonoscopy where possible hemorrhoid banding was performed. No polyps or lesions were found. Since the hemorrhoid banding, he has had a resolution of the rectal  bleeding/mucus/hematochezia and prolapsing hemorrhoids. He states that he has started fiber supplementation, sitting less time on toilet, and drinking more water. Having soft, easy to pass bowel movements. Tolerating a regular diet. Denies nausea, vomiting, fevers, chills.     Chief Complaint   Patient presents with    Post-op Evaluation       Review of patient's allergies indicates:  No Known Allergies    Current Outpatient Medications   Medication Sig Dispense Refill    acetaminophen (TYLENOL) 325 MG tablet Take 325 mg by mouth every 6 (six) hours as needed for Pain.      anastrozole (ARIMIDEX) 1 mg Tab Take 1 tablet (1 mg total) by mouth once daily. 30 tablet 5    atorvastatin (LIPITOR) 40 MG tablet Take 1 tablet (40 mg total) by mouth once daily. 90 tablet 3    cefdinir (OMNICEF) 300 MG capsule Take 300 mg by mouth 2 (two) times daily.      clobetasoL (TEMOVATE) 0.05 % cream Apply topically 2 (two) times daily. 30 g 2    clomiPHENE (CLOMID) 50 mg tablet Take 1/2 PO QD 15 tablet 5    gabapentin (NEURONTIN) 300 MG capsule Take 1 capsule (300 mg total) by mouth 2 (two) times daily. 120 capsule 3    lisinopriL (PRINIVIL,ZESTRIL) 40 MG tablet Take 1 tablet (40 mg total) by mouth once daily. 90 tablet 3    multivitamin with minerals tablet Take 1 tablet by mouth.       No current facility-administered medications for this visit.       Past Medical History:   Diagnosis Date    Back pain     GERD (gastroesophageal reflux disease)     HTN (hypertension)     Mixed hyperlipidemia     Obesity      Past Surgical History:   Procedure Laterality Date    CIRCUMCISION  2010    COLONOSCOPY N/A 11/9/2023    Procedure: COLONOSCOPY- possible banding;  Surgeon: Ce Herrera MD;  Location: [facility];  Service: Colon and Rectal;  Laterality: N/A;     Family History   Problem Relation Age of Onset    Arthritis Mother     Cancer Mother         stomach    Diabetes Brother     Alcohol abuse Brother     Stroke Maternal Grandmother       Social History     Tobacco Use    Smoking status: Former     Types: Cigars     Passive exposure: Never    Smokeless tobacco: Never   Substance Use Topics    Alcohol use: Yes     Comment: occasional    Drug use: Never        Review of Systems:  Review of Systems   Constitutional:  Negative for activity change, appetite change, chills, fatigue, fever and unexpected weight change.   HENT:  Negative for congestion.    Eyes:  Negative for visual disturbance.   Respiratory:  Negative for shortness of breath.    Cardiovascular:  Negative for chest pain.   Gastrointestinal:  Negative for abdominal distention, abdominal pain, anal bleeding, blood in stool, constipation, diarrhea, nausea, rectal pain and vomiting.   Genitourinary:  Negative for dysuria.   Musculoskeletal:  Negative for arthralgias.   Skin:  Negative for rash.   Neurological:  Negative for light-headedness.   Hematological:  Negative for adenopathy.       OBJECTIVE:     Vital Signs (Most Recent)  Temp: 98.5 °F (36.9 °C) (12/06/23 0945)  Pulse: 80 (12/06/23 0945)  BP: (!) 174/81 (12/06/23 0945)  6' (1.829 m)  (!) 167.8 kg (370 lb)     Physical Exam:  Physical Exam  Vitals and nursing note reviewed.   Constitutional:       General: He is not in acute distress.     Appearance: Normal appearance. He is well-developed. He is not ill-appearing or toxic-appearing.   HENT:      Head: Normocephalic and atraumatic.      Right Ear: External ear normal.      Left Ear: External ear normal.      Nose: Nose normal.   Cardiovascular:      Rate and Rhythm: Normal rate.   Pulmonary:      Effort: Pulmonary effort is normal. No respiratory distress.   Abdominal:      General: There is no distension.      Palpations: Abdomen is soft.      Tenderness: There is no abdominal tenderness.   Genitourinary:     Comments: Anorectal: offered but deferred  Musculoskeletal:         General: Normal range of motion.      Cervical back: Normal range of motion and neck supple.   Skin:      General: Skin is warm and dry.   Neurological:      Mental Status: He is alert and oriented to person, place, and time.   Psychiatric:         Mood and Affect: Mood normal.         Behavior: Behavior normal.       Laboratory  CBC: Reviewed  CMP: Reviewed    Diagnostics:   Colonoscopy 11/2023: reviewed     ASSESSMENT/PLAN:     43 y.o. male with hemorrhoids now s/p hemorrhoid banding during colonoscopy who is doing well    -Colonoscopy reviewed. No polyps or lesions. Hemorrhoids banded at time of procedure. No complaints today - bleeding/mucus/prolapsing tissue has resolved since the banding.   -Discussed causes and treatment of hemorrhoidal disease including improvement in bowel habits, banding, and excisional hemorrhoidectomy.   -To prevent hemorrhoid recurrence in the future, discussed that a minimum I would recommend behavioral, lifestyle and medication modifications to improve bowel habits. Usual bowel management handout given to patient. This includes a stool softener twice per day, fiber powder supplementation daily, drinking at least 64oz of water/day, avoiding straining with bowel movements, spending less than 5 min on toilet per bowel movement, eating a high fiber diet, using miralax as needed to achieve a bowel movement daily and using wet wipes to wipe after bowel movements when irritated.   -rtc prn    Fay Sheldon PA-C  Colon and Rectal Surgery  [facility]

## 2023-12-07 ENCOUNTER — TELEPHONE (OUTPATIENT)
Dept: UROLOGY | Facility: CLINIC | Age: 43
End: 2023-12-07
Payer: COMMERCIAL

## 2023-12-07 NOTE — TELEPHONE ENCOUNTER
Call was placed to patient to see if he has completed his SA he stated he has not and will call back once he complete it.He was informed that appointment has been cancelled until its completed Fertility Answer Number provided.

## 2024-01-03 ENCOUNTER — OFFICE VISIT (OUTPATIENT)
Dept: SURGERY | Facility: CLINIC | Age: 44
End: 2024-01-03
Payer: COMMERCIAL

## 2024-01-03 VITALS
HEART RATE: 83 BPM | DIASTOLIC BLOOD PRESSURE: 76 MMHG | RESPIRATION RATE: 19 BRPM | OXYGEN SATURATION: 98 % | BODY MASS INDEX: 50.18 KG/M2 | WEIGHT: 315 LBS | SYSTOLIC BLOOD PRESSURE: 115 MMHG

## 2024-01-03 DIAGNOSIS — K64.8 INTERNAL HEMORRHOIDS: Primary | ICD-10-CM

## 2024-01-03 DIAGNOSIS — M62.89 PELVIC FLOOR DYSFUNCTION: ICD-10-CM

## 2024-01-03 DIAGNOSIS — K59.00 CONSTIPATION, UNSPECIFIED CONSTIPATION TYPE: ICD-10-CM

## 2024-01-03 PROCEDURE — 3078F DIAST BP <80 MM HG: CPT | Mod: CPTII,S$GLB,, | Performed by: STUDENT IN AN ORGANIZED HEALTH CARE EDUCATION/TRAINING PROGRAM

## 2024-01-03 PROCEDURE — 3074F SYST BP LT 130 MM HG: CPT | Mod: CPTII,S$GLB,, | Performed by: STUDENT IN AN ORGANIZED HEALTH CARE EDUCATION/TRAINING PROGRAM

## 2024-01-03 PROCEDURE — 3008F BODY MASS INDEX DOCD: CPT | Mod: CPTII,S$GLB,, | Performed by: STUDENT IN AN ORGANIZED HEALTH CARE EDUCATION/TRAINING PROGRAM

## 2024-01-03 PROCEDURE — 1159F MED LIST DOCD IN RCRD: CPT | Mod: CPTII,S$GLB,, | Performed by: STUDENT IN AN ORGANIZED HEALTH CARE EDUCATION/TRAINING PROGRAM

## 2024-01-03 PROCEDURE — 99999 PR PBB SHADOW E&M-EST. PATIENT-LVL III: CPT | Mod: PBBFAC,,, | Performed by: STUDENT IN AN ORGANIZED HEALTH CARE EDUCATION/TRAINING PROGRAM

## 2024-01-03 PROCEDURE — 99213 OFFICE O/P EST LOW 20 MIN: CPT | Mod: S$GLB,,, | Performed by: STUDENT IN AN ORGANIZED HEALTH CARE EDUCATION/TRAINING PROGRAM

## 2024-01-03 NOTE — PROGRESS NOTES
Subjective:       Patient ID: Juan Pablo Saeed is a 43 y.o. male.    Chief Complaint: No chief complaint on file.    Patient is a 43-year-old male who presents after colonoscopy with 2 column hemorrhoidal banding for rectal bleeding secondary to grade 3 internal hemorrhoids.  He reports that initially he felt a pinch associated with increased rectal bleeding however that has improved and he has not had any bleeding for the past 2 days.  He does endorse continued straining however he has improved with his constipation.  Occasionally he notices some hard stools.  He is using fiber but has not introduced stool softener or laxative.  He continues to shower and cleanse with a shower head after bowel movements.        Colonoscopy: G3 internal hemorrhoids, otherwise normal    Pathology: none        Objective:      Physical Exam  Constitutional:       Appearance: He is well-developed.   HENT:      Head: Normocephalic and atraumatic.   Eyes:      Conjunctiva/sclera: Conjunctivae normal.      Pupils: Pupils are equal, round, and reactive to light.   Neck:      Thyroid: No thyromegaly.   Cardiovascular:      Rate and Rhythm: Normal rate and regular rhythm.   Pulmonary:      Effort: Pulmonary effort is normal. No respiratory distress.   Abdominal:      General: There is no distension.      Palpations: Abdomen is soft. There is no mass.      Tenderness: There is no abdominal tenderness.   Musculoskeletal:         General: No tenderness. Normal range of motion.      Cervical back: Normal range of motion.   Skin:     General: Skin is warm and dry.      Capillary Refill: Capillary refill takes less than 2 seconds.   Neurological:      General: No focal deficit present.      Mental Status: He is alert and oriented to person, place, and time.         Assessment:       1. Internal hemorrhoids    2. Constipation, unspecified constipation type    3. Pelvic floor dysfunction        Plan:       - given improvement in bleeding would not  simone at this time  - reviewed bowel management. Encouraged behavioral modification. Usual bowel management handout given again. Would add a stool softener twice per day, fiber powder supplementation daily, drinking at least 64oz of water/day, avoiding straining with bowel movements, spending less than 5 min on toilet per bowel movement, eating a high fiber diet, using miralax as needed to achieve a bowel movement daily and using wet wipes to wipe after bowel movements when irritated.   - he likely has mild pelvic floor dysfunction with obstructive defecation. He can trial colace in addition to fiber supplement and see if straining improves. If it doesn't and he continues to bleed will need anorectal manometry and likely PFPT          Ce Herrera MD  Colon and Rectal Surgery  [facility]

## 2024-02-09 ENCOUNTER — OFFICE VISIT (OUTPATIENT)
Dept: CARDIOLOGY | Facility: CLINIC | Age: 44
End: 2024-02-09
Payer: COMMERCIAL

## 2024-02-09 ENCOUNTER — HOSPITAL ENCOUNTER (OUTPATIENT)
Dept: RADIOLOGY | Facility: HOSPITAL | Age: 44
Discharge: HOME | End: 2024-02-09
Attending: NURSE PRACTITIONER
Payer: COMMERCIAL

## 2024-02-09 ENCOUNTER — OFFICE VISIT (OUTPATIENT)
Dept: INTERNAL MEDICINE | Facility: CLINIC | Age: 44
End: 2024-02-09
Payer: COMMERCIAL

## 2024-02-09 ENCOUNTER — HOSPITAL ENCOUNTER (OUTPATIENT)
Dept: CARDIOLOGY | Facility: HOSPITAL | Age: 44
Discharge: HOME | End: 2024-02-09
Attending: STUDENT IN AN ORGANIZED HEALTH CARE EDUCATION/TRAINING PROGRAM
Payer: COMMERCIAL

## 2024-02-09 VITALS
WEIGHT: 315 LBS | TEMPERATURE: 97 F | OXYGEN SATURATION: 98 % | BODY MASS INDEX: 48.38 KG/M2 | HEART RATE: 85 BPM | SYSTOLIC BLOOD PRESSURE: 110 MMHG | DIASTOLIC BLOOD PRESSURE: 82 MMHG

## 2024-02-09 VITALS
BODY MASS INDEX: 42.66 KG/M2 | OXYGEN SATURATION: 97 % | HEART RATE: 80 BPM | DIASTOLIC BLOOD PRESSURE: 78 MMHG | SYSTOLIC BLOOD PRESSURE: 120 MMHG | WEIGHT: 315 LBS | HEIGHT: 72 IN

## 2024-02-09 DIAGNOSIS — I10 ESSENTIAL HYPERTENSION: ICD-10-CM

## 2024-02-09 DIAGNOSIS — R07.9 CHEST PAIN, UNSPECIFIED TYPE: Primary | ICD-10-CM

## 2024-02-09 DIAGNOSIS — R07.9 CHEST PAIN, UNSPECIFIED TYPE: ICD-10-CM

## 2024-02-09 DIAGNOSIS — K21.9 GASTROESOPHAGEAL REFLUX DISEASE WITHOUT ESOPHAGITIS: ICD-10-CM

## 2024-02-09 DIAGNOSIS — E78.2 MIXED HYPERLIPIDEMIA: Primary | ICD-10-CM

## 2024-02-09 DIAGNOSIS — I10 ESSENTIAL HYPERTENSION: Primary | ICD-10-CM

## 2024-02-09 DIAGNOSIS — M79.671 PAIN IN RIGHT FOOT: ICD-10-CM

## 2024-02-09 DIAGNOSIS — E78.2 MIXED HYPERLIPIDEMIA: ICD-10-CM

## 2024-02-09 DIAGNOSIS — G47.33 OSA (OBSTRUCTIVE SLEEP APNEA): ICD-10-CM

## 2024-02-09 DIAGNOSIS — N18.31 STAGE 3A CHRONIC KIDNEY DISEASE: ICD-10-CM

## 2024-02-09 PROBLEM — E66.01 MORBID OBESITY WITH BMI OF 50.0-59.9, ADULT: Status: RESOLVED | Noted: 2019-12-11 | Resolved: 2024-02-09

## 2024-02-09 PROCEDURE — 1159F MED LIST DOCD IN RCRD: CPT | Mod: CPTII,S$GLB,, | Performed by: NURSE PRACTITIONER

## 2024-02-09 PROCEDURE — 99999 PR PBB SHADOW E&M-EST. PATIENT-LVL IV: CPT | Mod: PBBFAC,,, | Performed by: NURSE PRACTITIONER

## 2024-02-09 PROCEDURE — 3078F DIAST BP <80 MM HG: CPT | Mod: CPTII,S$GLB,, | Performed by: STUDENT IN AN ORGANIZED HEALTH CARE EDUCATION/TRAINING PROGRAM

## 2024-02-09 PROCEDURE — 3008F BODY MASS INDEX DOCD: CPT | Mod: CPTII,S$GLB,, | Performed by: STUDENT IN AN ORGANIZED HEALTH CARE EDUCATION/TRAINING PROGRAM

## 2024-02-09 PROCEDURE — 93005 ELECTROCARDIOGRAM TRACING: CPT | Mod: PO

## 2024-02-09 PROCEDURE — 99999 PR PBB SHADOW E&M-EST. PATIENT-LVL III: CPT | Mod: PBBFAC,,, | Performed by: STUDENT IN AN ORGANIZED HEALTH CARE EDUCATION/TRAINING PROGRAM

## 2024-02-09 PROCEDURE — 3008F BODY MASS INDEX DOCD: CPT | Mod: CPTII,S$GLB,, | Performed by: NURSE PRACTITIONER

## 2024-02-09 PROCEDURE — 3074F SYST BP LT 130 MM HG: CPT | Mod: CPTII,S$GLB,, | Performed by: STUDENT IN AN ORGANIZED HEALTH CARE EDUCATION/TRAINING PROGRAM

## 2024-02-09 PROCEDURE — 3074F SYST BP LT 130 MM HG: CPT | Mod: CPTII,S$GLB,, | Performed by: NURSE PRACTITIONER

## 2024-02-09 PROCEDURE — 73630 X-RAY EXAM OF FOOT: CPT | Mod: TC,FY,PO,RT

## 2024-02-09 PROCEDURE — 73630 X-RAY EXAM OF FOOT: CPT | Mod: 26,RT,, | Performed by: RADIOLOGY

## 2024-02-09 PROCEDURE — 3079F DIAST BP 80-89 MM HG: CPT | Mod: CPTII,S$GLB,, | Performed by: NURSE PRACTITIONER

## 2024-02-09 PROCEDURE — 99214 OFFICE O/P EST MOD 30 MIN: CPT | Mod: S$GLB,,, | Performed by: NURSE PRACTITIONER

## 2024-02-09 PROCEDURE — 99204 OFFICE O/P NEW MOD 45 MIN: CPT | Mod: S$GLB,,, | Performed by: STUDENT IN AN ORGANIZED HEALTH CARE EDUCATION/TRAINING PROGRAM

## 2024-02-09 PROCEDURE — 1160F RVW MEDS BY RX/DR IN RCRD: CPT | Mod: CPTII,S$GLB,, | Performed by: NURSE PRACTITIONER

## 2024-02-09 PROCEDURE — 1159F MED LIST DOCD IN RCRD: CPT | Mod: CPTII,S$GLB,, | Performed by: STUDENT IN AN ORGANIZED HEALTH CARE EDUCATION/TRAINING PROGRAM

## 2024-02-09 PROCEDURE — 93010 ELECTROCARDIOGRAM REPORT: CPT | Mod: ,,, | Performed by: STUDENT IN AN ORGANIZED HEALTH CARE EDUCATION/TRAINING PROGRAM

## 2024-02-09 NOTE — PROGRESS NOTES
Section of Cardiology                  Cardiac Clinic Note    Chief Complaint/Reason for consultation: chest pain      HPI:   Juan Pablo Saeed is a 43 y.o. male with h/o obesity, HLD, HTN, CKD, MOSHE, prediabetes who was referred to cardiology clinic by LYLA Savage for evaluation.    2/9/24  Chest pain has been on and off for many years- no associated symptoms   Has become more often in the last month  Described as a chest pressure  Rubbing the area resolves it  Comes on at work, very stressful, thinks it's stress related  He is an  at work- started job 7 months     Sodas is rare- maybe 1 day  No coffee or energy drinks   Just started dieting in January   370 lbs ->356 lbs now   Wears cpap nightly     Denies tobacco abuse  ETOH rare    Family history: dad- heart disease (?cardiac arrest) in early 60s, ETOH use         EKG 2/9/24 NSR, no acute ST - T wave changes    ECHO  No results found for this or any previous visit.       STRESS TEST No results found for this or any previous visit.       LHC No results found for this or any previous visit.            ROS: All 10 systems reviewed. Please refer to the HPI for pertinent positives. All other systems negative.     Past Medical History  Past Medical History:   Diagnosis Date    Back pain     GERD (gastroesophageal reflux disease)     HTN (hypertension)     Mixed hyperlipidemia     Obesity        Surgical History  Past Surgical History:   Procedure Laterality Date    CIRCUMCISION  2010    COLONOSCOPY N/A 11/9/2023    Procedure: COLONOSCOPY- possible banding;  Surgeon: Ce Herrera MD;  Location: [facility];  Service: Colon and Rectal;  Laterality: N/A;          Allergies:   Review of patient's allergies indicates:  No Known Allergies    Social History:  Social History     Socioeconomic History    Marital status:     Number of children: 0   Occupational History    Occupation: [employer]   Tobacco Use    Smoking status: Former     Types:  Cigars     Passive exposure: Never    Smokeless tobacco: Never   Substance and Sexual Activity    Alcohol use: Yes     Comment: occasional    Drug use: Never    Sexual activity: Yes     Partners: Female     Social Determinants of Health     Financial Resource Strain: Low Risk  (4/8/2020)    Overall Financial Resource Strain (CARDIA)     Difficulty of Paying Living Expenses: Not very hard   Food Insecurity: No Food Insecurity (4/8/2020)    Hunger Vital Sign     Worried About Running Out of Food in the Last Year: Never true     Ran Out of Food in the Last Year: Never true   Transportation Needs: No Transportation Needs (4/8/2020)    PRAPARE - Transportation     Lack of Transportation (Medical): No     Lack of Transportation (Non-Medical): No   Physical Activity: Insufficiently Active (4/8/2020)    Exercise Vital Sign     Days of Exercise per Week: 2 days     Minutes of Exercise per Session: 30 min   Stress: No Stress Concern Present (4/8/2020)    [employer] - Occupational Stress Questionnaire     Feeling of Stress : Only a little   Social Connections: Unknown (4/8/2020)    Social Connection and Isolation Panel [NHANES]     Frequency of Communication with Friends and Family: More than three times a week     Frequency of Social Gatherings with Friends and Family: Patient declined     Active Member of Clubs or Organizations: Yes     Attends Club or Organization Meetings: More than 4 times per year     Marital Status:        Family History:  family history includes Alcohol abuse in his brother; Arthritis in his mother; Cancer in his mother; Diabetes in his brother; Stroke in his maternal grandmother.    Home Medications:  Current Outpatient Medications on File Prior to Visit   Medication Sig Dispense Refill    acetaminophen (TYLENOL) 325 MG tablet Take 325 mg by mouth every 6 (six) hours as needed for Pain.      atorvastatin (LIPITOR) 40 MG tablet Take 1 tablet (40 mg total) by mouth  once daily. 90 tablet 3    clomiPHENE (CLOMID) 50 mg tablet Take 1/2 PO QD 15 tablet 5    gabapentin (NEURONTIN) 300 MG capsule Take 1 capsule (300 mg total) by mouth 2 (two) times daily. 120 capsule 3    lisinopriL (PRINIVIL,ZESTRIL) 40 MG tablet Take 1 tablet (40 mg total) by mouth once daily. 90 tablet 3    multivitamin with minerals tablet Take 1 tablet by mouth.      [DISCONTINUED] anastrozole (ARIMIDEX) 1 mg Tab Take 1 tablet (1 mg total) by mouth once daily. (Patient not taking: Reported on 2/9/2024.) 30 tablet 5    [DISCONTINUED] cefdinir (OMNICEF) 300 MG capsule Take 300 mg by mouth 2 (two) times daily.      [DISCONTINUED] clobetasoL (TEMOVATE) 0.05 % cream Apply topically 2 (two) times daily. (Patient not taking: Reported on 2/9/2024) 30 g 2     No current facility-administered medications on file prior to visit.       Physical exam:  /78 (BP Location: Right arm, Patient Position: Sitting)   Pulse 80   Ht 6' (1.829 m)   Wt (!) 161.9 kg (356 lb 14.8 oz)   SpO2 97%   BMI 48.41 kg/m²         General: Pt is a 43 y.o. year old male who is AAOx3, in NAD, is pleasant, well nourished, looks stated age  HEENT: PERRL, EOMI, Oral mucosa pink & moist  CVS  No abnormal cardiac pulsations noted on inspection. JVP not raised. The apical impulse is normal on palpation, and is located in the left 5th intercostal space in the mid - clavicular line. No palpable thrills or abnormal pulsations noted. RR, S1 - S2 heard, no murmurs, rubs or gallops appreciated.   PUL : CTA B/L. No wheezes/crackles heard   ABD : BS +, soft. No tenderness elicited   LE : No C/C/E. Distal Pulses palpable B/L         LABS:    Chemistry:   Lab Results   Component Value Date     08/08/2023    K 4.4 08/08/2023     08/08/2023    CO2 23 08/08/2023    BUN 16 08/08/2023    CREATININE 1.6 (H) 08/08/2023    CALCIUM 9.8 08/08/2023     Cardiac Markers:   Lab Results   Component Value Date    TROPONINI <0.01 10/11/2023     Cardiac Markers  "(Last 3):   Lab Results   Component Value Date    TROPONINI <0.01 10/11/2023     CBC:   Lab Results   Component Value Date    WBC 6.23 08/08/2023    HGB 13.8 (L) 08/08/2023    HCT 43.0 08/08/2023    MCV 83 08/08/2023     08/08/2023     Lipids:   Lab Results   Component Value Date    CHOL 216 (H) 08/08/2023    TRIG 150 08/08/2023    HDL 35 (L) 08/08/2023     Coagulation: No results found for: "PT", "INR", "APTT"        Assessment        1. Mixed hyperlipidemia    2. Chest pain, unspecified type    3. Essential hypertension    4. Stage 3a chronic kidney disease    5. Gastroesophageal reflux disease without esophagitis    6. MOSHE (obstructive sleep apnea)         Plan:    Chest pain  Not cardiac in etiology  Most likely stress related    HTN  Stable  Continue lisinopril    CKD  Stable    GERD  Stable    MOSHE  Stable on CPAP    Obesity, Body mass index is 48.41 kg/m².   Low salt, low fat diet  Exercise as tolerated, at least 30 min daily     This note was prepared using voice recognition system and is likely to have sound alike errors that may have been overlooked even after proofreading.     I have reviewed all pertinent chart information.  Plans and recommendations have been formulated under my direct supervision. All questions answered and patient voiced understanding.   If symptoms persist go to the ED.    RTC prn        Rachel Orozco MD  Cardiology          "

## 2024-02-09 NOTE — PROGRESS NOTES
"Subjective:       Patient ID: Juan Pablo Saeed is a 43 y.o. male.    Chief Complaint: follow up  (Chest pain started about 2 months ago, pain in hands and feet )    Pt presents for chest pain  Started about 2 months ago  He has HTN, obesity, hyperlipidemia.  Nonsmoker.  He has atypical cp, both sides of chest on lateral side, not in middle.  CP occurs maybe every few months  He can "dull pain" if he massages it.  He thinks it could be stress related.    BP controlled.  Has lost about 15 lbs since his last appt  Has seen cardio in the past- negative workup    He also complains of foot pain  Known to have neuropathy  Taking gabapentin without relief           /82   Pulse 85   Temp 96.5 °F (35.8 °C) (Tympanic)   Wt (!) 161.8 kg (356 lb 11.3 oz)   SpO2 98%   BMI 48.38 kg/m²     Review of Systems   Constitutional:  Negative for activity change, appetite change, chills, diaphoresis, fatigue, fever and unexpected weight change.   HENT: Negative.     Eyes: Negative.    Respiratory:  Positive for chest tightness. Negative for apnea, shortness of breath and stridor.    Cardiovascular:  Positive for chest pain. Negative for palpitations and leg swelling.   Gastrointestinal: Negative.    Endocrine: Negative.    Genitourinary: Negative.    Musculoskeletal:  Negative for arthralgias and myalgias.   Skin:  Negative for color change, pallor, rash and wound.   Allergic/Immunologic: Negative.    Neurological:  Negative for dizziness, facial asymmetry, light-headedness and headaches.   Hematological:  Negative for adenopathy.   Psychiatric/Behavioral:  Negative for agitation and behavioral problems.        Objective:      Physical Exam  Vitals and nursing note reviewed.   Constitutional:       General: He is not in acute distress.     Appearance: He is well-developed. He is not diaphoretic.   HENT:      Head: Normocephalic and atraumatic.   Cardiovascular:      Rate and Rhythm: Normal rate and regular rhythm.      Heart " sounds: Normal heart sounds. No murmur heard.  Pulmonary:      Effort: Pulmonary effort is normal. No respiratory distress.      Breath sounds: Normal breath sounds.   Abdominal:      General: There is no distension.      Tenderness: There is no abdominal tenderness. There is no guarding.   Musculoskeletal:        Feet:    Feet:      Comments: Pain with light touch   Skin:     General: Skin is warm and dry.      Findings: No rash.   Neurological:      Mental Status: He is alert and oriented to person, place, and time.   Psychiatric:         Behavior: Behavior normal.         Thought Content: Thought content normal.         Judgment: Judgment normal.         Assessment:       1. Chest pain, unspecified type    2. Pain in right foot    3. Essential hypertension    4. Mixed hyperlipidemia    5. BMI 45.0-49.9, adult        Plan:       Juan Pablo was seen today for follow up .    Diagnoses and all orders for this visit:    Chest pain, unspecified type  -     IN OFFICE EKG 12-LEAD (to [place])  -     Ambulatory referral/consult to Cardiology; Future    Pain in right foot  -     X-Ray Foot Complete Right; Future    Essential hypertension    Mixed hyperlipidemia    BMI 45.0-49.9, adult      Continue weight loss  Will get pt to cardio for checkup- last visit was in 2018  Red flags chest pain discussed  Foot pain likely neuropathy- will xray since pinpoint tenderness   BP stable  Keep follow up at as scheduled   Follow up for worsening or no improvement in symptoms and PRN.

## 2024-02-13 LAB
OHS QRS DURATION: 78 MS
OHS QTC CALCULATION: 424 MS

## 2024-02-24 DIAGNOSIS — E78.5 HYPERLIPIDEMIA, UNSPECIFIED HYPERLIPIDEMIA TYPE: ICD-10-CM

## 2024-02-24 DIAGNOSIS — G60.9 IDIOPATHIC PERIPHERAL NEUROPATHY: ICD-10-CM

## 2024-02-24 DIAGNOSIS — I10 ESSENTIAL HYPERTENSION: ICD-10-CM

## 2024-02-24 NOTE — TELEPHONE ENCOUNTER
No care due was identified.  Health [facility] Embedded Care Due Messages. Reference number: [id number].   2/24/2024 8:19:09 AM CST

## 2024-02-26 RX ORDER — LISINOPRIL 40 MG/1
40 TABLET ORAL DAILY
Qty: 90 TABLET | Refills: 3 | Status: SHIPPED | OUTPATIENT
Start: 2024-02-26

## 2024-02-26 RX ORDER — GABAPENTIN 300 MG/1
300 CAPSULE ORAL 2 TIMES DAILY
Qty: 120 CAPSULE | Refills: 3 | Status: SHIPPED | OUTPATIENT
Start: 2024-02-26

## 2024-02-26 RX ORDER — ATORVASTATIN CALCIUM 40 MG/1
40 TABLET, FILM COATED ORAL DAILY
Qty: 90 TABLET | Refills: 3 | Status: SHIPPED | OUTPATIENT
Start: 2024-02-26

## 2024-04-09 ENCOUNTER — PATIENT MESSAGE (OUTPATIENT)
Dept: INTERNAL MEDICINE | Facility: CLINIC | Age: 44
End: 2024-04-09
Payer: COMMERCIAL

## 2024-04-09 DIAGNOSIS — N18.31 STAGE 3A CHRONIC KIDNEY DISEASE: ICD-10-CM

## 2024-04-09 DIAGNOSIS — I10 ESSENTIAL HYPERTENSION: ICD-10-CM

## 2024-04-09 DIAGNOSIS — E66.01 MORBID OBESITY WITH BMI OF 50.0-59.9, ADULT: ICD-10-CM

## 2024-04-09 DIAGNOSIS — E88.819 INSULIN RESISTANCE: ICD-10-CM

## 2024-04-10 NOTE — TELEPHONE ENCOUNTER
Lv 2/9/24  Fu none     Pt was prescribed ozempic from [facility] but pharmacy never had it in stock requesting refill

## 2024-04-10 NOTE — TELEPHONE ENCOUNTER
No care due was identified.  Health [facility] Embedded Care Due Messages. Reference number: [id number].   4/10/2024 4:13:02 PM CDT

## 2024-04-11 RX ORDER — SEMAGLUTIDE 1.34 MG/ML
0.25 INJECTION, SOLUTION SUBCUTANEOUS
Qty: 4 EACH | Refills: 5 | Status: SHIPPED | OUTPATIENT
Start: 2024-04-11 | End: 2024-04-11 | Stop reason: ALTCHOICE

## 2024-04-11 RX ORDER — TIRZEPATIDE 2.5 MG/.5ML
2.5 INJECTION, SOLUTION SUBCUTANEOUS
Qty: 4 PEN | Refills: 1 | Status: SHIPPED | OUTPATIENT
Start: 2024-04-11

## 2024-04-11 NOTE — TELEPHONE ENCOUNTER
Pt would like a prescription for monjuro instead of ozempic  due to insurance covering monjurio sent to [facility] pharmacy

## 2024-04-11 NOTE — TELEPHONE ENCOUNTER
Called pt notified that  we cannot change prescription, notified insurance may not cover it advised to call insurance to see if they can cover wogovy or monjuro

## 2024-08-04 ENCOUNTER — PATIENT MESSAGE (OUTPATIENT)
Dept: INTERNAL MEDICINE | Facility: CLINIC | Age: 44
End: 2024-08-04
Payer: COMMERCIAL

## 2024-08-20 ENCOUNTER — OFFICE VISIT (OUTPATIENT)
Dept: INTERNAL MEDICINE | Facility: CLINIC | Age: 44
End: 2024-08-20
Payer: COMMERCIAL

## 2024-08-20 ENCOUNTER — LAB VISIT (OUTPATIENT)
Dept: LAB | Facility: HOSPITAL | Age: 44
End: 2024-08-20
Attending: NURSE PRACTITIONER
Payer: COMMERCIAL

## 2024-08-20 VITALS
HEIGHT: 73 IN | SYSTOLIC BLOOD PRESSURE: 126 MMHG | TEMPERATURE: 98 F | DIASTOLIC BLOOD PRESSURE: 88 MMHG | BODY MASS INDEX: 41.75 KG/M2 | HEART RATE: 94 BPM | OXYGEN SATURATION: 97 % | WEIGHT: 315 LBS

## 2024-08-20 DIAGNOSIS — E66.01 MORBID OBESITY WITH BMI OF 50.0-59.9, ADULT: ICD-10-CM

## 2024-08-20 DIAGNOSIS — N18.31 STAGE 3A CHRONIC KIDNEY DISEASE: ICD-10-CM

## 2024-08-20 DIAGNOSIS — M79.642 PAIN IN BOTH HANDS: ICD-10-CM

## 2024-08-20 DIAGNOSIS — E88.819 INSULIN RESISTANCE: Primary | ICD-10-CM

## 2024-08-20 DIAGNOSIS — G60.9 IDIOPATHIC PERIPHERAL NEUROPATHY: ICD-10-CM

## 2024-08-20 DIAGNOSIS — I10 ESSENTIAL HYPERTENSION: ICD-10-CM

## 2024-08-20 DIAGNOSIS — M79.671 PAIN IN BOTH FEET: ICD-10-CM

## 2024-08-20 DIAGNOSIS — M79.641 PAIN IN BOTH HANDS: ICD-10-CM

## 2024-08-20 DIAGNOSIS — M79.672 PAIN IN BOTH FEET: ICD-10-CM

## 2024-08-20 DIAGNOSIS — E88.819 INSULIN RESISTANCE: ICD-10-CM

## 2024-08-20 PROCEDURE — 86039 ANTINUCLEAR ANTIBODIES (ANA): CPT | Performed by: NURSE PRACTITIONER

## 2024-08-20 PROCEDURE — 82043 UR ALBUMIN QUANTITATIVE: CPT | Performed by: NURSE PRACTITIONER

## 2024-08-20 PROCEDURE — 4010F ACE/ARB THERAPY RXD/TAKEN: CPT | Mod: CPTII,S$GLB,, | Performed by: NURSE PRACTITIONER

## 2024-08-20 PROCEDURE — 3079F DIAST BP 80-89 MM HG: CPT | Mod: CPTII,S$GLB,, | Performed by: NURSE PRACTITIONER

## 2024-08-20 PROCEDURE — 86225 DNA ANTIBODY NATIVE: CPT | Performed by: NURSE PRACTITIONER

## 2024-08-20 PROCEDURE — 86038 ANTINUCLEAR ANTIBODIES: CPT | Performed by: NURSE PRACTITIONER

## 2024-08-20 PROCEDURE — 86140 C-REACTIVE PROTEIN: CPT | Performed by: NURSE PRACTITIONER

## 2024-08-20 PROCEDURE — 1159F MED LIST DOCD IN RCRD: CPT | Mod: CPTII,S$GLB,, | Performed by: NURSE PRACTITIONER

## 2024-08-20 PROCEDURE — 3074F SYST BP LT 130 MM HG: CPT | Mod: CPTII,S$GLB,, | Performed by: NURSE PRACTITIONER

## 2024-08-20 PROCEDURE — 83036 HEMOGLOBIN GLYCOSYLATED A1C: CPT | Performed by: NURSE PRACTITIONER

## 2024-08-20 PROCEDURE — 82570 ASSAY OF URINE CREATININE: CPT | Performed by: NURSE PRACTITIONER

## 2024-08-20 PROCEDURE — 1160F RVW MEDS BY RX/DR IN RCRD: CPT | Mod: CPTII,S$GLB,, | Performed by: NURSE PRACTITIONER

## 2024-08-20 PROCEDURE — 99214 OFFICE O/P EST MOD 30 MIN: CPT | Mod: S$GLB,,, | Performed by: NURSE PRACTITIONER

## 2024-08-20 PROCEDURE — 85025 COMPLETE CBC W/AUTO DIFF WBC: CPT | Performed by: NURSE PRACTITIONER

## 2024-08-20 PROCEDURE — 36415 COLL VENOUS BLD VENIPUNCTURE: CPT | Mod: PO | Performed by: NURSE PRACTITIONER

## 2024-08-20 PROCEDURE — 84550 ASSAY OF BLOOD/URIC ACID: CPT | Performed by: NURSE PRACTITIONER

## 2024-08-20 PROCEDURE — 86431 RHEUMATOID FACTOR QUANT: CPT | Performed by: NURSE PRACTITIONER

## 2024-08-20 PROCEDURE — 80061 LIPID PANEL: CPT | Performed by: NURSE PRACTITIONER

## 2024-08-20 PROCEDURE — 80053 COMPREHEN METABOLIC PANEL: CPT | Performed by: NURSE PRACTITIONER

## 2024-08-20 PROCEDURE — 84443 ASSAY THYROID STIM HORMONE: CPT | Performed by: NURSE PRACTITIONER

## 2024-08-20 PROCEDURE — 99999 PR PBB SHADOW E&M-EST. PATIENT-LVL III: CPT | Mod: PBBFAC,,, | Performed by: NURSE PRACTITIONER

## 2024-08-20 PROCEDURE — 3008F BODY MASS INDEX DOCD: CPT | Mod: CPTII,S$GLB,, | Performed by: NURSE PRACTITIONER

## 2024-08-20 PROCEDURE — 85652 RBC SED RATE AUTOMATED: CPT | Performed by: NURSE PRACTITIONER

## 2024-08-20 PROCEDURE — 86235 NUCLEAR ANTIGEN ANTIBODY: CPT | Mod: 59 | Performed by: NURSE PRACTITIONER

## 2024-08-20 NOTE — PROGRESS NOTES
"Subjective:       Patient ID: Juan Pablo Saeed is a 43 y.o. male.    Chief Complaint: Labs Only and Hand Pain (PAIN 24/7 /FINGERS /AND BOTH FEET )    HPI    /88   Pulse 94   Temp 98 °F (36.7 °C) (Tympanic)   Ht 6' 1" (1.854 m)   Wt (!) 166.1 kg (366 lb 2.9 oz)   SpO2 97%   BMI 48.31 kg/m²     Review of Systems    Objective:      Physical Exam    Assessment:       1. Insulin resistance    2. Essential hypertension    3. Idiopathic peripheral neuropathy    4. Pain in both hands    5. Pain in both feet    6. Stage 3a chronic kidney disease    7. Morbid obesity with BMI of 50.0-59.9, adult        Plan:       Juan Pablo was seen today for labs only and hand pain.    Diagnoses and all orders for this visit:    Insulin resistance  -     TSH; Future  -     Hemoglobin A1C; Future    Essential hypertension  -     CBC Auto Differential; Future  -     Comprehensive Metabolic Panel; Future  -     Lipid Panel; Future    Idiopathic peripheral neuropathy    Pain in both hands  -     KAYCE Screen w/Reflex; Future  -     C-Reactive Protein; Future  -     Rheumatoid Factor; Future  -     Sedimentation rate; Future  -     Uric Acid; Future    Pain in both feet  -     KAYCE Screen w/Reflex; Future  -     C-Reactive Protein; Future  -     Rheumatoid Factor; Future  -     Sedimentation rate; Future  -     Uric Acid; Future    Stage 3a chronic kidney disease  -     Microalbumin/Creatinine Ratio, Urine    Morbid obesity with BMI of 50.0-59.9, adult      There are no Patient Instructions on file for this visit.      "

## 2024-08-20 NOTE — PROGRESS NOTES
Subjective:       Patient ID: Juan Pablo Saeed is a 43 y.o. male.    Chief Complaint: Labs Only and Hand Pain (PAIN 24/7 /FINGERS /AND BOTH FEET )      The patient presents with chronic pain in his fingers and feet, as well as concerns about his weight and potential worsening of kidney issues.    HPI:  Mr. Saeed reports constant pain in his fingers and feet, attributed to neuropathy he has been managing for over 10 years. The finger pain is persistent, primarily localized between the fingers, across the knuckles, and across the cuticle of his fingers, worsening when he lies down. He frequently massages his fingers and uses massage guns for relief, which he finds beneficial.    The patient has severe foot pain when not wearing socks and describes extreme coldness without coverage. He has purchased wool sock shoes to manage this issue and reports swelling in his feet.     He has been taking Tylenol for pain relief but finds it no longer effective. He continues to take gabapentin as prescribed.    The patient mentions recent ant bites that darken on his skin.     He reports persistent fatigue and expresses concern about potentially worsening kidney issues. He mentions being prediabetic.    Regarding urological issues, the patient describes an intermittent itchy sensation inside his urethra, exacerbated by sweating due to the [place] climate. He denies any visible rash and states that his partner is asymptomatic.    The patient had a circumcision in 2010 and occasionally has pain related to this procedure, particularly during erections. He was prescribed vitamin E lotion by Dr. Hampton to help with scar tissue, which has improved the condition, but he still has occasional pain.    His current weight is 366 lbs, down from 370 lbs in December. He expresses a desire to lose weight and interest in weight loss medications.  He particularly is interested in GLP1 meds. There is no history of multiple endocrine neoplasia and  "there is no history of thyroid cancer in the patient.  We discussed the effects and side effects of the medication as well as insurance coverage around it. Will await his labs     The patient denies any new pain, redness or heat in his joints other than the swelling in his feet. He denies having been diagnosed with gout or diabetes.        /88   Pulse 94   Temp 98 °F (36.7 °C) (Tympanic)   Ht 6' 1" (1.854 m)   Wt (!) 166.1 kg (366 lb 2.9 oz)   SpO2 97%   BMI 48.31 kg/m²     Review of Systems   Constitutional:  Negative for activity change, appetite change, chills, diaphoresis, fatigue, fever and unexpected weight change.   HENT: Negative.     Eyes: Negative.    Respiratory:  Negative for apnea, chest tightness, shortness of breath and stridor.    Cardiovascular:  Negative for chest pain, palpitations and leg swelling.   Gastrointestinal: Negative.    Endocrine: Negative.    Genitourinary:  Positive for penile pain.   Musculoskeletal:  Positive for arthralgias, back pain, joint swelling and myalgias.   Skin:  Negative for color change, pallor, rash and wound.   Allergic/Immunologic: Negative.    Neurological:  Negative for dizziness, facial asymmetry, light-headedness and headaches.   Hematological:  Negative for adenopathy.   Psychiatric/Behavioral:  Negative for agitation and behavioral problems.        Objective:      Physical Exam  Vitals and nursing note reviewed.   Constitutional:       General: He is not in acute distress.     Appearance: He is well-developed. He is obese. He is not diaphoretic.   HENT:      Head: Normocephalic and atraumatic.   Cardiovascular:      Rate and Rhythm: Normal rate and regular rhythm.      Heart sounds: Normal heart sounds.   Pulmonary:      Effort: Pulmonary effort is normal. No respiratory distress.      Breath sounds: Normal breath sounds.   Musculoskeletal:      Right hand: Bony tenderness present. No swelling or deformity.      Left hand: Bony tenderness present. " No swelling or deformity.      Right foot: Normal capillary refill. Tenderness present. Normal pulse.      Left foot: Normal capillary refill. Tenderness present. Normal pulse.   Skin:     General: Skin is warm and dry.      Findings: No rash.   Neurological:      Mental Status: He is alert and oriented to person, place, and time.   Psychiatric:         Behavior: Behavior normal.         Thought Content: Thought content normal.         Judgment: Judgment normal.         Assessment and Plan        1. Insulin resistance    2. Essential hypertension    3. Idiopathic peripheral neuropathy    4. Pain in both hands    5. Pain in both feet    6. Stage 3a chronic kidney disease    7. Morbid obesity with BMI of 50.0-59.9, adult        Assessment & Plan    NEUROPATHY:  - Assessed chronic pain in fingers and feet, likely related to longstanding neuropathy.  - Continued gabapentin at current dose.  RHEUMATOID ARTHRITIS:  - Considered potential rheumatoid arthritis as contributing factor to joint pain and swelling.  - Ordered comprehensive labs including rheumatoid factor, and other inflammation markers.  WEIGHT MANAGEMENT:  - Evaluated fatigue and weight management options.  - If A1C normal, explore weight loss medication options (e.g., Wegovy, Zepbound).  - Explained potential side effects of weight loss medications, including nausea, vomiting, diarrhea, constipation, upset stomach, gallbladder disease, and pancreatitis.  - If A1C elevated, consider diabetes medication (e.g., Mounjaro).  YEAST INFECTION:  - Suspected possible yeast infection as cause of genital itching.  - Discussed that genital itching could be due to yeast overgrowth, especially in humid climates.  - Mr. Voes to try OTC athlete's foot powder to manage genital itching and moisture.  CIRCUMCISION-RELATED DISCOMFORT:  - Noted circumcision-related discomfort, potentially due to scar tissue.  - Explained that circumcision-related discomfort could be due to scar  tissue formation.  - Mr. Saeed to continue using vitamin E lotion for circumcision scar tissue as previously recommended by Dr. Freire.  - Consider referral to urologist for ongoing circumcision-related discomfort if symptoms persist.  MEDICATIONS/SUPPLEMENTS:  - Continued lisinopril, atorvastatin, and losartan at current doses.  LABS:  - Ordered comprehensive labs including A1C, kidney function tests, thyroid function tests.  FOLLOW UP:  - 6 months with Dr. Hampton and KATIA            This note was generated with the assistance of ambient listening technology. Verbal consent was obtained by the patient and accompanying visitor(s) for the recording of patient appointment to facilitate this note. I attest to having reviewed and edited the generated note for accuracy, though some syntax or spelling errors may persist. Please contact the author of this note for any clarification.

## 2024-08-21 LAB
ALBUMIN SERPL BCP-MCNC: 3.9 G/DL (ref 3.5–5.2)
ALBUMIN/CREAT UR: NORMAL UG/MG (ref 0–30)
ALP SERPL-CCNC: 58 U/L (ref 55–135)
ALT SERPL W/O P-5'-P-CCNC: 28 U/L (ref 10–44)
ANA PATTERN 1: NORMAL
ANA SER QL IF: POSITIVE
ANA TITR SER IF: NORMAL {TITER}
ANION GAP SERPL CALC-SCNC: 13 MMOL/L (ref 8–16)
AST SERPL-CCNC: 26 U/L (ref 10–40)
BASOPHILS # BLD AUTO: 0.02 K/UL (ref 0–0.2)
BASOPHILS NFR BLD: 0.4 % (ref 0–1.9)
BILIRUB SERPL-MCNC: 0.8 MG/DL (ref 0.1–1)
BUN SERPL-MCNC: 15 MG/DL (ref 6–20)
CALCIUM SERPL-MCNC: 9.7 MG/DL (ref 8.7–10.5)
CHLORIDE SERPL-SCNC: 105 MMOL/L (ref 95–110)
CHOLEST SERPL-MCNC: 179 MG/DL (ref 120–199)
CHOLEST/HDLC SERPL: 4.8 {RATIO} (ref 2–5)
CO2 SERPL-SCNC: 23 MMOL/L (ref 23–29)
CREAT SERPL-MCNC: 1.6 MG/DL (ref 0.5–1.4)
CREAT UR-MCNC: 134 MG/DL (ref 23–375)
CRP SERPL-MCNC: 5.2 MG/L (ref 0–8.2)
DIFFERENTIAL METHOD BLD: ABNORMAL
EOSINOPHIL # BLD AUTO: 0.1 K/UL (ref 0–0.5)
EOSINOPHIL NFR BLD: 1.5 % (ref 0–8)
ERYTHROCYTE [DISTWIDTH] IN BLOOD BY AUTOMATED COUNT: 13.6 % (ref 11.5–14.5)
ERYTHROCYTE [SEDIMENTATION RATE] IN BLOOD BY PHOTOMETRIC METHOD: 26 MM/HR (ref 0–23)
EST. GFR  (NO RACE VARIABLE): 54.1 ML/MIN/1.73 M^2
ESTIMATED AVG GLUCOSE: 114 MG/DL (ref 68–131)
GLUCOSE SERPL-MCNC: 60 MG/DL (ref 70–110)
HBA1C MFR BLD: 5.6 % (ref 4–5.6)
HCT VFR BLD AUTO: 46.5 % (ref 40–54)
HDLC SERPL-MCNC: 37 MG/DL (ref 40–75)
HDLC SERPL: 20.7 % (ref 20–50)
HGB BLD-MCNC: 14.7 G/DL (ref 14–18)
IMM GRANULOCYTES # BLD AUTO: 0.02 K/UL (ref 0–0.04)
IMM GRANULOCYTES NFR BLD AUTO: 0.4 % (ref 0–0.5)
LDLC SERPL CALC-MCNC: 103.4 MG/DL (ref 63–159)
LYMPHOCYTES # BLD AUTO: 1.8 K/UL (ref 1–4.8)
LYMPHOCYTES NFR BLD: 33.8 % (ref 18–48)
MCH RBC QN AUTO: 27.4 PG (ref 27–31)
MCHC RBC AUTO-ENTMCNC: 31.6 G/DL (ref 32–36)
MCV RBC AUTO: 87 FL (ref 82–98)
MICROALBUMIN UR DL<=1MG/L-MCNC: <5 UG/ML
MONOCYTES # BLD AUTO: 0.6 K/UL (ref 0.3–1)
MONOCYTES NFR BLD: 11.2 % (ref 4–15)
NEUTROPHILS # BLD AUTO: 2.8 K/UL (ref 1.8–7.7)
NEUTROPHILS NFR BLD: 52.7 % (ref 38–73)
NONHDLC SERPL-MCNC: 142 MG/DL
NRBC BLD-RTO: 0 /100 WBC
PLATELET # BLD AUTO: 244 K/UL (ref 150–450)
PMV BLD AUTO: 10.3 FL (ref 9.2–12.9)
POTASSIUM SERPL-SCNC: 4.2 MMOL/L (ref 3.5–5.1)
PROT SERPL-MCNC: 7.6 G/DL (ref 6–8.4)
RBC # BLD AUTO: 5.36 M/UL (ref 4.6–6.2)
RHEUMATOID FACT SERPL-ACNC: <13 IU/ML (ref 0–15)
SODIUM SERPL-SCNC: 141 MMOL/L (ref 136–145)
TRIGL SERPL-MCNC: 193 MG/DL (ref 30–150)
TSH SERPL DL<=0.005 MIU/L-ACNC: 1.41 UIU/ML (ref 0.4–4)
URATE SERPL-MCNC: 7.1 MG/DL (ref 3.4–7)
WBC # BLD AUTO: 5.38 K/UL (ref 3.9–12.7)

## 2024-08-22 DIAGNOSIS — M79.641 PAIN IN BOTH HANDS: ICD-10-CM

## 2024-08-22 DIAGNOSIS — M79.642 PAIN IN BOTH HANDS: ICD-10-CM

## 2024-08-22 DIAGNOSIS — R76.8 POSITIVE ANA (ANTINUCLEAR ANTIBODY): Primary | ICD-10-CM

## 2024-08-22 DIAGNOSIS — M79.672 PAIN IN BOTH FEET: ICD-10-CM

## 2024-08-22 DIAGNOSIS — M79.671 PAIN IN BOTH FEET: ICD-10-CM

## 2024-08-22 LAB
ANTI SM ANTIBODY: 0.07 RATIO (ref 0–0.99)
ANTI SM/RNP ANTIBODY: 0.08 RATIO (ref 0–0.99)
ANTI-SM INTERPRETATION: NEGATIVE
ANTI-SM/RNP INTERPRETATION: NEGATIVE
ANTI-SSA ANTIBODY: 0.05 RATIO (ref 0–0.99)
ANTI-SSA INTERPRETATION: NEGATIVE
ANTI-SSB ANTIBODY: 0.07 RATIO (ref 0–0.99)
ANTI-SSB INTERPRETATION: NEGATIVE
DSDNA AB SER-ACNC: NORMAL [IU]/ML

## 2024-08-27 ENCOUNTER — E-VISIT (OUTPATIENT)
Dept: INTERNAL MEDICINE | Facility: CLINIC | Age: 44
End: 2024-08-27
Payer: COMMERCIAL

## 2024-08-27 DIAGNOSIS — R21 RASH: Primary | ICD-10-CM

## 2024-08-28 RX ORDER — CLOTRIMAZOLE AND BETAMETHASONE DIPROPIONATE 10; .64 MG/G; MG/G
CREAM TOPICAL 2 TIMES DAILY
Qty: 45 G | Refills: 1 | Status: SHIPPED | OUTPATIENT
Start: 2024-08-28

## 2024-08-28 NOTE — PROGRESS NOTES
Subjective:       Patient ID: Juan Pablo Saeed is a 43 y.o. male.    Chief Complaint: General Illness (Entered automatically based on patient selection in [employer]    HPI    There were no vitals taken for this visit.    Review of Systems    Objective:      Physical Exam    Assessment:       1. Rash        Plan:       Juan Pablo was seen today for general illness.    Diagnoses and all orders for this visit:    Rash    Other orders  -     clotrimazole-betamethasone 1-0.05% (LOTRISONE) cream; Apply topically 2 (two) times daily.      There are no Patient Instructions on file for this visit.

## 2024-11-07 ENCOUNTER — OFFICE VISIT (OUTPATIENT)
Dept: RHEUMATOLOGY | Facility: CLINIC | Age: 44
End: 2024-11-07
Payer: COMMERCIAL

## 2024-11-07 ENCOUNTER — OFFICE VISIT (OUTPATIENT)
Dept: SURGERY | Facility: CLINIC | Age: 44
End: 2024-11-07
Payer: COMMERCIAL

## 2024-11-07 VITALS
HEIGHT: 72 IN | DIASTOLIC BLOOD PRESSURE: 81 MMHG | WEIGHT: 315 LBS | HEART RATE: 84 BPM | SYSTOLIC BLOOD PRESSURE: 118 MMHG | BODY MASS INDEX: 42.66 KG/M2

## 2024-11-07 VITALS
HEIGHT: 72 IN | SYSTOLIC BLOOD PRESSURE: 115 MMHG | BODY MASS INDEX: 42.66 KG/M2 | TEMPERATURE: 99 F | WEIGHT: 315 LBS | OXYGEN SATURATION: 98 % | HEART RATE: 70 BPM | DIASTOLIC BLOOD PRESSURE: 76 MMHG

## 2024-11-07 DIAGNOSIS — R76.8 POSITIVE ANA (ANTINUCLEAR ANTIBODY): Primary | ICD-10-CM

## 2024-11-07 DIAGNOSIS — M79.642 PAIN IN BOTH HANDS: ICD-10-CM

## 2024-11-07 DIAGNOSIS — M79.671 PAIN IN BOTH FEET: ICD-10-CM

## 2024-11-07 DIAGNOSIS — K64.5 THROMBOSED EXTERNAL HEMORRHOID: Primary | ICD-10-CM

## 2024-11-07 DIAGNOSIS — M79.672 PAIN IN BOTH FEET: ICD-10-CM

## 2024-11-07 DIAGNOSIS — M79.641 PAIN IN BOTH HANDS: ICD-10-CM

## 2024-11-07 DIAGNOSIS — R20.2 PARESTHESIAS: ICD-10-CM

## 2024-11-07 PROCEDURE — 3066F NEPHROPATHY DOC TX: CPT | Mod: CPTII,S$GLB,, | Performed by: STUDENT IN AN ORGANIZED HEALTH CARE EDUCATION/TRAINING PROGRAM

## 2024-11-07 PROCEDURE — 3079F DIAST BP 80-89 MM HG: CPT | Mod: CPTII,S$GLB,, | Performed by: STUDENT IN AN ORGANIZED HEALTH CARE EDUCATION/TRAINING PROGRAM

## 2024-11-07 PROCEDURE — 3074F SYST BP LT 130 MM HG: CPT | Mod: CPTII,S$GLB,, | Performed by: STUDENT IN AN ORGANIZED HEALTH CARE EDUCATION/TRAINING PROGRAM

## 2024-11-07 PROCEDURE — 3078F DIAST BP <80 MM HG: CPT | Mod: CPTII,S$GLB,, | Performed by: STUDENT IN AN ORGANIZED HEALTH CARE EDUCATION/TRAINING PROGRAM

## 2024-11-07 PROCEDURE — 3061F NEG MICROALBUMINURIA REV: CPT | Mod: CPTII,S$GLB,, | Performed by: STUDENT IN AN ORGANIZED HEALTH CARE EDUCATION/TRAINING PROGRAM

## 2024-11-07 PROCEDURE — 3044F HG A1C LEVEL LT 7.0%: CPT | Mod: CPTII,S$GLB,, | Performed by: STUDENT IN AN ORGANIZED HEALTH CARE EDUCATION/TRAINING PROGRAM

## 2024-11-07 PROCEDURE — 99204 OFFICE O/P NEW MOD 45 MIN: CPT | Mod: S$GLB,,, | Performed by: STUDENT IN AN ORGANIZED HEALTH CARE EDUCATION/TRAINING PROGRAM

## 2024-11-07 PROCEDURE — 99999 PR PBB SHADOW E&M-EST. PATIENT-LVL III: CPT | Mod: PBBFAC,,, | Performed by: STUDENT IN AN ORGANIZED HEALTH CARE EDUCATION/TRAINING PROGRAM

## 2024-11-07 PROCEDURE — 1159F MED LIST DOCD IN RCRD: CPT | Mod: CPTII,S$GLB,, | Performed by: STUDENT IN AN ORGANIZED HEALTH CARE EDUCATION/TRAINING PROGRAM

## 2024-11-07 PROCEDURE — 4010F ACE/ARB THERAPY RXD/TAKEN: CPT | Mod: CPTII,S$GLB,, | Performed by: STUDENT IN AN ORGANIZED HEALTH CARE EDUCATION/TRAINING PROGRAM

## 2024-11-07 PROCEDURE — 3008F BODY MASS INDEX DOCD: CPT | Mod: CPTII,S$GLB,, | Performed by: STUDENT IN AN ORGANIZED HEALTH CARE EDUCATION/TRAINING PROGRAM

## 2024-11-07 PROCEDURE — 99213 OFFICE O/P EST LOW 20 MIN: CPT | Mod: S$GLB,,, | Performed by: STUDENT IN AN ORGANIZED HEALTH CARE EDUCATION/TRAINING PROGRAM

## 2024-11-07 PROCEDURE — 99999 PR PBB SHADOW E&M-EST. PATIENT-LVL IV: CPT | Mod: PBBFAC,,, | Performed by: STUDENT IN AN ORGANIZED HEALTH CARE EDUCATION/TRAINING PROGRAM

## 2024-11-07 RX ORDER — SEMAGLUTIDE 0.68 MG/ML
0.5 INJECTION, SOLUTION SUBCUTANEOUS
COMMUNITY

## 2024-11-07 RX ORDER — PREGABALIN 75 MG/1
75 CAPSULE ORAL 2 TIMES DAILY
Qty: 60 CAPSULE | Refills: 5 | Status: SHIPPED | OUTPATIENT
Start: 2024-11-07 | End: 2025-05-08

## 2024-11-07 RX ORDER — OMEPRAZOLE 20 MG/1
1 CAPSULE, DELAYED RELEASE ORAL EVERY MORNING
COMMUNITY
Start: 2024-10-24 | End: 2025-10-24

## 2024-11-07 NOTE — PROGRESS NOTES
11/6/2024    10:23 AM   Rapid3 Question Responses and Scores   MDHAQ Score 0   Psychologic Score 3.3   Pain Score 7   When you awakened in the morning OVER THE LAST WEEK, did you feel stiff? No   Fatigue Score 6   Global Health Score 6   RAPID3 Score 4.33

## 2024-11-07 NOTE — PROGRESS NOTES
"       RHEUMATOLOGY OUTPATIENT CLINIC NOTE    11/7/2024    Attending Rheumatologist: Jennifer Porras  Primary Care Provider: Jordin Hampton MD   Physician Requesting Consultation: [place]  [id number] [place] [id number]  Chief Complaint/Reason For Consultation:  Joint Pain (Bilateral hand pain, R>L foot pain. Hurts most when cold. ) and Positive AKYCE      Subjective:       HPI  Juan Pablo Saeed is a 44 y.o. Black or  male who comes for evaluation of positive KAYCE.     He reports pain in feet about 10 years ago, located in the midfoot and toes, pain is constant but worse with cold exposure. Has to keep socks all the time.   Ankle swelling at the end of the day. He reports pain tk hands, located in thumbs, in between fingers,  Has not noted swelling. He is always massaging his fingers. Worse with cold exposure.   He denies any raynauds   He has seen neurology for this. He was told he has neuropathy. He reports that he had extensive workup done that was negative, except for prediabetes.   He has been on gabapentin 300 mg BID. He does not take it TID because it makes him sleepy   He noted nail pitting in left 2nd nail.     He has CKD of unknown etiology. Has seen nephrology in the past. Had proteinuria int he past but most recent microalbumin/creatinine ratio was normal.   He also saw rheumatologist in 2020 at . Per notes "he had a abdominal fat pad biopsy to look for amyloidosis. I cannot find the results, but the subsequent nephrology notes do not mention amyloidosis as a diagnosis"    No family history of psoriasis. No autoimmune conditions.     Works in plant operating heavy machine     Labs  8/2024  KAYCE 1:80 homogeneous  Negative DSDNA, SSA, SSB, SM, SM/RNP  CBC unremarkable  CMP creatinine 1.6 stable GFR 54  TSH normal  CRP, ESR negative  RF negative  Uric acid 7.1    2020  CCP negative    Imaging  2/2024  XR foot: Mild narrowing with spurring the first MTP. "     Review of Systems   Constitutional:  Negative for fever and unexpected weight change.   HENT:  Negative for mouth sores and trouble swallowing.    Eyes:  Negative for redness.   Respiratory:  Negative for cough and shortness of breath.    Cardiovascular:  Negative for chest pain.   Gastrointestinal:  Positive for constipation. Negative for diarrhea.   Genitourinary:  Negative for genital sores.   Integumentary:  Negative for rash.   Neurological:  Positive for headaches.   Hematological:  Does not bruise/bleed easily.        Chronic comorbid conditions affecting medical decision making today:  Past Medical History:   Diagnosis Date    Back pain     GERD (gastroesophageal reflux disease)     HTN (hypertension)     Mixed hyperlipidemia     Obesity      Past Surgical History:   Procedure Laterality Date    CIRCUMCISION  2010    COLONOSCOPY N/A 11/9/2023    Procedure: COLONOSCOPY- possible banding;  Surgeon: Ce Herrera MD;  Location: [facility];  Service: Colon and Rectal;  Laterality: N/A;     Family History   Problem Relation Name Age of Onset    Arthritis Mother      Cancer Mother          stomach    Diabetes Brother      Alcohol abuse Brother      Stroke Maternal Grandmother       Social History     Substance and Sexual Activity   Alcohol Use Yes    Comment: 1 x monthly     Social History     Tobacco Use   Smoking Status Former    Types: Cigars    Passive exposure: Never   Smokeless Tobacco Never     Social History     Substance and Sexual Activity   Drug Use Never       Current Outpatient Medications:     acetaminophen (TYLENOL) 325 MG tablet, Take 325 mg by mouth every 6 (six) hours as needed for Pain., Disp: , Rfl:     atorvastatin (LIPITOR) 40 MG tablet, Take 1 tablet (40 mg total) by mouth once daily., Disp: 90 tablet, Rfl: 3    lisinopriL (PRINIVIL,ZESTRIL) 40 MG tablet, Take 1 tablet (40 mg total) by mouth once daily., Disp: 90 tablet, Rfl: 3    multivitamin with minerals tablet, Take 1 tablet by  mouth., Disp: , Rfl:     omeprazole (PRILOSEC) 20 MG capsule, Take 1 capsule by mouth every morning., Disp: , Rfl:     pregabalin (LYRICA) 75 MG capsule, Take 1 capsule (75 mg total) by mouth 2 (two) times daily., Disp: 60 capsule, Rfl: 5     Objective:         Vitals:    11/07/24 0932   BP: 118/81   Pulse: 84     Physical Exam   Constitutional: He is oriented to person, place, and time. He appears well-developed and obese.   HENT:   Head: Normocephalic.   Pulmonary/Chest: Effort normal.   Abdominal: Soft.   Musculoskeletal:      Cervical back: Neck supple.      Comments: Shoulders: FROM; no synovitis;  Elbows: FROM; no synovitis; no tophi or nodules  Wrists: FROM; no synovitis;    MCPs: FROM; no synovitis;   PIPs:FROM; no synovitis;   DIPs: FROM; no synovitis;   HIPS: FROM  Knees: FROM; no synovitis; no instability  Ankles: FROM: no synovitis   Toes: no tenderness on palpation.     Neurological: He is alert and oriented to person, place, and time.   Skin: No rash noted.   No Heliotrope rash  No Gottron papules  No sclerodactyly   No Raynaud's  No Petechiae  No discoid lesions  No alopecia  Normal Capillaroscopy  Nail pitting in left 2nd digit    Vitals reviewed.      Reviewed old and all outside pertinent medical records available.    All lab results personally reviewed and interpreted by me.  Lab Results   Component Value Date    WBC 5.38 08/20/2024    HGB 14.7 08/20/2024    HCT 46.5 08/20/2024    MCV 87 08/20/2024    MCH 27.4 08/20/2024    MCHC 31.6 (L) 08/20/2024    RDW 13.6 08/20/2024     08/20/2024    MPV 10.3 08/20/2024       Lab Results   Component Value Date     08/20/2024    K 4.2 08/20/2024     08/20/2024    CO2 23 08/20/2024    GLU 60 (L) 08/20/2024    BUN 15 08/20/2024    CALCIUM 9.7 08/20/2024    PROT 7.6 08/20/2024    ALBUMIN 3.9 08/20/2024    BILITOT 0.8 08/20/2024    AST 26 08/20/2024    ALKPHOS 58 08/20/2024    ALT 28 08/20/2024       Lab Results   Component Value Date    COLORU  "Yellow 05/15/2023    APPEARANCEUA Clear 01/25/2018    SPECGRAV 1.020 05/15/2023    PHUR 5 05/15/2023    PROTEINUA Negative 01/25/2018    KETONESU negative 05/15/2023    LEUKOCYTESUR Negative 01/25/2018    NITRITE negative 05/15/2023    UROBILINOGEN normal 05/15/2023       Lab Results   Component Value Date    CRP 5.2 08/20/2024       Lab Results   Component Value Date    RF <13.0 08/20/2024    SEDRATE 26 (H) 08/20/2024       No components found for: "25OHVITDTOT", "64NUJSXU6", "35PJRWCD7", "METHODNOTE"    Lab Results   Component Value Date    URICACID 7.1 (H) 08/20/2024       Lab Results   Component Value Date    HEPBSAG Negative 08/22/2016    HEPCAB Negative 08/22/2016       Imaging:  All imaging reviewed and independently interpreted by me.         ASSESSMENT / PLAN:     Juan Pablo Saeed is a 44 y.o. Black or  male with:    1. Positive KAYCE (antinuclear antibody) (Primary)  -KAYCE 1:80 homogeneous with negative sub-serologies.   -his symptoms do not appear to be related to positive KAYCE at this moment     2. Pain in both feet  3. Pain in both hands  4. Paresthesias  -His main symptoms are long standing history of paresthesias involving hands and feet. Pain is not consistent with inflammatory arthritis. It appears he has had extensive workup many years ago by neurologist. He may have had an abdominal fat pad biopsy to rule out amyloidosis. At this moment, will obtain workup again including vitamin levels.   -change gabapentin to lyrica 75 mg BID.  verified.   -obtain XR of hands and feet     Follow up in about 3 months (around 2/7/2025).    Method of contact with patient concerns: Otto attn Rheumatology    Disclaimer:  This note is prepared using voice recognition software and as such is likely to have errors and has not been proof read. Please contact me for questions.     Time spent: 40 minutes in face to face discussion concerning diagnosis, prognosis, review of lab and test results, benefits " of treatment as well as management of disease, counseling of patient and coordination of care between various health care providers.      Jennifer Porras M.D.  Rheumatology  [facility]

## 2024-11-08 NOTE — PROGRESS NOTES
CRS Office Visit    SUBJECTIVE:     Chief Complaint: anal pain    History of Present Illness:  Patient is a 44 y.o. male presents with anal pain after constipated stool. Reports he was using ozempic and had a very hard stool which required him to strain. 4 days ago while straining he noticed severe anal pain. Pain improved with hot showers and has mostly subsided now. Never had prior episode      Review of patient's allergies indicates:  No Known Allergies    Past Medical History:   Diagnosis Date    Back pain     GERD (gastroesophageal reflux disease)     HTN (hypertension)     Mixed hyperlipidemia     Obesity      Past Surgical History:   Procedure Laterality Date    CIRCUMCISION  2010    COLONOSCOPY N/A 11/9/2023    Procedure: COLONOSCOPY- possible banding;  Surgeon: Ce Herrera MD;  [facility];  Service: Colon and Rectal;  Laterality: N/A;     Family History   Problem Relation Name Age of Onset    Arthritis Mother      Cancer Mother          stomach    Diabetes Brother      Alcohol abuse Brother      Stroke Maternal Grandmother       Social History     Tobacco Use    Smoking status: Former     Types: Cigars     Passive exposure: Never    Smokeless tobacco: Never   Substance Use Topics    Alcohol use: Yes     Comment: 1 x monthly    Drug use: Never          OBJECTIVE:     Vital Signs (Most Recent)  Temp: 99 °F (37.2 °C) (11/07/24 1307)  Pulse: 70 (11/07/24 1307)  BP: 115/76 (11/07/24 1307)  SpO2: 98 % (11/07/24 1307)    Physical Exam:  Physical Exam  Constitutional:       Appearance: He is well-developed.   HENT:      Head: Normocephalic and atraumatic.   Eyes:      Conjunctiva/sclera: Conjunctivae normal.      Pupils: Pupils are equal, round, and reactive to light.   Neck:      Thyroid: No thyromegaly.   Cardiovascular:      Rate and Rhythm: Normal rate and regular rhythm.   Pulmonary:      Effort: Pulmonary effort is normal. No respiratory distress.   Abdominal:      General: There is no  distension.      Palpations: Abdomen is soft. There is no mass.      Tenderness: There is no abdominal tenderness.   Genitourinary:     Comments: Right anterior thrombosed external hemorrhoid, soft with minimal TTP  Musculoskeletal:         General: No tenderness. Normal range of motion.      Cervical back: Normal range of motion.   Skin:     General: Skin is warm and dry.      Capillary Refill: Capillary refill takes less than 2 seconds.   Neurological:      General: No focal deficit present.      Mental Status: He is alert and oriented to person, place, and time.           ASSESSMENT/PLAN:     Juan Pablo was seen today for hemorrhoids.    Diagnoses and all orders for this visit:    Thrombosed external hemorrhoid    - given that pain has improved will defer excision  - recommend witch hazel/tucks pads and sitz baths PRN  - Discussed that a minimum I would recommend behavioral, lifestyle and medication modifications to improve bowel habits. Usual bowel management handout given to patient. This includes a stool softener twice per day, fiber powder supplementation daily, drinking at least 64oz of water/day, avoiding straining with bowel movements, spending less than 5 min on toilet per bowel movement, eating a high fiber diet, using miralax as needed to achieve a bowel movement daily and using wet wipes to wipe after bowel movements when irritated.   - rtc prn

## 2024-12-09 ENCOUNTER — E-VISIT (OUTPATIENT)
Dept: INTERNAL MEDICINE | Facility: CLINIC | Age: 44
End: 2024-12-09
Payer: COMMERCIAL

## 2024-12-09 DIAGNOSIS — R30.0 DYSURIA: Primary | ICD-10-CM

## 2024-12-13 RX ORDER — SULFAMETHOXAZOLE AND TRIMETHOPRIM 800; 160 MG/1; MG/1
1 TABLET ORAL 2 TIMES DAILY
Qty: 14 TABLET | Refills: 0 | Status: SHIPPED | OUTPATIENT
Start: 2024-12-13 | End: 2024-12-20

## 2024-12-13 NOTE — PROGRESS NOTES
Patient ID: Juan Pablo Saeed is a 44 y.o. male.    Chief Complaint: General Illness (Entered automatically based on patient selection in [email or web address].)    The patient initiated a request through [email or web address] on 12/9/2024 for evaluation and management with a chief complaint of General Illness (Entered automatically based on patient selection in [email or web address].)     I evaluated the questionnaire submission on 12/13/24.    [facility]    12/9/2024  7:37 AM CST - Filed by Patient   What do you need help with? Urinary Symptoms   Do you agree to participate in an E-Visit? Yes   If you have any of the following symptoms, please present to your local emergency room or call 911:  I acknowledge   What is the main issue you would like addressed today? Smelly urine   What symptoms do you currently have? Change in urine appearance or smell   When did your symptoms first appear? 11/24/2024   List what you have done or taken to help your symptoms. None   Please indicate whether you have had the following symptoms during the past 24 hours     Urgent urination (a sudden and uncontrollable urge to urinate) None   Frequent urination of small amounts of urine (going to the toilet very often) None   Burning pain when urinating None   Incomplete bladder emptying (still feel like you need to urinate again after urination) None   Pain not associated with urination in the lower abdomen below the belly button) Mild   What does your urine look like? Clear   Blood seen in the urine None   Flank pain (pain in one or both sides of the lower back) None   Discharge from the urethra (urinary opening) without urination None   High body temperature/fever? None-<99.5   Please rate how much discomfort you have experience because of the symptoms in the past 24 hours: None   Please indicate how the symptoms have interfered with your every day activities/work in the past 24 hours: None   Please indicate how these symptoms have interfered with  your social activities (visiting people, meeting with friends, etc.) in the past 24 hours? None   Are you a diabetic? Maybe   Provide any additional information you feel is important.    Please attach any relevant images or files (if you have performed a home test for UTI, please submit a photo of results)    Are you able to take your vital signs? Yes   Systolic Blood Pressure: 118   Diastolic Blood Pressure: 83   Weight: 344   Height: 72   Pulse: 85   Temperature: 97.8   Respiration rate:    Pulse Oxygen:          Encounter Diagnosis   Name Primary?    Dysuria Yes        No orders of the defined types were placed in this encounter.     Medications Ordered This Encounter   Medications    sulfamethoxazole-trimethoprim 800-160mg (BACTRIM DS) 800-160 mg Tab     Sig: Take 1 tablet by mouth 2 (two) times daily. for 7 days     Dispense:  14 tablet     Refill:  0      Juan Pablo was seen today for general illness.    Diagnoses and all orders for this visit:    Dysuria  Comments:  Bactrim ds one po bid x 7 days. push fluids. report any change in symptoms  Orders:  -     sulfamethoxazole-trimethoprim 800-160mg (BACTRIM DS) 800-160 mg Tab; Take 1 tablet by mouth 2 (two) times daily. for 7 days          Follow up if symptoms worsen or fail to improve.      E-Visit Time Tracking:

## 2025-02-01 ENCOUNTER — E-VISIT (OUTPATIENT)
Dept: INTERNAL MEDICINE | Facility: CLINIC | Age: 45
End: 2025-02-01
Payer: COMMERCIAL

## 2025-02-01 DIAGNOSIS — E88.819 INSULIN RESISTANCE: ICD-10-CM

## 2025-02-01 DIAGNOSIS — E66.01 MORBID OBESITY WITH BMI OF 50.0-59.9, ADULT: Primary | ICD-10-CM

## 2025-02-03 PROCEDURE — 99421 OL DIG E/M SVC 5-10 MIN: CPT | Mod: ,,, | Performed by: INTERNAL MEDICINE

## 2025-02-03 RX ORDER — SEMAGLUTIDE 0.25 MG/.5ML
0.25 INJECTION, SOLUTION SUBCUTANEOUS WEEKLY
Qty: 2 ML | Refills: 0 | Status: ACTIVE | OUTPATIENT
Start: 2025-02-03 | End: 2025-02-07

## 2025-02-03 RX ORDER — SEMAGLUTIDE 0.5 MG/.5ML
0.5 INJECTION, SOLUTION SUBCUTANEOUS WEEKLY
Qty: 2 ML | Refills: 0 | Status: ACTIVE | OUTPATIENT
Start: 2025-03-06 | End: 2025-02-07

## 2025-02-03 RX ORDER — SEMAGLUTIDE 1 MG/.5ML
1 INJECTION, SOLUTION SUBCUTANEOUS WEEKLY
Qty: 2 ML | Refills: 0 | Status: ACTIVE | OUTPATIENT
Start: 2025-04-06 | End: 2025-02-07

## 2025-02-03 RX ORDER — SEMAGLUTIDE 1.7 MG/.75ML
1.7 INJECTION, SOLUTION SUBCUTANEOUS WEEKLY
Qty: 3 ML | Refills: 0 | Status: ACTIVE | OUTPATIENT
Start: 2025-05-07 | End: 2025-02-07

## 2025-02-03 NOTE — PROGRESS NOTES
Patient ID: Juan Pablo Saeed is a 44 y.o. male.    Chief Complaint: General Illness (Entered automatically based on patient selection in [employer]    The patient initiated a request through [employer] on 2/1/2025 for evaluation and management with a chief complaint of General Illness (Entered automatically based on patient selection in [employer].)     I evaluated the questionnaire submission on 02/03/2025.    [facility]    2/1/2025  8:44 PM CST - Filed by Patient   What do you need help with? Medication Request   Do you agree to participate in an E-Visit? Yes   If you have any of the following symptoms, please present to your local emergency room or call 911:  I acknowledge   Medication requests for narcotics will not be addressed via an E-Visit.  Please schedule an appointment. I acknowledge   Do you want to address a new or existing medication? I would like to start a new medication that I do not already take   What is the main issue you would like addressed today? I would like another try at [facility]. Couldn't get it before; want to try now.   What is the name of the medication that you would like to start? [facility]   Have you taken a similar medication in the past? No   Why are you requesting this particular medication? To help control my weight and other conditions    What medical condition is the  medication intended to treat? Diabetes   Provide any additional information you feel is important.    Please attach any relevant images or files    Are you able to take your vital signs? No         Encounter Diagnoses   Name Primary?    Morbid obesity with BMI of 50.0-59.9, adult Yes    Insulin resistance         No orders of the defined types were placed in this encounter.     Medications Ordered This Encounter   Medications    semaglutide, weight loss, (WEGOVY) 0.25 mg/0.5 mL PnIj     Sig: Inject 0.25 mg into the skin once a week.     Dispense:  2 mL     Refill:  0    semaglutide, weight loss,  (WEGOVY) 0.5 mg/0.5 mL PnIj     Sig: Inject 0.5 mg into the skin once a week.     Dispense:  2 mL     Refill:  0    semaglutide, weight loss, (WEGOVY) 1 mg/0.5 mL PnIj     Sig: Inject 1 mg into the skin once a week.     Dispense:  2 mL     Refill:  0    semaglutide, weight loss, (WEGOVY) 1.7 mg/0.75 mL PnIj     Sig: Inject 1.7 mg into the skin once a week.     Dispense:  3 mL     Refill:  0        Follow up in about 6 weeks (around 3/15/2025) for Obesity, IGT, with Montgomeryse Savage.      E-Visit Time Tracking:    Day 1 Time (in minutes): 10    Total Time (in minutes): 10

## 2025-02-05 ENCOUNTER — OFFICE VISIT (OUTPATIENT)
Dept: RHEUMATOLOGY | Facility: CLINIC | Age: 45
End: 2025-02-05
Payer: COMMERCIAL

## 2025-02-05 VITALS
HEART RATE: 66 BPM | WEIGHT: 315 LBS | HEIGHT: 72 IN | SYSTOLIC BLOOD PRESSURE: 126 MMHG | DIASTOLIC BLOOD PRESSURE: 79 MMHG | BODY MASS INDEX: 42.66 KG/M2

## 2025-02-05 DIAGNOSIS — M79.671 PAIN IN BOTH FEET: ICD-10-CM

## 2025-02-05 DIAGNOSIS — M79.672 PAIN IN BOTH FEET: ICD-10-CM

## 2025-02-05 DIAGNOSIS — R76.8 POSITIVE ANA (ANTINUCLEAR ANTIBODY): ICD-10-CM

## 2025-02-05 DIAGNOSIS — M79.642 PAIN IN BOTH HANDS: ICD-10-CM

## 2025-02-05 DIAGNOSIS — R20.2 PARESTHESIAS: ICD-10-CM

## 2025-02-05 DIAGNOSIS — M79.641 PAIN IN BOTH HANDS: ICD-10-CM

## 2025-02-05 PROCEDURE — 99214 OFFICE O/P EST MOD 30 MIN: CPT | Mod: S$GLB,,, | Performed by: STUDENT IN AN ORGANIZED HEALTH CARE EDUCATION/TRAINING PROGRAM

## 2025-02-05 PROCEDURE — 3008F BODY MASS INDEX DOCD: CPT | Mod: CPTII,S$GLB,, | Performed by: STUDENT IN AN ORGANIZED HEALTH CARE EDUCATION/TRAINING PROGRAM

## 2025-02-05 PROCEDURE — 3074F SYST BP LT 130 MM HG: CPT | Mod: CPTII,S$GLB,, | Performed by: STUDENT IN AN ORGANIZED HEALTH CARE EDUCATION/TRAINING PROGRAM

## 2025-02-05 PROCEDURE — 99999 PR PBB SHADOW E&M-EST. PATIENT-LVL III: CPT | Mod: PBBFAC,,, | Performed by: STUDENT IN AN ORGANIZED HEALTH CARE EDUCATION/TRAINING PROGRAM

## 2025-02-05 PROCEDURE — 3078F DIAST BP <80 MM HG: CPT | Mod: CPTII,S$GLB,, | Performed by: STUDENT IN AN ORGANIZED HEALTH CARE EDUCATION/TRAINING PROGRAM

## 2025-02-05 PROCEDURE — 1159F MED LIST DOCD IN RCRD: CPT | Mod: CPTII,S$GLB,, | Performed by: STUDENT IN AN ORGANIZED HEALTH CARE EDUCATION/TRAINING PROGRAM

## 2025-02-05 PROCEDURE — 4010F ACE/ARB THERAPY RXD/TAKEN: CPT | Mod: CPTII,S$GLB,, | Performed by: STUDENT IN AN ORGANIZED HEALTH CARE EDUCATION/TRAINING PROGRAM

## 2025-02-05 RX ORDER — PREGABALIN 75 MG/1
75 CAPSULE ORAL 2 TIMES DAILY
Qty: 60 CAPSULE | Refills: 5 | Status: SHIPPED | OUTPATIENT
Start: 2025-02-05 | End: 2025-08-06

## 2025-02-05 NOTE — PROGRESS NOTES
"       RHEUMATOLOGY OUTPATIENT CLINIC NOTE    2/5/2025    Attending Rheumatologist: Jennifer Porras  Primary Care Provider: Jordin Hampton MD   Physician Requesting Consultation: No referring provider defined for this encounter.  Chief Complaint/Reason For Consultation:  Positive KAYCE (antinuclear antibody)      Subjective:       HPI  Juan Pablo Saeed is a 44 y.o. Black or  male who comes for evaluation of positive KAYCE.     Interim history   -initial consult with me on 11/2024  -started lyrica 75 mg BID and he feels that it has helped his symptoms.   -PCP just started him on Wegovy but it was too expensive. He is using his brother's ozempic, has lost about 20 lbs.     Disease history     He reports pain in feet about 10 years ago, located in the midfoot and toes, pain is constant but worse with cold exposure. Has to keep socks all the time.   Ankle swelling at the end of the day. He reports pain in hands, located in thumbs, in between fingers,  Has not noted swelling. He is always massaging his fingers. Worse with cold exposure.   He denies any raynauds   He has seen neurology for this. He was told he has neuropathy. He reports that he had extensive workup done that was negative, except for prediabetes.   He has been on gabapentin 300 mg BID. He does not take it TID because it makes him sleepy   He noted nail pitting in left 2nd nail.     He has CKD of unknown etiology. Has seen nephrology in the past. Had proteinuria int he past but most recent microalbumin/creatinine ratio was normal.   He also saw rheumatologist in 2020 at . Per notes "he had a abdominal fat pad biopsy to look for amyloidosis. I cannot find the results, but the subsequent nephrology notes do not mention amyloidosis as a diagnosis"    No family history of psoriasis. No autoimmune conditions.     Works in plant operating heavy machine     Labs    11/2024  ACE, IL2 normal  PTH negative  B1, B6, folate, D levels " normal  SPEP with IF normal  FLC ratio normal  C3 normal  C4 49 (<44)      8/2024  KAYCE 1:80 homogeneous  Negative DSDNA, SSA, SSB, SM, SM/RNP  CBC unremarkable  CMP creatinine 1.6 stable GFR 54  TSH normal  CRP, ESR negative  RF negative  Uric acid 7.1    2020  CCP negative    Imaging  2/2024  XR foot: Mild narrowing with spurring the first MTP.     Review of Systems   Constitutional:  Negative for fever and unexpected weight change.   HENT:  Negative for mouth sores and trouble swallowing.    Eyes:  Negative for redness.   Respiratory:  Negative for cough and shortness of breath.    Cardiovascular:  Negative for chest pain.   Gastrointestinal:  Positive for constipation. Negative for diarrhea.   Genitourinary:  Negative for genital sores.   Integumentary:  Negative for rash.   Neurological:  Positive for headaches.   Hematological:  Does not bruise/bleed easily.        Chronic comorbid conditions affecting medical decision making today:  Past Medical History:   Diagnosis Date    Back pain     GERD (gastroesophageal reflux disease)     HTN (hypertension)     Mixed hyperlipidemia     Obesity      Past Surgical History:   Procedure Laterality Date    CIRCUMCISION  2010    COLONOSCOPY N/A 11/9/2023    Procedure: COLONOSCOPY- possible banding;  Surgeon: Ce Herrera MD;  Location: [facility];  Service: Colon and Rectal;  Laterality: N/A;     Family History   Problem Relation Name Age of Onset    Arthritis Mother      Cancer Mother          stomach    Diabetes Brother      Alcohol abuse Brother      Stroke Maternal Grandmother       Social History     Substance and Sexual Activity   Alcohol Use Yes    Comment: 1 x monthly     Social History     Tobacco Use   Smoking Status Former    Types: Cigars    Passive exposure: Never   Smokeless Tobacco Never     Social History     Substance and Sexual Activity   Drug Use Never       Current Outpatient Medications:     acetaminophen (TYLENOL) 325 MG tablet, Take 325 mg by  mouth every 6 (six) hours as needed for Pain., Disp: , Rfl:     atorvastatin (LIPITOR) 40 MG tablet, Take 1 tablet (40 mg total) by mouth once daily., Disp: 90 tablet, Rfl: 3    lisinopriL (PRINIVIL,ZESTRIL) 40 MG tablet, Take 1 tablet (40 mg total) by mouth once daily., Disp: 90 tablet, Rfl: 3    multivitamin with minerals tablet, Take 1 tablet by mouth., Disp: , Rfl:     pregabalin (LYRICA) 75 MG capsule, Take 1 capsule (75 mg total) by mouth 2 (two) times daily., Disp: 60 capsule, Rfl: 5    semaglutide, weight loss, (WEGOVY) 0.25 mg/0.5 mL PnIj, Inject 0.25 mg into the skin once a week. (Patient not taking: Reported on 2/5/2025), Disp: 2 mL, Rfl: 0    [START ON 3/6/2025] semaglutide, weight loss, (WEGOVY) 0.5 mg/0.5 mL PnIj, Inject 0.5 mg into the skin once a week. (Patient not taking: Reported on 2/5/2025), Disp: 2 mL, Rfl: 0    [START ON 4/6/2025] semaglutide, weight loss, (WEGOVY) 1 mg/0.5 mL PnIj, Inject 1 mg into the skin once a week. (Patient not taking: Reported on 2/5/2025), Disp: 2 mL, Rfl: 0    [START ON 5/7/2025] semaglutide, weight loss, (WEGOVY) 1.7 mg/0.75 mL PnIj, Inject 1.7 mg into the skin once a week. (Patient not taking: Reported on 2/5/2025), Disp: 3 mL, Rfl: 0     Objective:         Vitals:    02/05/25 0810   BP: 126/79   Pulse: 66       Physical Exam   Constitutional: He is oriented to person, place, and time. He appears well-developed and obese.   HENT:   Head: Normocephalic.   Pulmonary/Chest: Effort normal.   Abdominal: Soft.   Musculoskeletal:      Cervical back: Neck supple.      Comments: Shoulders: FROM; no synovitis;  Elbows: FROM; no synovitis; no tophi or nodules  Wrists: FROM; no synovitis;    MCPs: FROM; no synovitis;   PIPs:FROM; no synovitis;   DIPs: FROM; no synovitis;   HIPS: FROM  Knees: FROM; no synovitis; no instability  Ankles: FROM: no synovitis   Toes: no tenderness on palpation.     Neurological: He is alert and oriented to person, place, and time.   Skin: No rash  "noted.   No Heliotrope rash  No Gottron papules  No sclerodactyly   No Raynaud's  No Petechiae  No discoid lesions  No alopecia  Normal Capillaroscopy  Nail pitting in left 2nd digit    Vitals reviewed.      Reviewed old and all outside pertinent medical records available.    All lab results personally reviewed and interpreted by me.  Lab Results   Component Value Date    WBC 5.38 08/20/2024    HGB 14.7 08/20/2024    HCT 46.5 08/20/2024    MCV 87 08/20/2024    MCH 27.4 08/20/2024    MCHC 31.6 (L) 08/20/2024    RDW 13.6 08/20/2024     08/20/2024    MPV 10.3 08/20/2024       Lab Results   Component Value Date     08/20/2024    K 4.2 08/20/2024     08/20/2024    CO2 23 08/20/2024    GLU 60 (L) 08/20/2024    BUN 15 08/20/2024    CALCIUM 9.7 08/20/2024    PROT 7.6 08/20/2024    ALBUMIN 3.9 08/20/2024    BILITOT 0.8 08/20/2024    AST 26 08/20/2024    ALKPHOS 58 08/20/2024    ALT 28 08/20/2024       Lab Results   Component Value Date    COLORU Yellow 05/15/2023    APPEARANCEUA Clear 01/25/2018    SPECGRAV 1.020 05/15/2023    PHUR 5 05/15/2023    PROTEINUA Negative 01/25/2018    KETONESU negative 05/15/2023    LEUKOCYTESUR Negative 01/25/2018    NITRITE negative 05/15/2023    UROBILINOGEN normal 05/15/2023       Lab Results   Component Value Date    CRP 5.2 08/20/2024       Lab Results   Component Value Date    RF <13.0 08/20/2024    SEDRATE 26 (H) 08/20/2024       No components found for: "25OHVITDTOT", "45ERSMOZ1", "83CBIXPZ4", "METHODNOTE"    Lab Results   Component Value Date    URICACID 7.1 (H) 08/20/2024       Lab Results   Component Value Date    HEPBSAG Negative 08/22/2016    HEPCAB Negative 08/22/2016       Imaging:  All imaging reviewed and independently interpreted by me.     ASSESSMENT / PLAN:     Juan Pablo Saeed is a 44 y.o. Black or  male with:    1. Positive KAYCE (antinuclear antibody) (Primary)  -KAYCE 1:80 homogeneous with negative sub-serologies.   -his symptoms do not " appear to be related to positive KAYCE at this moment     2. Pain in both feet  3. Pain in both hands  4. Paresthesias  -His main symptoms are long standing history of paresthesias involving hands and feet. Pain is not consistent with inflammatory arthritis. It appears he has had extensive workup many years ago by neurologist. He may have had an abdominal fat pad biopsy to rule out amyloidosis. Vitamin levels are normal   -continue lyrica 75 mg BID.  reviewed     Follow up in about 6 months (around 8/5/2025).    Method of contact with patient concerns: [facility]    Disclaimer:  This note is prepared using voice recognition software and as such is likely to have errors and has not been proof read. Please contact me for questions.     Jennifer Porras M.D.  Rheumatology  [facility]

## 2025-02-07 ENCOUNTER — PATIENT MESSAGE (OUTPATIENT)
Dept: INTERNAL MEDICINE | Facility: CLINIC | Age: 45
End: 2025-02-07
Payer: COMMERCIAL

## 2025-02-07 DIAGNOSIS — E88.819 INSULIN RESISTANCE: ICD-10-CM

## 2025-02-07 DIAGNOSIS — E66.01 MORBID OBESITY WITH BMI OF 50.0-59.9, ADULT: Primary | ICD-10-CM

## 2025-02-07 RX ORDER — TIRZEPATIDE 5 MG/.5ML
5 INJECTION, SOLUTION SUBCUTANEOUS
Qty: 2 ML | Refills: 0 | Status: ACTIVE | OUTPATIENT
Start: 2025-03-10 | End: 2025-04-09

## 2025-02-07 RX ORDER — TIRZEPATIDE 2.5 MG/.5ML
2.5 INJECTION, SOLUTION SUBCUTANEOUS
Qty: 2 ML | Refills: 0 | Status: ACTIVE | OUTPATIENT
Start: 2025-02-07 | End: 2025-03-09

## 2025-03-07 DIAGNOSIS — E78.5 HYPERLIPIDEMIA, UNSPECIFIED HYPERLIPIDEMIA TYPE: ICD-10-CM

## 2025-03-07 DIAGNOSIS — I10 ESSENTIAL HYPERTENSION: ICD-10-CM

## 2025-03-07 RX ORDER — ATORVASTATIN CALCIUM 40 MG/1
40 TABLET, FILM COATED ORAL DAILY
Qty: 90 TABLET | Refills: 3 | Status: SHIPPED | OUTPATIENT
Start: 2025-03-07

## 2025-03-07 RX ORDER — LISINOPRIL 40 MG/1
40 TABLET ORAL DAILY
Qty: 90 TABLET | Refills: 3 | Status: SHIPPED | OUTPATIENT
Start: 2025-03-07

## 2025-03-07 NOTE — TELEPHONE ENCOUNTER
No care due was identified.  [facility] Embedded Care Due Messages. Reference number: [id number].   3/07/2025 7:48:20 AM CST

## 2025-03-07 NOTE — TELEPHONE ENCOUNTER
Care Due:                  Date            Visit Type   Department     Provider  --------------------------------------------------------------------------------                                EP -                              PRIMARY      [facility]  Last Visit: 08-      CARE ([facility])   ANNA Hampton  Next Visit: None Scheduled  None         None Found                                                            Last  Test          Frequency    Reason                     Performed    Due Date  --------------------------------------------------------------------------------    Office Visit  15 months..  atorvastatin, lisinopriL,   08-   10-                             tirzepatide..............    HBA1C.......  6 months...  tirzepatide..............  08- 02-    [facility] Embedded Care Due Messages. Reference number: [id number].   3/07/2025 5:37:29 AM CST

## 2025-03-10 ENCOUNTER — E-VISIT (OUTPATIENT)
Dept: INTERNAL MEDICINE | Facility: CLINIC | Age: 45
End: 2025-03-10
Payer: COMMERCIAL

## 2025-03-10 DIAGNOSIS — L70.0 ACNE VULGARIS: Primary | ICD-10-CM

## 2025-03-10 PROCEDURE — 99421 OL DIG E/M SVC 5-10 MIN: CPT | Mod: ,,, | Performed by: INTERNAL MEDICINE

## 2025-03-10 RX ORDER — DOXYCYCLINE HYCLATE 100 MG
100 TABLET ORAL EVERY 12 HOURS
Qty: 60 TABLET | Refills: 0 | Status: SHIPPED | OUTPATIENT
Start: 2025-03-10 | End: 2025-04-09

## 2025-03-10 NOTE — PROGRESS NOTES
Patient ID: Juan Pablo Saeed is a 44 y.o. male.    Chief Complaint: General Illness (Entered automatically based on patient selection in [employer].)    The patient initiated a request through [employer] on 3/10/2025 for evaluation and management with a chief complaint of General Illness (Entered automatically based on patient selection in [employer].)     I evaluated the questionnaire submission on 03/10/25.    [facility]-Skin Hair Nails    3/10/2025  5:17 AM CDT - Filed by Patient   What do you need help with? Skin   What concern do you have about your skin? Other Concern   Do you agree to participate in an E-Visit? Yes   If you have any of the following symptoms, please go to the nearest emergency room or call 911: I acknowledge   What is the main issue you would like addressed today? Acne around my mouth   Please describe your symptoms. Painful pimples with white fluid inside   Where is your problem located? Around mouth   On a scale of 1-10, where 10 is the worst you can imagine, how severe are your symptoms? (range: 1 - 10) 6   Have you had these symptoms before? Yes   How long have you had these symptoms? More than a month   What helps with your symptoms? Popping and cleaning them   What makes your symptoms feel worse? Not popping them   Are your symptoms related to a condition you currently have? Not sure   Please describe any probable cause for your symptoms. Touching my face too much or work related   Provide any additional information you feel is important. Face really oirly in the morning   Please attach any relevant images or files    Are you able to take your vital signs? No         Encounter Diagnosis   Name Primary?    Acne vulgaris Yes        No orders of the defined types were placed in this encounter.     Medications Ordered This Encounter   Medications    doxycycline (VIBRA-TABS) 100 MG tablet     Sig: Take 1 tablet (100 mg total) by mouth every 12 (twelve) hours.     Dispense:  60 tablet      Refill:  0      Prevention & Treatment:  ? Wash Your Beard Daily - Use a gentle cleanser to remove dirt and oil.  ? Exfoliate 1-2 Times a Week - Helps clear dead skin and prevent ingrowns.  ? Use a Beard-Safe Moisturizer - Keeps skin hydrated without clogging pores.  ? Trim, Dont Shave Too Close - Reduces irritation and ingrown hairs.  ? Try Tea Tree Oil - A natural antibacterial that fights breakouts.  ? Switch to Non-Comedogenic Beard Products - Avoid heavy oils that may clog pores.        Follow up if symptoms worsen or fail to improve.      E-Visit Time Tracking:    Day 1 Time (in minutes): 10    Total Time (in minutes): 10

## 2025-06-18 ENCOUNTER — TELEPHONE (OUTPATIENT)
Dept: INTERNAL MEDICINE | Facility: CLINIC | Age: 45
End: 2025-06-18
Payer: COMMERCIAL

## 2025-06-18 ENCOUNTER — OFFICE VISIT (OUTPATIENT)
Dept: INTERNAL MEDICINE | Facility: CLINIC | Age: 45
End: 2025-06-18
Payer: COMMERCIAL

## 2025-06-18 ENCOUNTER — RESULTS FOLLOW-UP (OUTPATIENT)
Dept: INTERNAL MEDICINE | Facility: CLINIC | Age: 45
End: 2025-06-18

## 2025-06-18 ENCOUNTER — HOSPITAL ENCOUNTER (OUTPATIENT)
Dept: RADIOLOGY | Facility: HOSPITAL | Age: 45
Discharge: HOME | End: 2025-06-18
Attending: NURSE PRACTITIONER
Payer: COMMERCIAL

## 2025-06-18 VITALS
DIASTOLIC BLOOD PRESSURE: 84 MMHG | HEIGHT: 73 IN | OXYGEN SATURATION: 98 % | SYSTOLIC BLOOD PRESSURE: 112 MMHG | BODY MASS INDEX: 41.75 KG/M2 | WEIGHT: 315 LBS | TEMPERATURE: 98 F | HEART RATE: 78 BPM

## 2025-06-18 DIAGNOSIS — R76.8 POSITIVE ANA (ANTINUCLEAR ANTIBODY): ICD-10-CM

## 2025-06-18 DIAGNOSIS — R61 EXCESSIVE SWEATING: ICD-10-CM

## 2025-06-18 DIAGNOSIS — E88.819 INSULIN RESISTANCE: ICD-10-CM

## 2025-06-18 DIAGNOSIS — M79.672 PAIN IN BOTH FEET: ICD-10-CM

## 2025-06-18 DIAGNOSIS — Z82.49 FAMILY HISTORY OF CARDIAC DISORDER IN FATHER: ICD-10-CM

## 2025-06-18 DIAGNOSIS — Z12.5 SCREENING FOR MALIGNANT NEOPLASM OF PROSTATE: ICD-10-CM

## 2025-06-18 DIAGNOSIS — I10 ESSENTIAL HYPERTENSION: ICD-10-CM

## 2025-06-18 DIAGNOSIS — R07.9 CHEST PAIN, UNSPECIFIED TYPE: Primary | ICD-10-CM

## 2025-06-18 DIAGNOSIS — M79.671 PAIN IN BOTH FEET: ICD-10-CM

## 2025-06-18 DIAGNOSIS — R07.9 CHEST PAIN, UNSPECIFIED TYPE: ICD-10-CM

## 2025-06-18 DIAGNOSIS — M79.671 PAIN IN RIGHT FOOT: ICD-10-CM

## 2025-06-18 PROCEDURE — 1160F RVW MEDS BY RX/DR IN RCRD: CPT | Mod: CPTII,S$GLB,, | Performed by: NURSE PRACTITIONER

## 2025-06-18 PROCEDURE — 93005 ELECTROCARDIOGRAM TRACING: CPT | Mod: S$GLB,,, | Performed by: NURSE PRACTITIONER

## 2025-06-18 PROCEDURE — 3074F SYST BP LT 130 MM HG: CPT | Mod: CPTII,S$GLB,, | Performed by: NURSE PRACTITIONER

## 2025-06-18 PROCEDURE — 4010F ACE/ARB THERAPY RXD/TAKEN: CPT | Mod: CPTII,S$GLB,, | Performed by: NURSE PRACTITIONER

## 2025-06-18 PROCEDURE — 1159F MED LIST DOCD IN RCRD: CPT | Mod: CPTII,S$GLB,, | Performed by: NURSE PRACTITIONER

## 2025-06-18 PROCEDURE — 99214 OFFICE O/P EST MOD 30 MIN: CPT | Mod: S$GLB,,, | Performed by: NURSE PRACTITIONER

## 2025-06-18 PROCEDURE — 3079F DIAST BP 80-89 MM HG: CPT | Mod: CPTII,S$GLB,, | Performed by: NURSE PRACTITIONER

## 2025-06-18 PROCEDURE — 71046 X-RAY EXAM CHEST 2 VIEWS: CPT | Mod: 26,,, | Performed by: STUDENT IN AN ORGANIZED HEALTH CARE EDUCATION/TRAINING PROGRAM

## 2025-06-18 PROCEDURE — 93010 ELECTROCARDIOGRAM REPORT: CPT | Mod: S$GLB,,, | Performed by: INTERNAL MEDICINE

## 2025-06-18 PROCEDURE — 99999 PR PBB SHADOW E&M-EST. PATIENT-LVL IV: CPT | Mod: PBBFAC,,, | Performed by: NURSE PRACTITIONER

## 2025-06-18 PROCEDURE — 71046 X-RAY EXAM CHEST 2 VIEWS: CPT | Mod: TC,FY,PO

## 2025-06-18 PROCEDURE — 3008F BODY MASS INDEX DOCD: CPT | Mod: CPTII,S$GLB,, | Performed by: NURSE PRACTITIONER

## 2025-06-18 PROCEDURE — G2211 COMPLEX E/M VISIT ADD ON: HCPCS | Mod: S$GLB,,, | Performed by: NURSE PRACTITIONER

## 2025-06-18 RX ORDER — TIRZEPATIDE 2.5 MG/0.1
2.5 SYRINGE (ML) SUBCUTANEOUS WEEKLY
COMMUNITY

## 2025-06-18 NOTE — TELEPHONE ENCOUNTER
Called pt and told him due to having chest pains he will have to come into the office. Pt scheduled.

## 2025-06-18 NOTE — TELEPHONE ENCOUNTER
----- Message from Mary Savage NP sent at 6/18/2025 11:55 AM CDT -----  Pt requested e-visit for chest pain. He needs a clinic visit or to report to [facility] for evaluation

## 2025-06-18 NOTE — PROGRESS NOTES
"Subjective:       Patient ID: Juan Pablo Saeed is a 44 y.o. male.  CHIEF COMPLAINT:  - Mr. Saeed presents with recurring chest pain and concerns about heart health due to family history of heart failure.    HPI:  Mr. Saeed reports recurring chest pain described as a pressure sensation on his chest, occurring every other day for an unspecified duration. The pain sometimes radiates into his shoulder and underneath his shoulder blade. It typically occurs around the time he is going to work or while at work, suggesting a possible stress-related component, but has also happened a few times on weekends. The pain is alleviated by rubbing the area or taking Tylenol. He is concerned about these symptoms due to his father's history of heart attack and subsequent death at age 67.    In addition to the chest pain, he reports excessive sweating, particularly at night between 11 PM and 12 AM, even when the house is cold. This sweating also sometimes occurs while he is at work. The sweating is severe enough that he now sleeps with a large towel to manage the moisture.    He is currently taking medication for cholesterol and blood pressure, as well as Lyrica. He has also recently started taking Mounjaro (tirzepatide) for weight loss, administered as 5 units via a compounded formulation from a specialty pharmacy. Prior to Mounjaro, he was on Ozempic and lost significant weight, going from around 375 lbs to approximately 340 lbs.    He mentions having similar chest pain episodes last year. He denies any new physical activities or recent heavy lifting that might explain the chest discomfort. He also reports having undergone a stress test a few years ago.    He denies shortness of breath, nausea, vomiting, reflux symptoms, heartburn, and indigestion. He denies pain intensification with palpation of the chest area.        /84 (Patient Position: Sitting)   Pulse 78   Temp 97.5 °F (36.4 °C) (Tympanic)   Ht 6' 1" (1.854 m)   Wt " (!) 159.5 kg (351 lb 10.1 oz)   SpO2 98%   BMI 46.39 kg/m²     Review of Systems   Constitutional:  Negative for activity change, appetite change, chills, diaphoresis, fatigue, fever and unexpected weight change.   HENT: Negative.     Eyes: Negative.    Respiratory:  Positive for chest tightness. Negative for apnea, shortness of breath and stridor.    Cardiovascular:  Negative for chest pain, palpitations and leg swelling.   Gastrointestinal: Negative.    Endocrine: Negative.    Genitourinary: Negative.    Musculoskeletal:  Negative for arthralgias and myalgias.   Skin:  Negative for color change, pallor, rash and wound.   Allergic/Immunologic: Negative.    Neurological:  Negative for dizziness, facial asymmetry, light-headedness and headaches.   Hematological:  Negative for adenopathy.   Psychiatric/Behavioral:  Negative for agitation and behavioral problems.        Objective:      Physical Exam  Vitals reviewed.   Constitutional:       General: He is not in acute distress.     Appearance: Normal appearance. He is obese.   HENT:      Head: Normocephalic.      Right Ear: Tympanic membrane normal.      Left Ear: Tympanic membrane normal.      Nose: Nose normal.   Eyes:      Conjunctiva/sclera: Conjunctivae normal.      Pupils: Pupils are equal, round, and reactive to light.   Cardiovascular:      Rate and Rhythm: Normal rate.      Pulses: Normal pulses.   Pulmonary:      Effort: Pulmonary effort is normal.   Abdominal:      General: Abdomen is flat.      Palpations: Abdomen is soft.   Musculoskeletal:         General: Normal range of motion.      Cervical back: Normal range of motion.   Skin:     General: Skin is warm.      Capillary Refill: Capillary refill takes less than 2 seconds.   Neurological:      Mental Status: He is alert and oriented to person, place, and time. Mental status is at baseline.   Psychiatric:         Mood and Affect: Mood normal.         Assessment and Plan        Juan Pablo was seen today for  chest pain.    Diagnoses and all orders for this visit:    Chest pain, unspecified type  -     IN OFFICE EKG 12-LEAD (to Muse)  -     X-Ray Chest PA And Lateral; Future  -     Ambulatory referral/consult to Cardiology; Future    Essential hypertension  -     CBC Auto Differential; Future  -     Comprehensive Metabolic Panel; Future  -     Lipid Panel; Future    Insulin resistance  -     TSH; Future  -     Hemoglobin A1C; Future    Excessive sweating    Screening for malignant neoplasm of prostate  -     PSA, Screening; Future    Family history of cardiac disorder in father    Pain in right foot  -     Ambulatory Referral/Consult to Physical Therapy    Pain in both feet  -     Ambulatory Referral/Consult to Physical Therapy    Positive KAYCE (antinuclear antibody)  -     Ambulatory Referral/Consult to Physical Therapy    BMI 45.0-49.9, adult      There are no Patient Instructions on file for this visit.      1. Chest pain, unspecified type    2. Essential hypertension    3. Insulin resistance    4. Excessive sweating    5. Screening for malignant neoplasm of prostate    6. Family history of cardiac disorder in father    7. Pain in right foot    8. Pain in both feet    9. Positive KAYCE (antinuclear antibody)    10. BMI 45.0-49.9, adult        Assessment & Plan    CHEST PAIN (INTERCOSTAL PAIN):  - Evaluated chest pain, considering multiple risk factors including  ethnicity, family history of heart problems, and controlled hypertension.  - Mr. Saeed reports chest pain that feels like pressure with a finger on the chest, radiating to the shoulder blade and occurring every other day, often related to work stress.  - Pain improves with Tylenol or direct pressure application.  - Ordered EKG and chest XR to rule out serious cardiac issues before considering muscle pain.  - Referred to Cardiology for comprehensive evaluation.  - Advised follow up with Cardiology appointment within the next 2 weeks, prior to trip  to [place].    GENERALIZED HYPERHIDROSIS:  - Noted that the patient experiences sweating at night, usually between 11 PM and 12 AM, even when the house is cold.  - Sweating also occurs at work and happens like clockwork, requiring the patient to sleep with a towel to dry sweat.  - Considered night sweats as part of overall clinical picture during the assessment of chest pain and heart health.    ESSENTIAL (PRIMARY) HYPERTENSION:  - Confirmed that the patient continues to take blood pressure medication without skipping doses.  - Noted controlled hypertension as a risk factor during assessment of heart health and chest pain.    HYPERLIPIDEMIA:  - Verified that the patient continues to take cholesterol medication without skipping doses.  - Ordered fasting lab work.    WRIST PAIN:  - Referred to physical therapy for wrist pain, including heated wax therapy and therapeutic massages per pt request    FAMILY HISTORY OF HEART DISEASE:  - Documented that father passed away from heart failure.  - This family history was considered as a significant risk factor during evaluation of the patient's chest pain.     FOLLOW UP:  - Dr. Hampton in 3 months  - cardiology referral          This note was generated with the assistance of ambient listening technology. Verbal consent was obtained by the patient and accompanying visitor(s) for the recording of patient appointment to facilitate this note. I attest to having reviewed and edited the generated note for accuracy, though some syntax or spelling errors may persist. Please contact the author of this note for any clarification.

## 2025-06-19 ENCOUNTER — OFFICE VISIT (OUTPATIENT)
Dept: CARDIOLOGY | Facility: CLINIC | Age: 45
End: 2025-06-19
Payer: COMMERCIAL

## 2025-06-19 ENCOUNTER — LAB VISIT (OUTPATIENT)
Dept: LAB | Facility: HOSPITAL | Age: 45
End: 2025-06-19
Payer: COMMERCIAL

## 2025-06-19 VITALS
HEIGHT: 72 IN | DIASTOLIC BLOOD PRESSURE: 70 MMHG | HEART RATE: 100 BPM | SYSTOLIC BLOOD PRESSURE: 108 MMHG | OXYGEN SATURATION: 98 % | BODY MASS INDEX: 42.66 KG/M2 | WEIGHT: 315 LBS

## 2025-06-19 DIAGNOSIS — E78.2 MIXED HYPERLIPIDEMIA: ICD-10-CM

## 2025-06-19 DIAGNOSIS — R07.9 CHEST PAIN, UNSPECIFIED TYPE: ICD-10-CM

## 2025-06-19 DIAGNOSIS — I10 ESSENTIAL HYPERTENSION: Primary | ICD-10-CM

## 2025-06-19 DIAGNOSIS — R00.0 TACHYCARDIA: ICD-10-CM

## 2025-06-19 DIAGNOSIS — I10 ESSENTIAL HYPERTENSION: ICD-10-CM

## 2025-06-19 LAB
OHS QRS DURATION: 84 MS
OHS QTC CALCULATION: 432 MS

## 2025-06-19 PROCEDURE — 3044F HG A1C LEVEL LT 7.0%: CPT | Mod: CPTII,S$GLB,,

## 2025-06-19 PROCEDURE — 99214 OFFICE O/P EST MOD 30 MIN: CPT | Mod: S$GLB,,,

## 2025-06-19 PROCEDURE — 4010F ACE/ARB THERAPY RXD/TAKEN: CPT | Mod: CPTII,S$GLB,,

## 2025-06-19 PROCEDURE — 3078F DIAST BP <80 MM HG: CPT | Mod: CPTII,S$GLB,,

## 2025-06-19 PROCEDURE — 1159F MED LIST DOCD IN RCRD: CPT | Mod: CPTII,S$GLB,,

## 2025-06-19 PROCEDURE — 3008F BODY MASS INDEX DOCD: CPT | Mod: CPTII,S$GLB,,

## 2025-06-19 PROCEDURE — 99999 PR PBB SHADOW E&M-EST. PATIENT-LVL III: CPT | Mod: PBBFAC,,,

## 2025-06-19 PROCEDURE — 83695 ASSAY OF LIPOPROTEIN(A): CPT

## 2025-06-19 PROCEDURE — 36415 COLL VENOUS BLD VENIPUNCTURE: CPT | Mod: PO

## 2025-06-19 PROCEDURE — 1160F RVW MEDS BY RX/DR IN RCRD: CPT | Mod: CPTII,S$GLB,,

## 2025-06-19 PROCEDURE — 3074F SYST BP LT 130 MM HG: CPT | Mod: CPTII,S$GLB,,

## 2025-06-19 NOTE — PROGRESS NOTES
Subjective:   Patient ID:  Juan Pablo Saeed is a 44 y.o. male who presents for evaluation of No chief complaint on file.      HPI 44-year-old male whose current medical conditions include HTN, HLP, obesity, CKD, GERD, MOSHE, pre DM, chronic chest pain previously followed by Dr. Orozco in [facility] here today for CV follow-up recently seen by PCP complaining of recurrent chest pains radiating to his shoulder does admit to stress at work which could be contributing, diaphoresis.  Patient is planning to go out of the country soon, noted to have complaints of chest pain on off for years. States his chest pain feels like a pressure pushing in the middle of his chest. Occ noticed edema, tight boots when at work. 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      6/25'    Denies tobacco abuse  ETOH rare     Family history: dad- heart disease (?cardiac arrest) in early 60s, ETOH use    Stress echo 3/20' negative for ischemia, normal EF   Lab Results   Component Value Date    HGBA1C 5.3 06/18/2025       Past Medical History:   Diagnosis Date    Back pain     GERD (gastroesophageal reflux disease)     HTN (hypertension)     Mixed hyperlipidemia     Obesity        Past Surgical History:   Procedure Laterality Date    CIRCUMCISION  2010    COLONOSCOPY N/A 11/9/2023    Procedure: COLONOSCOPY- possible banding;  Surgeon: Ce Herrera MD;  Location: [facility];  Service: Colon and Rectal;  Laterality: N/A;       Social History[1]    Family History   Problem Relation Name Age of Onset    Arthritis Mother      Cancer Mother          stomach    Heart failure Father      Diabetes Brother      Alcohol abuse Brother      Stroke Maternal Grandmother         Medications Ordered Prior to Encounter[2]   Wt Readings from Last 3 Encounters:   06/19/25 (!) 157.2 kg (346 lb 9 oz)   06/18/25 (!) 159.5 kg (351 lb 10.1 oz)   02/05/25 (!) 158.9 kg (350 lb 5 oz)     Temp Readings from Last 3 Encounters:   06/18/25 97.5 °F (36.4 °C) (Tympanic)    11/07/24 99 °F (37.2 °C) (Oral)   08/20/24 98 °F (36.7 °C) (Tympanic)     BP Readings from Last 3 Encounters:   06/19/25 108/70   06/18/25 112/84   02/05/25 126/79     Pulse Readings from Last 3 Encounters:   06/19/25 100   06/18/25 78   02/05/25 66        Review of Systems   Constitutional: Positive for malaise/fatigue.   HENT: Negative.     Eyes: Negative.    Cardiovascular:  Positive for chest pain and leg swelling.   Respiratory: Negative.     Skin: Negative.    Musculoskeletal: Negative.    Gastrointestinal: Negative.    Genitourinary: Negative.    Neurological:  Positive for weakness.   Psychiatric/Behavioral: Negative.         Objective:   Physical Exam  Vitals and nursing note reviewed.   Constitutional:       Appearance: Normal appearance. He is obese.   HENT:      Head: Normocephalic and atraumatic.   Eyes:      General:         Right eye: No discharge.         Left eye: No discharge.      Pupils: Pupils are equal, round, and reactive to light.   Cardiovascular:      Rate and Rhythm: Normal rate and regular rhythm.      Heart sounds: S1 normal and S2 normal. No murmur heard.     No friction rub.   Pulmonary:      Effort: Pulmonary effort is normal. No respiratory distress.      Breath sounds: Normal breath sounds. No rales.   Abdominal:      Palpations: Abdomen is soft.      Tenderness: There is no abdominal tenderness.   Musculoskeletal:      Cervical back: Neck supple.      Right lower leg: No edema.      Left lower leg: No edema.   Skin:     General: Skin is warm and dry.   Neurological:      General: No focal deficit present.      Mental Status: He is alert and oriented to person, place, and time.   Psychiatric:         Mood and Affect: Mood normal.         Behavior: Behavior normal.         Thought Content: Thought content normal.         Lab Results   Component Value Date    CHOL 177 06/18/2025    CHOL 179 08/20/2024    CHOL 216 (H) 08/08/2023     Lab Results   Component Value Date    HDL 35 (L)  "06/18/2025    HDL 37 (L) 08/20/2024    HDL 35 (L) 08/08/2023     Lab Results   Component Value Date    LDLCALC 103.8 06/18/2025    LDLCALC 103.4 08/20/2024    LDLCALC 151.0 08/08/2023     Lab Results   Component Value Date    TRIG 191 (H) 06/18/2025    TRIG 193 (H) 08/20/2024    TRIG 150 08/08/2023     Lab Results   Component Value Date    CHOLHDL 19.8 (L) 06/18/2025    CHOLHDL 20.7 08/20/2024    CHOLHDL 16.2 (L) 08/08/2023       Chemistry        Component Value Date/Time     06/18/2025 1414     08/20/2024 1045    K 4.1 06/18/2025 1414    K 4.2 08/20/2024 1045     06/18/2025 1414     08/20/2024 1045    CO2 23 06/18/2025 1414    CO2 23 08/20/2024 1045    BUN 18 06/18/2025 1414    CREATININE 1.7 (H) 06/18/2025 1414    GLU 68 (L) 06/18/2025 1414    GLU 60 (L) 08/20/2024 1045        Component Value Date/Time    CALCIUM 9.7 06/18/2025 1414    CALCIUM 9.7 08/20/2024 1045    ALKPHOS 59 06/18/2025 1414    ALKPHOS 58 08/20/2024 1045    AST 28 06/18/2025 1414    AST 26 08/20/2024 1045    ALT 28 06/18/2025 1414    ALT 28 08/20/2024 1045    BILITOT 0.7 06/18/2025 1414    BILITOT 0.8 08/20/2024 1045    ESTGFRAFRICA >60.0 04/18/2022 1432    EGFRNONAA 52.4 (A) 04/18/2022 1432          Lab Results   Component Value Date    TSH 1.832 06/18/2025     No results found for: "INR", "PROTIME"  @RESUFAST(WBC,HGB,HCT,MCV,PLT)  @LABRCNTIP(BNP,BNPTRIAGEBLO)@  Estimated Creatinine Clearance: 84.9 mL/min (A) (based on SCr of 1.7 mg/dL (H)).     No results found for this or any previous visit.     No results found for this or any previous visit.     Assessment:      1. Essential hypertension    2. Chest pain, unspecified type    3. Mixed hyperlipidemia    4. Tachycardia        Plan:   Essential hypertension  -     Echo; Future  -     Exercise Stress - EKG; Future  -     Lipoprotein A (LPA); Future; Expected date: 06/19/2025  -     Holter monitor - 48 hour; Future    Chest pain, unspecified type  -     Ambulatory " referral/consult to Cardiology  -     Echo; Future  -     Exercise Stress - EKG; Future  -     Lipoprotein A (LPA); Future; Expected date: 06/19/2025  -     Holter monitor - 48 hour; Future    Mixed hyperlipidemia  -     Lipoprotein A (LPA); Future; Expected date: 06/19/2025    Tachycardia  -     Holter monitor - 48 hour; Future      Lisinopril, low na diet, profile BP- HTN  RF modifications  Low na low fat diet  Daily exercise as tolerated    Lab today  Holter, echo, exercise stress  RTC as scheduled or sooner if needed    Courtney Guillot, FNP-C [facility], Cardiology         [1]   Social History  Tobacco Use    Smoking status: Former     Types: Cigars     Passive exposure: Past    Smokeless tobacco: Never   Substance Use Topics    Alcohol use: Yes     Comment: 1 x monthly    Drug use: Never   [2]   Current Outpatient Medications on File Prior to Visit   Medication Sig Dispense Refill    acetaminophen (TYLENOL) 325 MG tablet Take 325 mg by mouth every 6 (six) hours as needed for Pain.      atorvastatin (LIPITOR) 40 MG tablet Take 1 tablet (40 mg total) by mouth once daily. 90 tablet 3    lisinopriL (PRINIVIL,ZESTRIL) 40 MG tablet Take 1 tablet (40 mg total) by mouth once daily. 90 tablet 3    multivitamin with minerals tablet Take 1 tablet by mouth.      pregabalin (LYRICA) 75 MG capsule Take 1 capsule (75 mg total) by mouth 2 (two) times daily. 60 capsule 5    tirzepatide 2.5 mg/0.1 mL Syrg Inject 2.5 mg into the skin once a week.       No current facility-administered medications on file prior to visit.

## 2025-06-23 DIAGNOSIS — M79.642 PAIN IN BOTH HANDS: Primary | ICD-10-CM

## 2025-06-23 DIAGNOSIS — M79.641 PAIN IN BOTH HANDS: Primary | ICD-10-CM

## 2025-06-23 LAB — W LP(A): 178 NMOL/L

## 2025-06-25 ENCOUNTER — CLINICAL SUPPORT (OUTPATIENT)
Dept: REHABILITATION | Facility: HOSPITAL | Age: 45
End: 2025-06-25
Payer: COMMERCIAL

## 2025-06-25 DIAGNOSIS — R29.898 DECREASED GRIP STRENGTH OF RIGHT HAND: ICD-10-CM

## 2025-06-25 DIAGNOSIS — R20.2 PARESTHESIA OF BOTH HANDS: ICD-10-CM

## 2025-06-25 DIAGNOSIS — M79.642 PAIN IN BOTH HANDS: Primary | ICD-10-CM

## 2025-06-25 DIAGNOSIS — M79.641 PAIN IN BOTH HANDS: Primary | ICD-10-CM

## 2025-06-25 PROCEDURE — 97110 THERAPEUTIC EXERCISES: CPT | Mod: PN

## 2025-06-25 PROCEDURE — 97165 OT EVAL LOW COMPLEX 30 MIN: CPT | Mod: PN

## 2025-06-27 PROBLEM — M79.641 PAIN IN BOTH HANDS: Status: RESOLVED | Noted: 2023-02-02 | Resolved: 2025-06-27

## 2025-06-27 PROBLEM — R20.2 PARESTHESIA OF BOTH HANDS: Status: ACTIVE | Noted: 2025-06-27

## 2025-06-27 PROBLEM — M79.642 PAIN IN BOTH HANDS: Status: RESOLVED | Noted: 2023-02-02 | Resolved: 2025-06-27

## 2025-06-27 PROBLEM — M79.641 PAIN IN BOTH HANDS: Status: ACTIVE | Noted: 2025-06-27

## 2025-06-27 PROBLEM — M79.642 PAIN IN BOTH HANDS: Status: ACTIVE | Noted: 2025-06-27

## 2025-06-27 PROBLEM — R29.898 DECREASED GRIP STRENGTH OF RIGHT HAND: Status: ACTIVE | Noted: 2025-06-27

## 2025-06-27 PROBLEM — R29.898 DECREASED GRIP STRENGTH: Status: RESOLVED | Noted: 2023-02-02 | Resolved: 2025-06-27

## 2025-06-27 NOTE — PROGRESS NOTES
"  Outpatient Rehab    Occupational Therapy Evaluation    Patient Name: Juan Pablo Saeed  MRN: [id number]  YOB: 1980  Encounter Date: 6/25/2025    Therapy Diagnosis:   Encounter Diagnoses   Name Primary?    Pain in both hands Yes    Decreased  strength of right hand     Paresthesia of both hands      Physician: Mary Savage NP    Physician Orders: Eval and Treat  Medical Diagnosis: Pain in both hands  Surgical Diagnosis: Not applicable for this Episode   Surgical Date: Not applicable for this Episode  Days Since Last Surgery: Not applicable for this Episode    Visit # / Visits Authorized: 1 / 1  Insurance Authorization Period: 6/23/2025 to 12/31/2025  Date of Evaluation: 6/25/2025  Plan of Care Certification: 6/25/2025 to 8/22/2025     Time In: 0845   Time Out: 0930  Total Time (in minutes): 45   Total Billable Time (in minutes):  8 minutes     Intake Outcome Measure for FOTO Survey    Therapist reviewed FOTO scores for Juan Pablo Saeed on 6/25/2025.   FOTO report - see Media section or FOTO account episode details.     Intake Score: 71%    Precautions: HTN, Standard        Subjective   History of Present Illness  Juan Pablo is a 44 y.o. male      The patient reports a medical diagnosis of Pain in both hands (M79.641, M79.642).    Diagnostic tests related to this condition: X-ray.   X-Ray Details: 11/7/2024 XR HAND COMPLETE 3 VIEWS BILATERAL FINDINGS:  There is no fracture, dislocation, or bony erosion identified.    Dominant Hand: Right  History of Present Condition/Illness: Juan Pablo reports that his bilateral hand has returned and worsened over 6 months. He reports pain in the web spaces of his hands, pinky sides of his hands/wrists and "strange" pain in his nails and cuticles. He reports that his pain can worsen with cold room air or cold outside temperatures. Overall, he reports worse pain in his right hand compared to his left. He has night time wrist braces and has a wrist immobilizer braces that " he wears when working as a . Juan Pablo does reports that he was previously diagnosed with neuropathy in hands/feet prior to 2023 and is currently taking medication for nerve related pain. Juan Pablo has attended occupational therapy previously at this clinic for the same symptoms. He found that therapy helped at that time and would like to begin therapy before his hand pain gets worse.      Activities of Daily Living  Social history was obtained from Patient.               Previously independent with activities of daily living? Yes     Currently independent with activities of daily living? Yes          Previously independent with instrumental activities of daily living? Yes     Currently independent with instrumental activities of daily living? Yes              Pain     Patient reports a current pain level of 2/10. Pain at best is reported as 0/10. Pain at worst is reported as 10/10.   Location: right hand 2/10 average, worst 10/10, lowest 0/10; left hand 1/10 average, worst 8/10, lowest 0/10  Pain Qualities: Sharp, Aching  Pain-Relieving Factors: Rest, Other (Comment), Medications - over-the-counter  Other Pain-Relieving Factors: wrist immobilizer braces at night time and with work tasks, uses biofreeze gel, take tylenol when needed, and wears winter gloves on hands, hand massagers  Pain-Aggravating Factors: Other (Comment)  Other Pain-Aggravating Factors: cold temperatures, and he reports his pain worsens when he lays down flat         Living Arrangements  Living Situation  Living Arrangements: Family members        Employment  Employment Status: Employed full-time    at a plant       Past Medical History/Physical Systems Review:   Juan Pablo Saeed  has a past medical history of Back pain, GERD (gastroesophageal reflux disease), HTN (hypertension), Mixed hyperlipidemia, and Obesity.    Juan Pablo Saeed  has a past surgical history that includes Circumcision (2010) and  Colonoscopy (N/A, 11/9/2023).    Juan Pablo has a current medication list which includes the following prescription(s): acetaminophen, atorvastatin, lisinopril, multivitamin with minerals, pregabalin, and tirzepatide.    Review of patient's allergies indicates:  No Known Allergies     Objective      Upper Extremity Sensation  General Upper Extremity Sensation  Intact: Right and Left  Right Upper Extremity Sensation  Intact: Light Touch       Left Upper Extremity Sensation  Intact: Light Touch                Wrist/Hand Swelling Details     No significant swelling noted bilateral hands/wrists.            Elbow Palpation             He reports pain with palpation to left lateral elbow.  (Juan Pablo reports that he does have history of tennis elbow).         Wrist Range of Motion  Right Wrist   Active (deg) Passive (deg) Pain Comment   Flexion 75         Extension 65         Radial Deviation 35         Ulnar Deviation 20           Left Wrist   Active (deg) Passive (deg) Pain Comment   Flexion 60         Extension 68         Radial Deviation 35         Ulnar Deviation 20                     Adequate AROM: Right Hand and Left Hand  Patient has full composite fist and can oppose to 5th digit MCP bilateral hands.               Forearm Strength   Right Strength Right Pain Left Strength Left  Pain   Pronation 5   5     Supination 5   5         Wrist Strength - Planes of Motion   Right Strength Right Pain Left Strength Left  Pain   Flexion 5   5     Extension 5   5     Radial Deviation 5 Yes 5     Ulnar Deviation (C8) 5   5       Wrist Strength Details     Pain with MMT to radial deviation right hand     Right  Strength  Right Hand Dynamometer Position: 2  Elbow Position Forearm Position Trial 1 (lbs) Trial 2  (lbs) Trial 3  (lbs) Average  (lbs) Pain   Flexed Neutral 95               Left  Strength  Left Hand Dynamometer Position: 2  Elbow Position Forearm Position Trial 1 (lbs) Trial 2 (lbs) Trial 3 (lbs) Average (lbs) Pain  "  Flexed Neutral 87               Right Pinch Strength   Trial 1 (lbs) Trial 2 (lbs) Trial 3 (lbs) Average (lbs) Pain   Lateral (Key Pinch) 20           Three Point (Three Jaw Ger) 14           Two Point (Tip to Tip) 12               Left Pinch Strength   Trial 1 (lbs) Trial 2 (lbs) Trial 3 (lbs) Average (lbs) Pain   Lateral (Key Pinch) 28           Three Point (Three Jaw Ger) 17           Two Point (Tip to Tip) 14                      Elbow Special Tests  Elbow Nerve Entrapment Tests  Positive: Right Flexion and Left Flexion       Wrist/Hand Special Tests  Wrist/Hand Nerve Function Tests  Positive: Right Elbow Flexion and Left Elbow Flexion  Positive: Right Phalen's, Left Phalen's, Right Median Tinel's, and Left Median Tinel's  Wrist/Hand Tendinopathy Tests  Positive: Right Finkelstein's    Phalens: reports Left thumb/index/small finger numbness; reports Right index finger numbness.   Tinel's median nerve: Left tingling small finger only; reports tingling Right ring /small fingers;   Elbow flexion test: reports Left increased tingling thumb/index fingers; Right increased tingling ring/small fingers            Treatment:  Therapeutic Exercise  TE 1: -issued/reviewed initial HEP    Time Entry(in minutes):  OT Evaluation (Low) Time Entry: 37  Therapeutic Exercise Time Entry: 8    Assessment & Plan   Assessment  Juan Pablo presents with a condition of Low complexity.   Presentation of Symptoms: Stable  Will Comorbidities Impact Care: No       IADL Limitations: Home establishment and management, Grocery/shopping, Meal preparation and cleanup  Rest and Sleep Limitations: Disrupted sleep pattern  Functional Limitations: Activity tolerance, Carrying objects, Pain with ADLs/IADLs, Other (Comment) Strength                  Patient Goal for Therapy (OT): "To decrease pain in hands, to keep it from gettting worse."  Prognosis: Good  Assessment Details:  Juan Pablo is a 48 y.o. male referred to outpatient occupational therapy and " "presents with a medical diagnosis of Pain in both hands. Following medical record review it is determined that pt will benefit from occupational therapy services in order to maximize pain free and/or functional use of bilateral hands. The following goals were discussed with the patient and patient is in agreement with them as to be addressed in the treatment plan. The patient's rehab potential is Good.      Plan  From an occupational therapy perspective, the patient would benefit from: Skilled Rehab Services    Planned therapy interventions include: Therapeutic activities, Manual therapy, and Therapeutic exercise.    Planned modalities to include: Fluidotherapy, Paraffin bath, and Thermotherapy (hot pack).        Visit Frequency: 2 times Per Week for 8 Weeks.       This plan was discussed with Patient.   Discussion participants: Agreed Upon Plan of Care             The patient's spiritual, cultural, and educational needs were considered, and the patient is agreeable to the plan of care and goals.           Goals:   Active       Functional outcome       LTG: Patient stated goal: "To decrease pain in hands, to keep it from gettting worse."        Start:  06/27/25    Expected End:  08/22/25            LTG: Patient will demonstrate independence in appropriate home program by discharge date for support of progression       Start:  06/27/25    Expected End:  08/22/25               Pain         STG: Patient will report decrease in bilateral hand pain to no more than 1-2/10 50% of the time with performance of ADL/IADL's        Start:  06/27/25    Expected End:  07/25/25            LTG: Patient will report decrease in bilateral hands as no more than 0-1/10 75% of the time with performance of  ADL/IADL's        Start:  06/27/25    Expected End:  08/22/25               Sensation        LTG: Patient will be independent in nerve gliding techniques to decrease paraesthesias in bilateral hands 75% of the time with performance of " ADLs/IADLs        Start:  06/27/25    Expected End:  08/22/25            LTG: Patient will be independent with proximal shoulder strengthening program to reduce repetitive stress to distal upper extremity while performing ADLs and work related tasks.        Start:  06/27/25    Expected End:  08/22/25            LTG: Patient will independently verbalize 2-3 activity modifications used in daily activities to reduce repetitive stress and occurrence of paraesthesias with ADLs/IADLs        Start:  06/27/25    Expected End:  08/22/25               Strength         STG: Increase right hand  strength by 5-8# for increased functional use in ADL/IADL's          Start:  06/27/25    Expected End:  07/25/25            STG: Increase right hand lateral pinch strength by 5-8# of original measures for increased functional use in ADL/IADL's          Start:  06/27/25    Expected End:  07/25/25            LTG: Patient will tolerate light resistive gripping exercise X 2 min without reported increased pain bilateral hands to increase functional use for ADLs/IADLs         Start:  06/27/25    Expected End:  08/22/25            LTG: Patient will tolerate light resistive sustained gripping exercise X 4 min without reports of increased pain to increase functional use for ADLs/IADLs         Start:  06/27/25    Expected End:  08/22/25                Ayan Crockett OT

## 2025-06-30 ENCOUNTER — HOSPITAL ENCOUNTER (OUTPATIENT)
Dept: CARDIOLOGY | Facility: HOSPITAL | Age: 45
Discharge: HOME | End: 2025-06-30
Payer: COMMERCIAL

## 2025-06-30 VITALS
DIASTOLIC BLOOD PRESSURE: 81 MMHG | WEIGHT: 315 LBS | SYSTOLIC BLOOD PRESSURE: 118 MMHG | BODY MASS INDEX: 42.66 KG/M2 | HEIGHT: 72 IN

## 2025-06-30 DIAGNOSIS — R07.9 CHEST PAIN, UNSPECIFIED TYPE: ICD-10-CM

## 2025-06-30 DIAGNOSIS — I10 ESSENTIAL HYPERTENSION: ICD-10-CM

## 2025-06-30 DIAGNOSIS — R00.0 TACHYCARDIA: ICD-10-CM

## 2025-06-30 LAB
AORTIC ROOT ANNULUS: 2.9 CM
AORTIC SIZE INDEX (SOV): 1 CM/M2
AORTIC SIZE INDEX: 1.1 CM/M2
ASCENDING AORTA: 3 CM
AV INDEX (PROSTH): 0.77
AV MEAN GRADIENT: 4 MMHG
AV PEAK GRADIENT: 7 MMHG
AV VALVE AREA BY VELOCITY RATIO: 2.9 CM²
AV VALVE AREA: 2.9 CM²
AV VELOCITY RATIO: 0.77
BSA FOR ECHO PROCEDURE: 2.82 M2
CV ECHO LV RWT: 0.5 CM
DOP CALC AO PEAK VEL: 1.3 M/S
DOP CALC AO VTI: 27.9 CM
DOP CALC LVOT AREA: 3.8 CM2
DOP CALC LVOT DIAMETER: 2.2 CM
DOP CALC LVOT PEAK VEL: 1 M/S
DOP CALC LVOT STROKE VOLUME: 81.3 CM3
DOP CALC RVOT PEAK VEL: 0.64 M/S
DOP CALC RVOT VTI: 16.2 CM
DOP CALCLVOT PEAK VEL VTI: 21.4 CM
E WAVE DECELERATION TIME: 230 MSEC
E/A RATIO: 1.15
E/E' RATIO: 5 M/S
ECHO LV POSTERIOR WALL: 1.2 CM (ref 0.6–1.1)
FRACTIONAL SHORTENING: 37.5 % (ref 28–44)
INTERVENTRICULAR SEPTUM: 1.2 CM (ref 0.6–1.1)
IVC DIAMETER: 1.39 CM
IVRT: 69 MSEC
LA MAJOR: 5.1 CM
LA MINOR: 4.9 CM
LA WIDTH: 3.8 CM
LEFT ATRIUM AREA SYSTOLIC (APICAL 2 CHAMBER): 17.74 CM2
LEFT ATRIUM AREA SYSTOLIC (APICAL 4 CHAMBER): 18.44 CM2
LEFT ATRIUM SIZE: 3.7 CM
LEFT ATRIUM VOLUME INDEX MOD: 19 ML/M2
LEFT ATRIUM VOLUME INDEX: 22 ML/M2
LEFT ATRIUM VOLUME MOD: 50 ML
LEFT ATRIUM VOLUME: 60 CM3
LEFT INTERNAL DIMENSION IN SYSTOLE: 3 CM (ref 2.1–4)
LEFT VENTRICLE DIASTOLIC VOLUME INDEX: 39.03 ML/M2
LEFT VENTRICLE DIASTOLIC VOLUME: 105 ML
LEFT VENTRICLE END SYSTOLIC VOLUME APICAL 2 CHAMBER: 48.5 ML
LEFT VENTRICLE END SYSTOLIC VOLUME APICAL 4 CHAMBER: 47.77 ML
LEFT VENTRICLE MASS INDEX: 81.5 G/M2
LEFT VENTRICLE SYSTOLIC VOLUME INDEX: 13 ML/M2
LEFT VENTRICLE SYSTOLIC VOLUME: 35 ML
LEFT VENTRICULAR INTERNAL DIMENSION IN DIASTOLE: 4.8 CM (ref 3.5–6)
LEFT VENTRICULAR MASS: 219.1 G
LV LATERAL E/E' RATIO: 4.4 M/S
LV SEPTAL E/E' RATIO: 5.2 M/S
LVED V (TEICH): 105.16 ML
LVES V (TEICH): 35.2 ML
LVOT MG: 2.53 MMHG
LVOT MV: 0.77 CM/S
MV PEAK A VEL: 0.54 M/S
MV PEAK E VEL: 0.62 M/S
MV STENOSIS PRESSURE HALF TIME: 66.7 MS
MV VALVE AREA P 1/2 METHOD: 3.3 CM2
OHS CV RV/LV RATIO: 0.65 CM
PISA TR MAX VEL: 2.5 M/S
PV MEAN GRADIENT: 1 MMHG
RA MAJOR: 4.46 CM
RA PRESSURE ESTIMATED: 3 MMHG
RA WIDTH: 3.08 CM
RIGHT VENTRICLE DIASTOLIC BASEL DIMENSION: 3.1 CM
RV TB RVSP: 6 MMHG
SINUS: 2.7 CM
STJ: 2.8 CM
TDI LATERAL: 0.14 M/S
TDI SEPTAL: 0.12 M/S
TDI: 0.13 M/S
TR MAX PG: 25 MMHG
TRICUSPID ANNULAR PLANE SYSTOLIC EXCURSION: 2.3 CM
TV REST PULMONARY ARTERY PRESSURE: 28 MMHG
Z-SCORE OF LEFT VENTRICULAR DIMENSION IN END DIASTOLE: -16.39
Z-SCORE OF LEFT VENTRICULAR DIMENSION IN END SYSTOLE: -12.37

## 2025-06-30 PROCEDURE — 93225 XTRNL ECG REC<48 HRS REC: CPT | Mod: PO

## 2025-06-30 PROCEDURE — 93306 TTE W/DOPPLER COMPLETE: CPT | Mod: 26,,, | Performed by: INTERNAL MEDICINE

## 2025-06-30 PROCEDURE — 93306 TTE W/DOPPLER COMPLETE: CPT | Mod: PO

## 2025-07-02 LAB
OHS CV EVENT MONITOR DAY: 0
OHS CV HOLTER LENGTH DECIMAL HOURS: 47.98
OHS CV HOLTER LENGTH HOURS: 47
OHS CV HOLTER LENGTH MINUTES: 59
OHS CV HOLTER SINUS AVERAGE HR: 80
OHS CV HOLTER SINUS MAX HR: 167
OHS CV HOLTER SINUS MIN HR: 55

## 2025-07-03 ENCOUNTER — CLINICAL SUPPORT (OUTPATIENT)
Dept: REHABILITATION | Facility: HOSPITAL | Age: 45
End: 2025-07-03
Payer: COMMERCIAL

## 2025-07-03 DIAGNOSIS — R20.2 PARESTHESIA OF BOTH HANDS: ICD-10-CM

## 2025-07-03 DIAGNOSIS — M79.641 PAIN IN BOTH HANDS: Primary | ICD-10-CM

## 2025-07-03 DIAGNOSIS — M79.642 PAIN IN BOTH HANDS: Primary | ICD-10-CM

## 2025-07-03 DIAGNOSIS — R29.898 DECREASED GRIP STRENGTH OF RIGHT HAND: ICD-10-CM

## 2025-07-03 PROCEDURE — 97018 PARAFFIN BATH THERAPY: CPT | Mod: PN

## 2025-07-03 PROCEDURE — 97110 THERAPEUTIC EXERCISES: CPT | Mod: PN

## 2025-07-03 PROCEDURE — 97140 MANUAL THERAPY 1/> REGIONS: CPT | Mod: PN

## 2025-07-04 NOTE — PROGRESS NOTES
"  Outpatient Rehab    Occupational Therapy Visit    Patient Name: Juan Pablo Saeed  MRN: [id number]  YOB: 1980  Encounter Date: 7/3/2025    Therapy Diagnosis:   Encounter Diagnoses   Name Primary?    Pain in both hands Yes    Decreased  strength of right hand     Paresthesia of both hands      Physician: Mary Savage NP    Physician Orders: Eval and Treat  Medical Diagnosis: Pain in both hands  Surgical Diagnosis: Not applicable for this Episode   Surgical Date: Not applicable for this Episode  Days Since Last Surgery: Not applicable for this Episode    Visit # / Visits Authorized: 1 / 10  Insurance Authorization Period: 6/24/2025 to 12/31/2025  Date of Evaluation: 6/25/2025  Plan of Care Certification: 6/27/2025 to 8/22/2025      Time In: 1330   Time Out: 1415  Total Time (in minutes): 45   Total Billable Time (in minutes):  45 minutes     FOTO:  Intake Score: 71%  Survey Score 2:  %  Survey Score 3:  %    Precautions: HTN, Standard        Subjective   "I feel the pain in my hands immediately when you took off the heat.".  Pain reported as 2/10. pain in ring and small fingers    Objective            Treatment:  Therapeutic Exercise  TE 1: Wrist stretches 2 ways, green theraweb, 3 X 20 sec hold. Performed bilaterally.  TE 2: Ulnar deviation stretch 3X 20 sec hold. Performed bilaterally.  TE 3: Median nerve glide X 20 slow controlled reps. Performed bilaterally.  TE 4: Ulnar nerve glide X 20 slow controlled reps. Performed bilaterally.  Manual Therapy  MT 1: - use of hand techniques, cupping and IASTM tools to bilateral hands/wrists/forearms increase blood flow/circulation, improve soft tissue pliability and decrease pain.  Modalities  Paraffin Bath: Paraffin w/ MHP to bilateral hands for 8 min, pre-tx to decrease pain & increase tissue extensibility    Time Entry(in minutes):  Paraffin Bath Time Entry: 8  Manual Therapy Time Entry: 23  Therapeutic Exercise Time Entry: 14    Assessment & Plan " "  Assessment: Moderate density of bilateral forearms musculature noted. Patient reports mild pain with R wrist ulnar deviation stretch. He demonstrated ulnar nerve irritability symptoms reporting numbness/tingling in L ring and small fingers with ulnar nerve glides. Will continue to progress per tolerance.        The patient will continue to benefit from skilled outpatient occupational therapy in order to address the deficits listed in the problem list on the initial evaluation, provide patient and family education, and maximize the patients level of independence in the home and community environments.     The patient's spiritual, cultural, and educational needs were considered, and the patient is agreeable to the plan of care and goals.           Plan: continue modalities as needed, progress nerve glides as tolerated, progress range of motion as tolerated, progress to strengthening as tolerated    Goals:   Active       Functional outcome       LTG: Patient stated goal: "To decrease pain in hands, to keep it from gettting worse."  (Progressing)       Start:  06/27/25    Expected End:  08/22/25            LTG: Patient will demonstrate independence in appropriate home program by discharge date for support of progression (Progressing)       Start:  06/27/25    Expected End:  08/22/25               Pain         STG: Patient will report decrease in bilateral hand pain to no more than 1-2/10 50% of the time with performance of ADL/IADL's  (Progressing)       Start:  06/27/25    Expected End:  07/25/25            LTG: Patient will report decrease in bilateral hands as no more than 0-1/10 75% of the time with performance of  ADL/IADL's  (Progressing)       Start:  06/27/25    Expected End:  08/22/25               Sensation        LTG: Patient will be independent in nerve gliding techniques to decrease paraesthesias in bilateral hands 75% of the time with performance of ADLs/IADLs  (Progressing)       Start:  06/27/25    " Expected End:  08/22/25            LTG: Patient will be independent with proximal shoulder strengthening program to reduce repetitive stress to distal upper extremity while performing ADLs and work related tasks.  (Progressing)       Start:  06/27/25    Expected End:  08/22/25            LTG: Patient will independently verbalize 2-3 activity modifications used in daily activities to reduce repetitive stress and occurrence of paraesthesias with ADLs/IADLs  (Progressing)       Start:  06/27/25    Expected End:  08/22/25               Strength         STG: Increase right hand  strength by 5-8# for increased functional use in ADL/IADL's    (Progressing)       Start:  06/27/25    Expected End:  07/25/25            STG: Increase right hand lateral pinch strength by 5-8# of original measures for increased functional use in ADL/IADL's    (Progressing)       Start:  06/27/25    Expected End:  07/25/25            LTG: Patient will tolerate light resistive gripping exercise X 2 min without reported increased pain bilateral hands to increase functional use for ADLs/IADLs   (Progressing)       Start:  06/27/25    Expected End:  08/22/25            LTG: Patient will tolerate light resistive sustained gripping exercise X 4 min without reports of increased pain to increase functional use for ADLs/IADLs   (Progressing)       Start:  06/27/25    Expected End:  08/22/25                Ayan Crockett OT

## 2025-07-08 ENCOUNTER — TELEPHONE (OUTPATIENT)
Dept: CARDIOLOGY | Facility: CLINIC | Age: 45
End: 2025-07-08
Payer: COMMERCIAL

## 2025-07-08 NOTE — TELEPHONE ENCOUNTER
----- Message from Arely Mcclelland NP sent at 7/8/2025 10:09 AM CDT -----  Holter with no concerning arrhythmias   ----- Message -----  From: Jovon Aguilar MD  Sent: 7/8/2025   8:51 AM CDT  To: Arely Mcclelland NP      I called pt. No answer. Mailbox full. Unable to leave message.     [facility] message sent with results

## 2025-07-09 ENCOUNTER — TELEPHONE (OUTPATIENT)
Dept: CARDIOLOGY | Facility: CLINIC | Age: 45
End: 2025-07-09
Payer: COMMERCIAL

## 2025-07-09 NOTE — TELEPHONE ENCOUNTER
Lvm to review results and recommendations-Heart US stable, no changes in treatment at this time.     Sent MYCHART message      ----- Message from Arely Mcclelland NP sent at 7/9/2025  2:34 PM CDT -----  Heart US stable, no changes in treatment at this time.   ----- Message -----  From: Armin Bull MD  Sent: 6/30/2025   6:34 PM CDT  To: Arely Mcclelland NP

## 2025-07-17 ENCOUNTER — CLINICAL SUPPORT (OUTPATIENT)
Dept: REHABILITATION | Facility: HOSPITAL | Age: 45
End: 2025-07-17
Payer: COMMERCIAL

## 2025-07-17 DIAGNOSIS — R20.2 PARESTHESIA OF BOTH HANDS: ICD-10-CM

## 2025-07-17 DIAGNOSIS — M79.641 PAIN IN BOTH HANDS: Primary | ICD-10-CM

## 2025-07-17 DIAGNOSIS — M79.642 PAIN IN BOTH HANDS: Primary | ICD-10-CM

## 2025-07-17 DIAGNOSIS — R29.898 DECREASED GRIP STRENGTH OF RIGHT HAND: ICD-10-CM

## 2025-07-17 PROCEDURE — 97018 PARAFFIN BATH THERAPY: CPT | Mod: PN

## 2025-07-17 PROCEDURE — 97110 THERAPEUTIC EXERCISES: CPT | Mod: PN

## 2025-07-17 PROCEDURE — 97140 MANUAL THERAPY 1/> REGIONS: CPT | Mod: PN

## 2025-07-17 NOTE — PROGRESS NOTES
"  Outpatient Rehab    Occupational Therapy Visit    Patient Name: Juan Pablo Saeed  MRN: [id number]  YOB: 1980  Encounter Date: 7/17/2025    Therapy Diagnosis:   Encounter Diagnoses   Name Primary?    Pain in both hands Yes    Decreased  strength of right hand     Paresthesia of both hands      Physician: Mary Savage NP    Physician Orders: Eval and Treat  Medical Diagnosis: Pain in both hands  Surgical Diagnosis: Not applicable for this Episode   Surgical Date: Not applicable for this Episode  Days Since Last Surgery: Not applicable for this Episode    Visit # / Visits Authorized: 2 / 10  Insurance Authorization Period: 6/24/2025 to 12/31/2025  Date of Evaluation: 6/25/2025  Plan of Care Certification: 6/27/2025 to 8/22/2025      Time In: 0900   Time Out: 0945  Total Time (in minutes): 45   Total Billable Time (in minutes):  45 minutes     FOTO:  Intake Score: 71%  Survey Score 2:  %  Survey Score 3:  %    Precautions: HTN, Standard        Subjective   Patient is returning from vacation. "It's funny, it didnt hurt as bad when I was on vacation." Today, he has reported pain of 1-2/10 in hands adn there is no numbness of ring and small finger.  Pain reported as 1/10.      Objective            Treatment:  Therapeutic Exercise  TE 1: Wrist stretches 2 ways, green theraweb, 3 X 30 sec hold. Performed bilaterally.  TE 2: Ulnar deviation stretch 3X 20 sec hold. Performed right wrist only.  TE 3: Median nerve glide X 20 slow controlled reps. Performed bilaterally.  TE 4: Ulnar nerve glide X 20 slow controlled reps. Performed bilaterally.  TE 5: Bicep stretch with towel behind back 5 X 10 sec hold  Manual Therapy  MT 1: - use of hand techniques, cupping and IASTM tools to bilateral hands/wrists/forearms increase blood flow/circulation, improve soft tissue pliability and decrease pain.  Modalities  Paraffin Bath: Paraffin w/ MHP to bilateral hands for 8 min, pre-tx to decrease pain & increase tissue " "extensibility    Time Entry(in minutes):  Paraffin Bath Time Entry: 8  Manual Therapy Time Entry: 23  Therapeutic Exercise Time Entry: 14    Assessment & Plan   Assessment: Patient attended 2nd treatment session since initial evaluation. Moderate density of bilateral forearms musculature noted. Patient reports mild pain with R wrist ulnar deviation stretch. He demonstrated no ulnar nerve irritability symptoms today. He reports no numbness/tingling in L ring and small fingers with ulnar nerve glides. He would like to continue therapy once per week at this time. Will continue to progress per tolerance.        The patient will continue to benefit from skilled outpatient occupational therapy in order to address the deficits listed in the problem list on the initial evaluation, provide patient and family education, and maximize the patients level of independence in the home and community environments.     The patient's spiritual, cultural, and educational needs were considered, and the patient is agreeable to the plan of care and goals.           Plan: continue modalities as needed, progress nerve glides as tolerated, progress range of motion as tolerated, progress to strengthening as tolerated    Goals:   Active       Functional outcome       LTG: Patient stated goal: "To decrease pain in hands, to keep it from gettting worse."  (Progressing)       Start:  06/27/25    Expected End:  08/22/25            LTG: Patient will demonstrate independence in appropriate home program by discharge date for support of progression (Progressing)       Start:  06/27/25    Expected End:  08/22/25               Pain         STG: Patient will report decrease in bilateral hand pain to no more than 1-2/10 50% of the time with performance of ADL/IADL's  (Progressing)       Start:  06/27/25    Expected End:  07/25/25            LTG: Patient will report decrease in bilateral hands as no more than 0-1/10 75% of the time with performance of  " ADL/IADL's  (Progressing)       Start:  06/27/25    Expected End:  08/22/25               Sensation        LTG: Patient will be independent in nerve gliding techniques to decrease paraesthesias in bilateral hands 75% of the time with performance of ADLs/IADLs  (Progressing)       Start:  06/27/25    Expected End:  08/22/25            LTG: Patient will be independent with proximal shoulder strengthening program to reduce repetitive stress to distal upper extremity while performing ADLs and work related tasks.  (Progressing)       Start:  06/27/25    Expected End:  08/22/25            LTG: Patient will independently verbalize 2-3 activity modifications used in daily activities to reduce repetitive stress and occurrence of paraesthesias with ADLs/IADLs  (Progressing)       Start:  06/27/25    Expected End:  08/22/25               Strength         STG: Increase right hand  strength by 5-8# for increased functional use in ADL/IADL's    (Progressing)       Start:  06/27/25    Expected End:  07/25/25            STG: Increase right hand lateral pinch strength by 5-8# of original measures for increased functional use in ADL/IADL's    (Progressing)       Start:  06/27/25    Expected End:  07/25/25            LTG: Patient will tolerate light resistive gripping exercise X 2 min without reported increased pain bilateral hands to increase functional use for ADLs/IADLs   (Progressing)       Start:  06/27/25    Expected End:  08/22/25            LTG: Patient will tolerate light resistive sustained gripping exercise X 4 min without reports of increased pain to increase functional use for ADLs/IADLs   (Progressing)       Start:  06/27/25    Expected End:  08/22/25                Ayan Crockett, OT

## 2025-07-21 ENCOUNTER — HOSPITAL ENCOUNTER (OUTPATIENT)
Dept: CARDIOLOGY | Facility: HOSPITAL | Age: 45
Discharge: HOME | End: 2025-07-21
Payer: COMMERCIAL

## 2025-07-21 DIAGNOSIS — R07.9 CHEST PAIN, UNSPECIFIED TYPE: ICD-10-CM

## 2025-07-21 DIAGNOSIS — I10 ESSENTIAL HYPERTENSION: ICD-10-CM

## 2025-07-21 LAB
CV STRESS BASE HR: 89 BPM
DIASTOLIC BLOOD PRESSURE: 77 MMHG
OHS CV CPX 85 PERCENT MAX PREDICTED HEART RATE MALE: 149
OHS CV CPX ESTIMATED METS: 7
OHS CV CPX MAX PREDICTED HEART RATE: 175
OHS CV CPX PATIENT IS FEMALE: 0
OHS CV CPX PATIENT IS MALE: 1
OHS CV CPX PEAK DIASTOLIC BLOOD PRESSURE: 44 MMHG
OHS CV CPX PEAK HEAR RATE: 166 BPM
OHS CV CPX PEAK RATE PRESSURE PRODUCT: NORMAL
OHS CV CPX PEAK SYSTOLIC BLOOD PRESSURE: 173 MMHG
OHS CV CPX PERCENT MAX PREDICTED HEART RATE ACHIEVED: 95
OHS CV CPX RATE PRESSURE PRODUCT PRESENTING: 9701
STRESS ECHO POST EXERCISE DUR MIN: 5 MINUTES
STRESS ECHO POST EXERCISE DUR SEC: 44 SECONDS
SYSTOLIC BLOOD PRESSURE: 109 MMHG

## 2025-07-21 PROCEDURE — 93016 CV STRESS TEST SUPVJ ONLY: CPT | Mod: ,,, | Performed by: INTERNAL MEDICINE

## 2025-07-21 PROCEDURE — 93018 CV STRESS TEST I&R ONLY: CPT | Mod: ,,, | Performed by: INTERNAL MEDICINE

## 2025-07-21 PROCEDURE — 93017 CV STRESS TEST TRACING ONLY: CPT

## 2025-07-22 ENCOUNTER — TELEPHONE (OUTPATIENT)
Dept: CARDIOLOGY | Facility: CLINIC | Age: 45
End: 2025-07-22
Payer: COMMERCIAL

## 2025-07-22 NOTE — TELEPHONE ENCOUNTER
----- Message from Arely Mcclelland NP sent at 7/22/2025  9:24 AM CDT -----  Stress test nml  ----- Message -----  From: Armin Bull MD  Sent: 7/21/2025   9:45 PM CDT  To: Arely Mcclelland NP

## 2025-07-30 ENCOUNTER — CLINICAL SUPPORT (OUTPATIENT)
Dept: REHABILITATION | Facility: HOSPITAL | Age: 45
End: 2025-07-30
Payer: COMMERCIAL

## 2025-07-30 DIAGNOSIS — R20.2 PARESTHESIA OF BOTH HANDS: ICD-10-CM

## 2025-07-30 DIAGNOSIS — M79.641 PAIN IN BOTH HANDS: Primary | ICD-10-CM

## 2025-07-30 DIAGNOSIS — M79.642 PAIN IN BOTH HANDS: Primary | ICD-10-CM

## 2025-07-30 DIAGNOSIS — R29.898 DECREASED GRIP STRENGTH OF RIGHT HAND: ICD-10-CM

## 2025-07-30 PROCEDURE — 97140 MANUAL THERAPY 1/> REGIONS: CPT | Mod: PN

## 2025-07-30 PROCEDURE — 97110 THERAPEUTIC EXERCISES: CPT | Mod: PN

## 2025-07-30 PROCEDURE — 97018 PARAFFIN BATH THERAPY: CPT | Mod: PN

## 2025-07-31 NOTE — PROGRESS NOTES
"  Outpatient Rehab    Occupational Therapy Visit    Patient Name: Juan Pablo Saeed  MRN: [id number]  YOB: 1980  Encounter Date: 7/30/2025    Therapy Diagnosis:   Encounter Diagnoses   Name Primary?    Pain in both hands Yes    Decreased  strength of right hand     Paresthesia of both hands      Physician: Mary Savage NP    Physician Orders: Eval and Treat  Medical Diagnosis: Pain in both hands  Surgical Diagnosis: Not applicable for this Episode   Surgical Date: Not applicable for this Episode  Days Since Last Surgery: Not applicable for this Episode    Visit # / Visits Authorized: 3 / 10  Insurance Authorization Period: 6/24/2025 to 12/31/2025  Date of Evaluation: 6/25/2025  Plan of Care Certification: 6/27/2025 to 8/22/2025      Time In: 1100   Time Out: 1200  Total Time (in minutes): 60   Total Billable Time (in minutes):  60 minutes     FOTO:  Intake Score: 71%  Survey Score 2:  %  Survey Score 3:  %    Precautions: HTN, Standard        Subjective   "The pain is not that bad today. I feel like tad pain is the neuropathy. It's better when I'm not in a cold room. The treatments do help".  Pain reported as 1/10.      Objective      Objective Measures updated at progress report unless specified.         Treatment:  Therapeutic Exercise  TE 1: Wrist stretches 2 ways, green theraweb, 3 X 30 sec hold. Performed bilaterally.  TE 2: Ulnar deviation stretch 3X 20 sec hold. Performed bilaterally.  TE 3: Bicep stretch with towel behind back 3 X 30 sec hold  TE 4: Supination stretch with towel 2 X 30 sec hold  TE 5: Serratus slides X 25 reps  TE 6: Ball on wall X 1 min. Performed bilaterally.  TE 7: Ulnar nerve glide X 20 slow controlled reps. Performed bilaterally. (not performed today)  TE 8: Median nerve glide X 20 slow controlled reps. Performed bilaterally. (not performed today)  Manual Therapy  MT 1: - use of hand techniques, cupping and IASTM tools to bilateral hands/wrists/forearms increase blood " "flow/circulation, improve soft tissue pliability and decrease pain.  Modalities  Paraffin Bath: Paraffin w/ MHP to bilateral hands for 8 min, pre-tx to decrease pain & increase tissue extensibility    Time Entry(in minutes):  Paraffin Bath Time Entry: 8  Manual Therapy Time Entry: 25  Therapeutic Exercise Time Entry: 27    Assessment & Plan   Assessment: Patient attended 3rd treatment session since initial evaluation. Minimal density of bilateral forearms musculature noted; was decreased today from previous sessions. However, patient was more hypersensitive with manual therapy in comparison to previous sessions. He continues to report mild pain with R wrist ulnar deviation stretch. More tightness of right forearm with supination stretch noted. He would like to continue therapy once per week at this time. Will continue to progress per tolerance.        The patient will continue to benefit from skilled outpatient occupational therapy in order to address the deficits listed in the problem list on the initial evaluation, provide patient and family education, and maximize the patients level of independence in the home and community environments.     The patient's spiritual, cultural, and educational needs were considered, and the patient is agreeable to the plan of care and goals.           Plan: continue modalities as needed, progress nerve glides as tolerated, progress range of motion as tolerated, progress to strengthening as tolerated    Goals:   Active       Functional outcome       LTG: Patient stated goal: "To decrease pain in hands, to keep it from gettting worse."  (Progressing)       Start:  06/27/25    Expected End:  08/22/25            LTG: Patient will demonstrate independence in appropriate home program by discharge date for support of progression (Progressing)       Start:  06/27/25    Expected End:  08/22/25               Pain         STG: Patient will report decrease in bilateral hand pain to no more " than 1-2/10 50% of the time with performance of ADL/IADL's  (Progressing)       Start:  06/27/25    Expected End:  07/25/25            LTG: Patient will report decrease in bilateral hands as no more than 0-1/10 75% of the time with performance of  ADL/IADL's  (Progressing)       Start:  06/27/25    Expected End:  08/22/25               Sensation        LTG: Patient will be independent in nerve gliding techniques to decrease paraesthesias in bilateral hands 75% of the time with performance of ADLs/IADLs  (Progressing)       Start:  06/27/25    Expected End:  08/22/25            LTG: Patient will be independent with proximal shoulder strengthening program to reduce repetitive stress to distal upper extremity while performing ADLs and work related tasks.  (Progressing)       Start:  06/27/25    Expected End:  08/22/25            LTG: Patient will independently verbalize 2-3 activity modifications used in daily activities to reduce repetitive stress and occurrence of paraesthesias with ADLs/IADLs  (Progressing)       Start:  06/27/25    Expected End:  08/22/25               Strength         STG: Increase right hand  strength by 5-8# for increased functional use in ADL/IADL's    (Progressing)       Start:  06/27/25    Expected End:  07/25/25            STG: Increase right hand lateral pinch strength by 5-8# of original measures for increased functional use in ADL/IADL's    (Progressing)       Start:  06/27/25    Expected End:  07/25/25            LTG: Patient will tolerate light resistive gripping exercise X 2 min without reported increased pain bilateral hands to increase functional use for ADLs/IADLs   (Progressing)       Start:  06/27/25    Expected End:  08/22/25            LTG: Patient will tolerate light resistive sustained gripping exercise X 4 min without reports of increased pain to increase functional use for ADLs/IADLs   (Progressing)       Start:  06/27/25    Expected End:  08/22/25                Ayan   Bon, OT

## 2025-08-01 ENCOUNTER — OFFICE VISIT (OUTPATIENT)
Dept: CARDIOLOGY | Facility: CLINIC | Age: 45
End: 2025-08-01
Payer: COMMERCIAL

## 2025-08-01 VITALS
HEART RATE: 84 BPM | DIASTOLIC BLOOD PRESSURE: 82 MMHG | WEIGHT: 315 LBS | HEIGHT: 72 IN | SYSTOLIC BLOOD PRESSURE: 126 MMHG | BODY MASS INDEX: 42.66 KG/M2

## 2025-08-01 DIAGNOSIS — I10 ESSENTIAL HYPERTENSION: ICD-10-CM

## 2025-08-01 DIAGNOSIS — E78.2 MIXED HYPERLIPIDEMIA: ICD-10-CM

## 2025-08-01 DIAGNOSIS — Z82.49 FAMILY HISTORY OF EARLY CAD: Primary | ICD-10-CM

## 2025-08-01 DIAGNOSIS — G47.33 OSA (OBSTRUCTIVE SLEEP APNEA): ICD-10-CM

## 2025-08-01 PROCEDURE — 99999 PR PBB SHADOW E&M-EST. PATIENT-LVL III: CPT | Mod: PBBFAC,,,

## 2025-08-01 RX ORDER — NAPROXEN SODIUM 220 MG/1
81 TABLET, FILM COATED ORAL DAILY
COMMUNITY

## 2025-08-01 NOTE — PROGRESS NOTES
Subjective:   Patient ID:  Juan Pablo Saeed is a 44 y.o. male who presents for evaluation of No chief complaint on file.      HPI 44-year-old male whose current medical conditions include HTN, HLP, obesity, CKD, GERD, MOSHE, pre DM, chronic chest pain previously followed by Dr. O[facility]co [facility] here today for CV follow-up recently seen by PCP complaining of recurrent chest pains radiating to his shoulder does admit to stress at work which could be contributing, diaphoresis.  Patient is planning to go out of the country soon, noted to have complaints of chest pain on off for years. States his chest pain feels like a pressure pushing in the middle of his chest. Occ noticed edema, tight boots when at work. 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 EKG normal sinus rhythm. Here today still having daily episodes of chest pain describes them as a sharp like knife pain lasting a few seconds. Inquiring about returning to work   Lab Results   Component Value Date    HGBA1C 5.3 06/18/2025       Past Medical History:   Diagnosis Date    Back pain     GERD (gastroesophageal reflux disease)     HTN (hypertension)     Mixed hyperlipidemia     Obesity        Past Surgical History:   Procedure Laterality Date    CIRCUMCISION  2010    COLONOSCOPY N/A 11/9/2023    Procedure: COLONOSCOPY- possible banding;  Surgeon: Ce Herrera MD;  Location: [facility];  Service: Colon and Rectal;  Laterality: N/A;       Social History[1]    Family History   Problem Relation Name Age of Onset    Arthritis Mother      Cancer Mother          stomach    Heart failure Father       Diabetes Brother      Alcohol abuse Brother      Stroke Maternal Grandmother         Medications Ordered Prior to Encounter[2]   Wt Readings from Last 3 Encounters:   06/30/25 (!) 156.9 kg (346 lb)   06/19/25 (!) 157.2 kg (346 lb 9 oz)   06/18/25 (!) 159.5 kg (351 lb 10.1 oz)     Temp Readings from Last 3 Encounters:   06/18/25 97.5 °F (36.4 °C) (Tympanic)   11/07/24 99 °F (37.2 °C) (Oral)   08/20/24 98 °F (36.7 °C) (Tympanic)     BP Readings from Last 3 Encounters:   06/30/25 118/81   06/19/25 108/70   06/18/25 112/84     Pulse Readings from Last 3 Encounters:   06/19/25 100   06/18/25 78   02/05/25 66        Review of Systems   Constitutional: Negative.   HENT: Negative.     Eyes: Negative.    Cardiovascular:  Positive for chest pain.   Respiratory: Negative.     Skin: Negative.    Musculoskeletal: Negative.    Gastrointestinal: Negative.    Genitourinary: Negative.    Neurological: Negative.    Psychiatric/Behavioral: Negative.         Objective:   Physical Exam  Vitals and nursing note reviewed.   Constitutional:       Appearance: Normal appearance.   HENT:      Head: Normocephalic and atraumatic.   Eyes:      General:         Right eye: No discharge.         Left eye: No discharge.      Pupils: Pupils are equal, round, and reactive to light.   Cardiovascular:      Rate and Rhythm: Normal rate and regular rhythm.      Heart sounds: S1 normal and S2 normal. No murmur heard.     No friction rub.   Pulmonary:      Effort: Pulmonary effort is normal. No respiratory distress.      Breath sounds: Normal breath sounds. No rales.   Abdominal:      Palpations: Abdomen is soft.      Tenderness: There is no abdominal tenderness.   Musculoskeletal:      Cervical back: Neck supple.      Right lower leg: No edema.      Left lower leg: No edema.   Skin:     General: Skin is warm and dry.   Neurological:      General: No focal deficit present.      Mental Status: He is alert and oriented to person, place, and time.  "  Psychiatric:         Mood and Affect: Mood normal.         Behavior: Behavior normal.         Thought Content: Thought content normal.         Lab Results   Component Value Date    CHOL 177 06/18/2025    CHOL 179 08/20/2024    CHOL 216 (H) 08/08/2023     Lab Results   Component Value Date    HDL 35 (L) 06/18/2025    HDL 37 (L) 08/20/2024    HDL 35 (L) 08/08/2023     Lab Results   Component Value Date    LDLCALC 103.8 06/18/2025    LDLCALC 103.4 08/20/2024    LDLCALC 151.0 08/08/2023     Lab Results   Component Value Date    TRIG 191 (H) 06/18/2025    TRIG 193 (H) 08/20/2024    TRIG 150 08/08/2023     Lab Results   Component Value Date    CHOLHDL 19.8 (L) 06/18/2025    CHOLHDL 20.7 08/20/2024    CHOLHDL 16.2 (L) 08/08/2023       Chemistry        Component Value Date/Time     06/18/2025 1414     08/20/2024 1045    K 4.1 06/18/2025 1414    K 4.2 08/20/2024 1045     06/18/2025 1414     08/20/2024 1045    CO2 23 06/18/2025 1414    CO2 23 08/20/2024 1045    BUN 18 06/18/2025 1414    CREATININE 1.7 (H) 06/18/2025 1414    GLU 68 (L) 06/18/2025 1414    GLU 60 (L) 08/20/2024 1045        Component Value Date/Time    CALCIUM 9.7 06/18/2025 1414    CALCIUM 9.7 08/20/2024 1045    ALKPHOS 59 06/18/2025 1414    ALKPHOS 58 08/20/2024 1045    AST 28 06/18/2025 1414    AST 26 08/20/2024 1045    ALT 28 06/18/2025 1414    ALT 28 08/20/2024 1045    BILITOT 0.7 06/18/2025 1414    BILITOT 0.8 08/20/2024 1045    ESTGFRAFRICA >60.0 04/18/2022 1432    EGFRNONAA 52.4 (A) 04/18/2022 1432          Lab Results   Component Value Date    TSH 1.832 06/18/2025     No results found for: "INR", "PROTIME"  @RESUFAST(WBC,HGB,HCT,MCV,PLT)  @LABRCNTIP(BNP,BNPTRIAGEBLO)@  CrCl cannot be calculated (Patient's most recent lab result is older than the maximum 7 days allowed.).     Results for orders placed during the hospital encounter of 06/30/25    Echo    Interpretation Summary    Left Ventricle: The left ventricle is normal in " size. Ventricular mass is normal. Mildly increased wall thickness. There is concentric remodeling. There is normal systolic function with a visually estimated ejection fraction of 55 - 60%. There is normal diastolic function.    Right Ventricle: The right ventricle is normal in size measuring 3.1 cm. Wall thickness is normal. Systolic function is normal.    Tricuspid Valve: There is mild regurgitation.    Pulmonary Artery: The estimated pulmonary artery systolic pressure is 28 mmHg.    IVC/SVC: Normal venous pressure at 3 mmHg.     Results for orders placed during the hospital encounter of 07/21/25    Exercise Stress - EKG    Interpretation Summary    The patient exercised for 5 minutes 44 seconds on a Vivek protocol, achieving a peak heart rate of 166 bpm, which is 95% of the age predicted maximum heart rate. Their exercise capacity was normal.    The ECG portion of the study is negative for ischemia.    The patient reported no chest pain during the stress test.    The exercise capacity was normal.    Stress ECG: There is hypertensive blood pressure response with stress.    Patient had no chest pain pre, during and post test. Slade Fernandez reviewed EKG before patient left.     Assessment:      1. Essential hypertension    2. Mixed hyperlipidemia    3. MOSHE (obstructive sleep apnea)        Plan:   Essential hypertension    Mixed hyperlipidemia    MOSHE (obstructive sleep apnea)      Statin, low-fat diet-HLP   Lisinopril, low-sodium diet, profile blood pressure-HTN  CPAP-MOSHE   Risk factor modifications   Daily exercise as tolerated    CT calcium score  RTC 3m    Ok to return to work with no restrictions and no further cardiac work up    Courtney Guillot, FNP-C [facility], Cardiology         [1]   Social History  Tobacco Use    Smoking status: Former     Types: Cigars     Passive exposure: Past    Smokeless tobacco: Never   Substance Use Topics    Alcohol use: Yes     Comment: 1 x monthly    Drug use: Never   [2]    Current Outpatient Medications on File Prior to Visit   Medication Sig Dispense Refill    acetaminophen (TYLENOL) 325 MG tablet Take 325 mg by mouth every 6 (six) hours as needed for Pain.      atorvastatin (LIPITOR) 40 MG tablet Take 1 tablet (40 mg total) by mouth once daily. 90 tablet 3    lisinopriL (PRINIVIL,ZESTRIL) 40 MG tablet Take 1 tablet (40 mg total) by mouth once daily. 90 tablet 3    multivitamin with minerals tablet Take 1 tablet by mouth.      pregabalin (LYRICA) 75 MG capsule Take 1 capsule (75 mg total) by mouth 2 (two) times daily. 60 capsule 5    tirzepatide 2.5 mg/0.1 mL Syrg Inject 2.5 mg into the skin once a week.       No current facility-administered medications on file prior to visit.

## 2025-08-08 ENCOUNTER — HOSPITAL ENCOUNTER (OUTPATIENT)
Dept: RADIOLOGY | Facility: HOSPITAL | Age: 45
Discharge: HOME | End: 2025-08-08
Payer: COMMERCIAL

## 2025-08-08 DIAGNOSIS — Z82.49 FAMILY HISTORY OF EARLY CAD: ICD-10-CM

## 2025-08-08 DIAGNOSIS — E78.2 MIXED HYPERLIPIDEMIA: ICD-10-CM

## 2025-08-08 DIAGNOSIS — I10 ESSENTIAL HYPERTENSION: ICD-10-CM

## 2025-08-08 DIAGNOSIS — G47.33 OSA (OBSTRUCTIVE SLEEP APNEA): ICD-10-CM

## 2025-08-08 PROCEDURE — 75571 CT HRT W/O DYE W/CA TEST: CPT | Mod: TC,PN

## 2025-08-08 PROCEDURE — 75571 CT HRT W/O DYE W/CA TEST: CPT | Mod: 26,,, | Performed by: RADIOLOGY

## 2025-08-12 ENCOUNTER — TELEPHONE (OUTPATIENT)
Dept: CARDIOLOGY | Facility: CLINIC | Age: 45
End: 2025-08-12
Payer: COMMERCIAL

## 2025-08-13 ENCOUNTER — OFFICE VISIT (OUTPATIENT)
Dept: SLEEP MEDICINE | Facility: CLINIC | Age: 45
End: 2025-08-13
Payer: COMMERCIAL

## 2025-08-13 VITALS
DIASTOLIC BLOOD PRESSURE: 85 MMHG | BODY MASS INDEX: 42.66 KG/M2 | HEART RATE: 77 BPM | WEIGHT: 315 LBS | SYSTOLIC BLOOD PRESSURE: 153 MMHG | HEIGHT: 72 IN

## 2025-08-13 DIAGNOSIS — E66.01 MORBID OBESITY WITH BMI OF 45.0-49.9, ADULT: ICD-10-CM

## 2025-08-13 DIAGNOSIS — G47.26 SHIFTING SLEEP-WORK SCHEDULE: ICD-10-CM

## 2025-08-13 DIAGNOSIS — G47.33 OSA (OBSTRUCTIVE SLEEP APNEA): Primary | ICD-10-CM

## 2025-08-13 DIAGNOSIS — F51.04 PSYCHOPHYSIOLOGICAL INSOMNIA: ICD-10-CM
